# Patient Record
Sex: FEMALE | Race: WHITE | NOT HISPANIC OR LATINO
[De-identification: names, ages, dates, MRNs, and addresses within clinical notes are randomized per-mention and may not be internally consistent; named-entity substitution may affect disease eponyms.]

---

## 2017-01-18 ENCOUNTER — APPOINTMENT (OUTPATIENT)
Age: 72
End: 2017-01-18

## 2017-03-01 ENCOUNTER — APPOINTMENT (OUTPATIENT)
Dept: INTERNAL MEDICINE | Facility: CLINIC | Age: 72
End: 2017-03-01

## 2017-03-01 VITALS
WEIGHT: 134 LBS | HEART RATE: 58 BPM | HEIGHT: 62 IN | DIASTOLIC BLOOD PRESSURE: 80 MMHG | BODY MASS INDEX: 24.66 KG/M2 | SYSTOLIC BLOOD PRESSURE: 140 MMHG | OXYGEN SATURATION: 98 %

## 2017-03-01 VITALS — TEMPERATURE: 98.6 F

## 2017-03-01 DIAGNOSIS — R74.8 ABNORMAL LEVELS OF OTHER SERUM ENZYMES: ICD-10-CM

## 2017-03-01 RX ORDER — PROMETHAZINE HYDROCHLORIDE AND DEXTROMETHORPHAN HYDROBROMIDE ORAL SOLUTION 15; 6.25 MG/5ML; MG/5ML
6.25-15 SOLUTION ORAL AT BEDTIME
Qty: 100 | Refills: 0 | Status: COMPLETED | COMMUNITY
Start: 2016-09-02 | End: 2017-03-21

## 2017-10-20 ENCOUNTER — APPOINTMENT (OUTPATIENT)
Dept: INTERNAL MEDICINE | Facility: CLINIC | Age: 72
End: 2017-10-20
Payer: MEDICARE

## 2017-10-20 VITALS
DIASTOLIC BLOOD PRESSURE: 80 MMHG | HEIGHT: 62 IN | HEART RATE: 60 BPM | WEIGHT: 132 LBS | SYSTOLIC BLOOD PRESSURE: 180 MMHG | BODY MASS INDEX: 24.29 KG/M2 | OXYGEN SATURATION: 92 %

## 2017-10-20 VITALS — DIASTOLIC BLOOD PRESSURE: 75 MMHG | SYSTOLIC BLOOD PRESSURE: 140 MMHG

## 2017-10-20 DIAGNOSIS — J06.9 ACUTE UPPER RESPIRATORY INFECTION, UNSPECIFIED: ICD-10-CM

## 2017-10-20 PROCEDURE — 99215 OFFICE O/P EST HI 40 MIN: CPT | Mod: 25

## 2017-10-20 PROCEDURE — 90670 PCV13 VACCINE IM: CPT

## 2017-10-20 PROCEDURE — G0009: CPT

## 2017-10-23 LAB
25(OH)D3 SERPL-MCNC: 16.9 NG/ML
ALBUMIN SERPL ELPH-MCNC: 4.1 G/DL
ALP BLD-CCNC: 62 U/L
ALT SERPL-CCNC: 12 U/L
ANION GAP SERPL CALC-SCNC: 14 MMOL/L
AST SERPL-CCNC: 15 U/L
BASOPHILS # BLD AUTO: 0.04 K/UL
BASOPHILS NFR BLD AUTO: 0.6 %
BILIRUB SERPL-MCNC: 0.3 MG/DL
BUN SERPL-MCNC: 13 MG/DL
CALCIUM SERPL-MCNC: 9.5 MG/DL
CHLORIDE SERPL-SCNC: 104 MMOL/L
CHOLEST SERPL-MCNC: 149 MG/DL
CHOLEST/HDLC SERPL: 2 RATIO
CO2 SERPL-SCNC: 23 MMOL/L
CREAT SERPL-MCNC: 0.84 MG/DL
EOSINOPHIL # BLD AUTO: 0.08 K/UL
EOSINOPHIL NFR BLD AUTO: 1.2 %
GLUCOSE SERPL-MCNC: 97 MG/DL
HBA1C MFR BLD HPLC: 5.9 %
HCT VFR BLD CALC: 43.4 %
HDLC SERPL-MCNC: 73 MG/DL
HGB BLD-MCNC: 14.5 G/DL
IMM GRANULOCYTES NFR BLD AUTO: 0.3 %
LDLC SERPL CALC-MCNC: 66 MG/DL
LYMPHOCYTES # BLD AUTO: 1.68 K/UL
LYMPHOCYTES NFR BLD AUTO: 26.1 %
MAN DIFF?: NORMAL
MCHC RBC-ENTMCNC: 32.1 PG
MCHC RBC-ENTMCNC: 33.4 GM/DL
MCV RBC AUTO: 96 FL
MONOCYTES # BLD AUTO: 0.41 K/UL
MONOCYTES NFR BLD AUTO: 6.4 %
NEUTROPHILS # BLD AUTO: 4.21 K/UL
NEUTROPHILS NFR BLD AUTO: 65.4 %
PLATELET # BLD AUTO: 313 K/UL
POTASSIUM SERPL-SCNC: 4.4 MMOL/L
PROT SERPL-MCNC: 6.9 G/DL
RBC # BLD: 4.52 M/UL
RBC # FLD: 13.5 %
SODIUM SERPL-SCNC: 141 MMOL/L
TRIGL SERPL-MCNC: 52 MG/DL
TSH SERPL-ACNC: 0.93 UIU/ML
WBC # FLD AUTO: 6.44 K/UL

## 2017-10-26 ENCOUNTER — FORM ENCOUNTER (OUTPATIENT)
Age: 72
End: 2017-10-26

## 2017-10-27 ENCOUNTER — APPOINTMENT (OUTPATIENT)
Dept: MAMMOGRAPHY | Facility: IMAGING CENTER | Age: 72
End: 2017-10-27
Payer: MEDICARE

## 2017-10-27 ENCOUNTER — OUTPATIENT (OUTPATIENT)
Dept: OUTPATIENT SERVICES | Facility: HOSPITAL | Age: 72
LOS: 1 days | End: 2017-10-27
Payer: MEDICARE

## 2017-10-27 ENCOUNTER — APPOINTMENT (OUTPATIENT)
Dept: ULTRASOUND IMAGING | Facility: IMAGING CENTER | Age: 72
End: 2017-10-27
Payer: MEDICARE

## 2017-10-27 DIAGNOSIS — Z00.8 ENCOUNTER FOR OTHER GENERAL EXAMINATION: ICD-10-CM

## 2017-10-27 PROCEDURE — G0204: CPT | Mod: 26

## 2017-10-27 PROCEDURE — 77066 DX MAMMO INCL CAD BI: CPT

## 2017-10-27 PROCEDURE — 76642 ULTRASOUND BREAST LIMITED: CPT | Mod: 26,RT

## 2017-10-27 PROCEDURE — G0279: CPT | Mod: 26

## 2017-10-27 PROCEDURE — G0279: CPT

## 2017-10-27 PROCEDURE — 76642 ULTRASOUND BREAST LIMITED: CPT

## 2017-11-10 ENCOUNTER — EMERGENCY (EMERGENCY)
Facility: HOSPITAL | Age: 72
LOS: 1 days | Discharge: HOME | End: 2017-11-10
Attending: EMERGENCY MEDICINE | Admitting: EMERGENCY MEDICINE
Payer: MEDICARE

## 2017-11-10 VITALS
TEMPERATURE: 98 F | RESPIRATION RATE: 16 BRPM | OXYGEN SATURATION: 96 % | SYSTOLIC BLOOD PRESSURE: 145 MMHG | HEART RATE: 56 BPM | DIASTOLIC BLOOD PRESSURE: 69 MMHG

## 2017-11-10 VITALS
HEART RATE: 58 BPM | SYSTOLIC BLOOD PRESSURE: 142 MMHG | RESPIRATION RATE: 16 BRPM | OXYGEN SATURATION: 97 % | DIASTOLIC BLOOD PRESSURE: 78 MMHG

## 2017-11-10 LAB
ALBUMIN SERPL ELPH-MCNC: 4.5 G/DL — SIGNIFICANT CHANGE UP (ref 3.3–5)
ALP SERPL-CCNC: 65 U/L — SIGNIFICANT CHANGE UP (ref 40–120)
ALT FLD-CCNC: 18 U/L RC — SIGNIFICANT CHANGE UP (ref 10–45)
ANION GAP SERPL CALC-SCNC: 13 MMOL/L — SIGNIFICANT CHANGE UP (ref 5–17)
AST SERPL-CCNC: 20 U/L — SIGNIFICANT CHANGE UP (ref 10–40)
BASOPHILS # BLD AUTO: 0.1 K/UL — SIGNIFICANT CHANGE UP (ref 0–0.2)
BASOPHILS NFR BLD AUTO: 0.7 % — SIGNIFICANT CHANGE UP (ref 0–2)
BILIRUB SERPL-MCNC: 0.3 MG/DL — SIGNIFICANT CHANGE UP (ref 0.2–1.2)
BUN SERPL-MCNC: 12 MG/DL — SIGNIFICANT CHANGE UP (ref 7–23)
CALCIUM SERPL-MCNC: 9.3 MG/DL — SIGNIFICANT CHANGE UP (ref 8.4–10.5)
CHLORIDE SERPL-SCNC: 102 MMOL/L — SIGNIFICANT CHANGE UP (ref 96–108)
CO2 SERPL-SCNC: 22 MMOL/L — SIGNIFICANT CHANGE UP (ref 22–31)
CREAT SERPL-MCNC: 0.63 MG/DL — SIGNIFICANT CHANGE UP (ref 0.5–1.3)
EOSINOPHIL # BLD AUTO: 0.1 K/UL — SIGNIFICANT CHANGE UP (ref 0–0.5)
EOSINOPHIL NFR BLD AUTO: 0.9 % — SIGNIFICANT CHANGE UP (ref 0–6)
GLUCOSE SERPL-MCNC: 105 MG/DL — HIGH (ref 70–99)
HCT VFR BLD CALC: 44.6 % — SIGNIFICANT CHANGE UP (ref 34.5–45)
HGB BLD-MCNC: 14.6 G/DL — SIGNIFICANT CHANGE UP (ref 11.5–15.5)
LYMPHOCYTES # BLD AUTO: 1.4 K/UL — SIGNIFICANT CHANGE UP (ref 1–3.3)
LYMPHOCYTES # BLD AUTO: 17.2 % — SIGNIFICANT CHANGE UP (ref 13–44)
MCHC RBC-ENTMCNC: 32 PG — SIGNIFICANT CHANGE UP (ref 27–34)
MCHC RBC-ENTMCNC: 32.7 GM/DL — SIGNIFICANT CHANGE UP (ref 32–36)
MCV RBC AUTO: 98 FL — SIGNIFICANT CHANGE UP (ref 80–100)
MONOCYTES # BLD AUTO: 0.5 K/UL — SIGNIFICANT CHANGE UP (ref 0–0.9)
MONOCYTES NFR BLD AUTO: 6.4 % — SIGNIFICANT CHANGE UP (ref 2–14)
NEUTROPHILS # BLD AUTO: 5.9 K/UL — SIGNIFICANT CHANGE UP (ref 1.8–7.4)
NEUTROPHILS NFR BLD AUTO: 74.8 % — SIGNIFICANT CHANGE UP (ref 43–77)
PLATELET # BLD AUTO: 288 K/UL — SIGNIFICANT CHANGE UP (ref 150–400)
POTASSIUM SERPL-MCNC: 3.7 MMOL/L — SIGNIFICANT CHANGE UP (ref 3.5–5.3)
POTASSIUM SERPL-SCNC: 3.7 MMOL/L — SIGNIFICANT CHANGE UP (ref 3.5–5.3)
PROT SERPL-MCNC: 6.8 G/DL — SIGNIFICANT CHANGE UP (ref 6–8.3)
RBC # BLD: 4.55 M/UL — SIGNIFICANT CHANGE UP (ref 3.8–5.2)
RBC # FLD: 11.3 % — SIGNIFICANT CHANGE UP (ref 10.3–14.5)
SODIUM SERPL-SCNC: 137 MMOL/L — SIGNIFICANT CHANGE UP (ref 135–145)
WBC # BLD: 7.9 K/UL — SIGNIFICANT CHANGE UP (ref 3.8–10.5)
WBC # FLD AUTO: 7.9 K/UL — SIGNIFICANT CHANGE UP (ref 3.8–10.5)

## 2017-11-10 PROCEDURE — 99284 EMERGENCY DEPT VISIT MOD MDM: CPT | Mod: 25

## 2017-11-10 PROCEDURE — 80053 COMPREHEN METABOLIC PANEL: CPT

## 2017-11-10 PROCEDURE — 85027 COMPLETE CBC AUTOMATED: CPT

## 2017-11-10 PROCEDURE — 96374 THER/PROPH/DIAG INJ IV PUSH: CPT

## 2017-11-10 PROCEDURE — 99284 EMERGENCY DEPT VISIT MOD MDM: CPT

## 2017-11-10 PROCEDURE — 82962 GLUCOSE BLOOD TEST: CPT

## 2017-11-10 RX ORDER — MECLIZINE HCL 12.5 MG
1 TABLET ORAL
Qty: 15 | Refills: 0
Start: 2017-11-10 | End: 2017-11-15

## 2017-11-10 RX ORDER — SODIUM CHLORIDE 9 MG/ML
1000 INJECTION INTRAMUSCULAR; INTRAVENOUS; SUBCUTANEOUS ONCE
Qty: 0 | Refills: 0 | Status: COMPLETED | OUTPATIENT
Start: 2017-11-10 | End: 2017-11-10

## 2017-11-10 RX ORDER — MECLIZINE HCL 12.5 MG
25 TABLET ORAL ONCE
Qty: 0 | Refills: 0 | Status: COMPLETED | OUTPATIENT
Start: 2017-11-10 | End: 2017-11-10

## 2017-11-10 RX ORDER — ONDANSETRON 8 MG/1
4 TABLET, FILM COATED ORAL ONCE
Qty: 0 | Refills: 0 | Status: COMPLETED | OUTPATIENT
Start: 2017-11-10 | End: 2017-11-10

## 2017-11-10 RX ADMIN — ONDANSETRON 4 MILLIGRAM(S): 8 TABLET, FILM COATED ORAL at 15:34

## 2017-11-10 RX ADMIN — SODIUM CHLORIDE 2000 MILLILITER(S): 9 INJECTION INTRAMUSCULAR; INTRAVENOUS; SUBCUTANEOUS at 15:34

## 2017-11-10 RX ADMIN — Medication 25 MILLIGRAM(S): at 14:19

## 2017-11-10 NOTE — ED PROVIDER NOTE - PROGRESS NOTE DETAILS
symptoms completely resolved. will d/c on vertigo -[facility] DO Dr. Mccall Note: s/o from Dr. Fernandez, stable for [place] home with resolved sxs.

## 2017-11-10 NOTE — ED ADULT NURSE NOTE - OBJECTIVE STATEMENT
72y f pt arrived via ems; emt reports pt suddenly started feeling dizzy and nauseous; pt vomited; pt aox3; no resp distress; + perrl; no vision changes; no chest pain; no abd pain; +N/V; no diarrhea; pt states symptoms relieved when sitting and not moving; exacerbated by moving and bending; pt states has no medical hx; and is taking no medications except vitamin d; pt ambulates on own without assist; skin warm dry intact; no visible neuro deficits

## 2017-11-10 NOTE — ED PROVIDER NOTE - NS ED ROS FT
ROS: no CP/SOB. no cough. no fever. +n/v no d/c. no abd pain. no rash. no bleeding. no urinary complaints. no weakness. no vision changes. no HA. no neck/back pain. no extremity swelling/deformity. No change in mental status.

## 2017-11-10 NOTE — ED PROVIDER NOTE - OBJECTIVE STATEMENT
71yo F with dizziness/vertigo onset this afternoon, +nausesa with episode of vomiting. +rt ear fullness, no tinnitius or loss of hearing. no head trauma. no focal weakness. feels unsteady on feet. no PMH.

## 2017-11-10 NOTE — ED PROVIDER NOTE - ATTENDING CONTRIBUTION TO CARE
Patient presenting c/o dizziness associated with nausea and vomiting.  Patient reporting that she noted something feeling off with her R ear, then started feeling lightheaded with nausea which worsened over a few minutes.  Denying associated headaches, numbness or tingling.  Dizziness resolves with lying backwards, returns with movement.    On exam patient well appearing, vital signs within normal limits, RRR S1/S2, lungs clear to ascultation bilaterally, abdomen soft, non tender, non distended.  R beating nystagmus on exam, no noted truncal ataxia.    Patient history and exam most consistent with peripheral process given non thunderclap origin of symptoms, normal general neurologic exam, lack of truncal ataxia, peripheral nystagmus pattern and resolution of symptoms with positioning, plan for treatment for peripheral vertigo and re-evaluation.

## 2017-11-10 NOTE — ED PROVIDER NOTE - MEDICAL DECISION MAKING DETAILS
peripheral vertigo, HINTS exam peripheral, no concern for central lesion, will trial meclizine and reasses.

## 2017-11-10 NOTE — ED PROVIDER NOTE - CARE PLAN
Principal Discharge DX:	Peripheral vertigo involving right ear Principal Discharge DX:	Peripheral vertigo involving right ear  Instructions for follow-up, activity and diet:	1. Return to ED for worsening, progressive or any other concerning symptoms   2. Follow up with your primary care doctor in 2-3days  3. Take 25mg of meclizine three times a day for 3-5 days.   4. Follow up with ENT clinic [phone]

## 2017-11-10 NOTE — ED PROVIDER NOTE - PLAN OF CARE
1. Return to ED for worsening, progressive or any other concerning symptoms   2. Follow up with your primary care doctor in 2-3days  3. Take 25mg of meclizine three times a day for 3-5 days.   4. Follow up with ENT clinic [phone]

## 2017-11-10 NOTE — ED PROVIDER NOTE - PHYSICAL EXAMINATION
Gen: NAD, AOx3  Head: NCAT  HEENT: PERRL, oral mucosa moist, normal conjunctiva, neck supple  Lung: CTAB, no respiratory distress  CV: rrr, no murmur, Normal perfusion  Abd: soft, NTND, no CVA tenderness  MSK: No edema, no visible deformities  Neuro: No focal neurologic deficits, CN II-XII intact, +rt nystagmus, HINTS with +saccade, no dysmetria, 5/5 global strength, sensation intact, no drift  Skin: No rash   Psych: normal affect

## 2017-11-13 ENCOUNTER — APPOINTMENT (OUTPATIENT)
Dept: INTERNAL MEDICINE | Facility: CLINIC | Age: 72
End: 2017-11-13
Payer: MEDICARE

## 2017-11-13 VITALS
HEIGHT: 62 IN | WEIGHT: 135 LBS | SYSTOLIC BLOOD PRESSURE: 130 MMHG | DIASTOLIC BLOOD PRESSURE: 80 MMHG | BODY MASS INDEX: 24.84 KG/M2 | HEART RATE: 70 BPM | OXYGEN SATURATION: 98 %

## 2017-11-13 PROCEDURE — 99213 OFFICE O/P EST LOW 20 MIN: CPT

## 2017-11-15 ENCOUNTER — APPOINTMENT (OUTPATIENT)
Dept: OTOLARYNGOLOGY | Facility: CLINIC | Age: 72
End: 2017-11-15
Payer: MEDICARE

## 2017-11-15 VITALS
BODY MASS INDEX: 24.29 KG/M2 | DIASTOLIC BLOOD PRESSURE: 78 MMHG | SYSTOLIC BLOOD PRESSURE: 151 MMHG | HEIGHT: 62 IN | HEART RATE: 69 BPM | WEIGHT: 132 LBS

## 2017-11-15 DIAGNOSIS — Z87.898 PERSONAL HISTORY OF OTHER SPECIFIED CONDITIONS: ICD-10-CM

## 2017-11-15 PROCEDURE — 92567 TYMPANOMETRY: CPT

## 2017-11-15 PROCEDURE — 92557 COMPREHENSIVE HEARING TEST: CPT

## 2017-11-15 PROCEDURE — 69210 REMOVE IMPACTED EAR WAX UNI: CPT

## 2017-11-15 PROCEDURE — 99204 OFFICE O/P NEW MOD 45 MIN: CPT | Mod: 25

## 2017-11-17 ENCOUNTER — MEDICATION RENEWAL (OUTPATIENT)
Age: 72
End: 2017-11-17

## 2018-02-12 ENCOUNTER — APPOINTMENT (OUTPATIENT)
Dept: GASTROENTEROLOGY | Facility: CLINIC | Age: 73
End: 2018-02-12
Payer: MEDICARE

## 2018-02-12 VITALS
SYSTOLIC BLOOD PRESSURE: 132 MMHG | RESPIRATION RATE: 16 BRPM | BODY MASS INDEX: 24.29 KG/M2 | TEMPERATURE: 97.9 F | WEIGHT: 132 LBS | DIASTOLIC BLOOD PRESSURE: 80 MMHG | HEIGHT: 62 IN | HEART RATE: 65 BPM

## 2018-02-12 DIAGNOSIS — Z80.0 FAMILY HISTORY OF MALIGNANT NEOPLASM OF DIGESTIVE ORGANS: ICD-10-CM

## 2018-02-12 PROCEDURE — 99204 OFFICE O/P NEW MOD 45 MIN: CPT

## 2018-02-21 ENCOUNTER — APPOINTMENT (OUTPATIENT)
Dept: GASTROENTEROLOGY | Facility: CLINIC | Age: 73
End: 2018-02-21

## 2018-03-05 ENCOUNTER — RX RENEWAL (OUTPATIENT)
Age: 73
End: 2018-03-05

## 2018-03-30 ENCOUNTER — APPOINTMENT (OUTPATIENT)
Dept: GASTROENTEROLOGY | Facility: AMBULATORY MEDICAL SERVICES | Age: 73
End: 2018-03-30
Payer: MEDICARE

## 2018-03-30 PROCEDURE — 45385 COLONOSCOPY W/LESION REMOVAL: CPT | Mod: PT

## 2018-03-30 PROCEDURE — 45380 COLONOSCOPY AND BIOPSY: CPT | Mod: 59,PT

## 2018-04-03 ENCOUNTER — RESULT REVIEW (OUTPATIENT)
Age: 73
End: 2018-04-03

## 2018-05-06 ENCOUNTER — FORM ENCOUNTER (OUTPATIENT)
Age: 73
End: 2018-05-06

## 2018-05-07 ENCOUNTER — OUTPATIENT (OUTPATIENT)
Dept: OUTPATIENT SERVICES | Facility: HOSPITAL | Age: 73
LOS: 1 days | End: 2018-05-07
Payer: MEDICARE

## 2018-05-07 ENCOUNTER — APPOINTMENT (OUTPATIENT)
Dept: MAMMOGRAPHY | Facility: IMAGING CENTER | Age: 73
End: 2018-05-07
Payer: MEDICARE

## 2018-05-07 DIAGNOSIS — R92.1 MAMMOGRAPHIC CALCIFICATION FOUND ON DIAGNOSTIC IMAGING OF BREAST: ICD-10-CM

## 2018-05-07 PROCEDURE — 77065 DX MAMMO INCL CAD UNI: CPT | Mod: 26,LT

## 2018-05-07 PROCEDURE — 77065 DX MAMMO INCL CAD UNI: CPT

## 2018-05-21 ENCOUNTER — APPOINTMENT (OUTPATIENT)
Dept: INTERNAL MEDICINE | Facility: CLINIC | Age: 73
End: 2018-05-21
Payer: MEDICARE

## 2018-05-21 VITALS
DIASTOLIC BLOOD PRESSURE: 76 MMHG | WEIGHT: 133 LBS | OXYGEN SATURATION: 95 % | HEIGHT: 62 IN | SYSTOLIC BLOOD PRESSURE: 124 MMHG | HEART RATE: 67 BPM | BODY MASS INDEX: 24.48 KG/M2

## 2018-05-21 DIAGNOSIS — H83.01 LABYRINTHITIS, RIGHT EAR: ICD-10-CM

## 2018-05-21 DIAGNOSIS — Z87.898 PERSONAL HISTORY OF OTHER SPECIFIED CONDITIONS: ICD-10-CM

## 2018-05-21 DIAGNOSIS — H90.3 SENSORINEURAL HEARING LOSS, BILATERAL: ICD-10-CM

## 2018-05-21 DIAGNOSIS — R19.4 CHANGE IN BOWEL HABIT: ICD-10-CM

## 2018-05-21 DIAGNOSIS — R79.89 OTHER SPECIFIED ABNORMAL FINDINGS OF BLOOD CHEMISTRY: ICD-10-CM

## 2018-05-21 PROCEDURE — 99213 OFFICE O/P EST LOW 20 MIN: CPT

## 2018-05-21 RX ORDER — MECLIZINE HYDROCHLORIDE 25 MG/1
25 TABLET ORAL
Qty: 15 | Refills: 0 | Status: COMPLETED | COMMUNITY
Start: 2017-11-10 | End: 2018-05-21

## 2018-05-21 RX ORDER — FLUTICASONE PROPIONATE 50 UG/1
50 SPRAY, METERED NASAL
Qty: 16 | Refills: 0 | Status: COMPLETED | COMMUNITY
Start: 2017-02-23 | End: 2018-05-21

## 2018-05-21 RX ORDER — IPRATROPIUM BROMIDE 42 UG/1
0.06 SPRAY NASAL
Qty: 1 | Refills: 0 | Status: COMPLETED | COMMUNITY
Start: 2018-05-21 | End: 2018-05-28

## 2018-05-21 RX ORDER — AZELASTINE HYDROCHLORIDE 137 UG/1
0.1 SPRAY, METERED NASAL
Qty: 30 | Refills: 0 | Status: COMPLETED | COMMUNITY
Start: 2017-02-23 | End: 2018-05-21

## 2018-05-21 NOTE — PHYSICAL EXAM
[No Acute Distress] : no acute distress [Well Nourished] : well nourished [Well Developed] : well developed [Normal Sclera/Conjunctiva] : normal sclera/conjunctiva [PERRL] : pupils equal round and reactive to light [EOMI] : extraocular movements intact [Normal Outer Ear/Nose] : the outer ears and nose were normal in appearance [Normal Oropharynx] : the oropharynx was normal [Supple] : supple [No Lymphadenopathy] : no lymphadenopathy [No Respiratory Distress] : no respiratory distress  [Clear to Auscultation] : lungs were clear to auscultation bilaterally [No Accessory Muscle Use] : no accessory muscle use [Normal Rate] : normal rate  [Regular Rhythm] : with a regular rhythm [Normal S1, S2] : normal S1 and S2 [No Murmur] : no murmur heard [Normal Posterior Cervical Nodes] : no posterior cervical lymphadenopathy [Normal Anterior Cervical Nodes] : no anterior cervical lymphadenopathy [No Joint Swelling] : no joint swelling [Grossly Normal Strength/Tone] : grossly normal strength/tone [Speech Grossly Normal] : speech grossly normal [Normal Mood] : the mood was normal

## 2018-05-21 NOTE — HISTORY OF PRESENT ILLNESS
[FreeTextEntry8] : Presents with sinus congestion, dripping down to back of throat for 2 weeks. No fevers or chills. She is coughing with production as well. No swollen glands in neck that she noticed. Not taken anything yet OTC. No travel.

## 2018-05-21 NOTE — ASSESSMENT
[FreeTextEntry1] : \par Suspect allergic sinusitis given history and exam findings. Rec Ipratropium nasal spray and Fexofenadine 180mg daily for 7 days. Also may take Hycodan cough syrup as needed. I advised her about side effects (drowsiness, etc).

## 2018-08-02 ENCOUNTER — MEDICATION RENEWAL (OUTPATIENT)
Age: 73
End: 2018-08-02

## 2018-10-22 ENCOUNTER — APPOINTMENT (OUTPATIENT)
Dept: INTERNAL MEDICINE | Facility: CLINIC | Age: 73
End: 2018-10-22
Payer: MEDICARE

## 2018-10-22 VITALS
WEIGHT: 132 LBS | DIASTOLIC BLOOD PRESSURE: 84 MMHG | OXYGEN SATURATION: 98 % | TEMPERATURE: 98 F | SYSTOLIC BLOOD PRESSURE: 130 MMHG | HEART RATE: 72 BPM | BODY MASS INDEX: 24.29 KG/M2 | HEIGHT: 62 IN

## 2018-10-22 PROCEDURE — G0009: CPT

## 2018-10-22 PROCEDURE — 90662 IIV NO PRSV INCREASED AG IM: CPT

## 2018-10-22 PROCEDURE — G0439: CPT

## 2018-10-22 PROCEDURE — G0008: CPT

## 2018-10-22 PROCEDURE — 90732 PPSV23 VACC 2 YRS+ SUBQ/IM: CPT

## 2018-10-22 RX ORDER — HYDROCODONE BITARTRATE AND HOMATROPINE METHYLBROMIDE 5; 1.5 MG/5ML; MG/5ML
5-1.5 SYRUP ORAL
Qty: 240 | Refills: 0 | Status: DISCONTINUED | COMMUNITY
Start: 2018-08-02 | End: 2018-10-22

## 2018-10-22 NOTE — PHYSICAL EXAM
[General Appearance - Alert] : alert [General Appearance - In No Acute Distress] : in no acute distress [Sclera] : the sclera and conjunctiva were normal [PERRL With Normal Accommodation] : pupils were equal in size, round, and reactive to light [Extraocular Movements] : extraocular movements were intact [Outer Ear] : the ears and nose were normal in appearance [Oropharynx] : the oropharynx was normal [Neck Appearance] : the appearance of the neck was normal [Neck Cervical Mass (___cm)] : no neck mass was observed [Jugular Venous Distention Increased] : there was no jugular-venous distention [Thyroid Diffuse Enlargement] : the thyroid was not enlarged [Thyroid Nodule] : there were no palpable thyroid nodules [Auscultation Breath Sounds / Voice Sounds] : lungs were clear to auscultation bilaterally [Heart Rate And Rhythm] : heart rate was normal and rhythm regular [Heart Sounds] : normal S1 and S2 [Heart Sounds Gallop] : no gallops [Murmurs] : no murmurs [Heart Sounds Pericardial Friction Rub] : no pericardial rub [Full Pulse] : the pedal pulses are present [Edema] : there was no peripheral edema [Breast Appearance] : normal in appearance [Breast Palpation Mass] : no palpable masses [Breast Abnormal Lactation (Galactorrhea)] : no nipple discharge [Bowel Sounds] : normal bowel sounds [Abdomen Soft] : soft [Abdomen Tenderness] : non-tender [Abdomen Mass (___ Cm)] : no abdominal mass palpated [Cervical Lymph Nodes Enlarged Posterior Bilaterally] : posterior cervical [Cervical Lymph Nodes Enlarged Anterior Bilaterally] : anterior cervical [Supraclavicular Lymph Nodes Enlarged Bilaterally] : supraclavicular [Axillary Lymph Nodes Enlarged Bilaterally] : axillary [Femoral Lymph Nodes Enlarged Bilaterally] : femoral [Inguinal Lymph Nodes Enlarged Bilaterally] : inguinal [No CVA Tenderness] : no ~M costovertebral angle tenderness [No Spinal Tenderness] : no spinal tenderness [Abnormal Walk] : normal gait [Nail Clubbing] : no clubbing  or cyanosis of the fingernails [Musculoskeletal - Swelling] : no joint swelling seen [Motor Tone] : muscle strength and tone were normal [] : no rash [Sensation] : the sensory exam was normal to light touch and pinprick [Motor Exam] : the motor exam was normal [No Focal Deficits] : no focal deficits [Oriented To Time, Place, And Person] : oriented to person, place, and time [Impaired Insight] : insight and judgment were intact [Affect] : the affect was normal

## 2018-10-22 NOTE — HISTORY OF PRESENT ILLNESS
[Health Maintenance] : health maintenance [___ Year(s) Ago] : [unfilled] year(s) ago [Spouse] : spouse [] :  [Working Full Time] : working full time [Current Cigarette Smoker] : is a current cigarette smoker [Occasional Use] : occasional alcohol use [None] : The patient has no concerns about alcohol abuse [Never] : has never used illicit drugs [Good] : good [Reg. Dental Visits] : She has regular dental visits [Healthy Diet] : She consumes a diverse and healthy diet [Regular Exercise] : She exercises regularly [Tobacco Use] : She uses tobacco [Alcohol Use] : She consumes alcohol [Postmenopausal] : the patient is postmenopausal [Reviewed and Updated] : risk screening reviewed and updated [Vision Problems] : She denies vision problems [Hearing Loss] : She denies hearing loss [Drug Abuse] : She denies drug use

## 2018-10-22 NOTE — REASON FOR VISIT
[CPE] : a comprehensive physical exam [Pre-Visit Preparation] : pre-visit preparation was done [Intercurrent Specialty/Sub-specialty Visits] : the patient has intercurrent specialty/sub-specialty visits [FreeTextEntry3] : GI

## 2018-10-22 NOTE — ASSESSMENT
[FreeTextEntry1] : \par 73 year old woman current smoker with hx elevated LFTs presents for CPE.\par \par 1. Elevated LFTs: appreciate GI input; repeat LFTs with labs today\par \par 2. HCM: mammo referral given; check labs with physical today; counselled pt on smoking cessation, she is not interested in quitting or cutting back at this time; GI referral given for screening colonoscopy; flu shot and Pneumovax given today

## 2018-10-25 ENCOUNTER — FORM ENCOUNTER (OUTPATIENT)
Age: 73
End: 2018-10-25

## 2018-10-26 ENCOUNTER — OUTPATIENT (OUTPATIENT)
Dept: OUTPATIENT SERVICES | Facility: HOSPITAL | Age: 73
LOS: 1 days | End: 2018-10-26
Payer: MEDICARE

## 2018-10-26 ENCOUNTER — APPOINTMENT (OUTPATIENT)
Dept: MAMMOGRAPHY | Facility: IMAGING CENTER | Age: 73
End: 2018-10-26
Payer: MEDICARE

## 2018-10-26 ENCOUNTER — APPOINTMENT (OUTPATIENT)
Dept: ULTRASOUND IMAGING | Facility: IMAGING CENTER | Age: 73
End: 2018-10-26
Payer: MEDICARE

## 2018-10-26 DIAGNOSIS — R92.1 MAMMOGRAPHIC CALCIFICATION FOUND ON DIAGNOSTIC IMAGING OF BREAST: ICD-10-CM

## 2018-10-26 LAB
ALBUMIN SERPL ELPH-MCNC: 4.3 G/DL
ALP BLD-CCNC: 59 U/L
ALT SERPL-CCNC: 13 U/L
ANION GAP SERPL CALC-SCNC: 13 MMOL/L
APTT BLD: 32.5 SEC
AST SERPL-CCNC: 20 U/L
BASOPHILS # BLD AUTO: 0.06 K/UL
BASOPHILS NFR BLD AUTO: 0.8 %
BILIRUB SERPL-MCNC: 0.3 MG/DL
BUN SERPL-MCNC: 13 MG/DL
CALCIUM SERPL-MCNC: 9.6 MG/DL
CHLORIDE SERPL-SCNC: 101 MMOL/L
CHOLEST SERPL-MCNC: 164 MG/DL
CHOLEST/HDLC SERPL: 2.1 RATIO
CO2 SERPL-SCNC: 24 MMOL/L
CREAT SERPL-MCNC: 0.69 MG/DL
EOSINOPHIL # BLD AUTO: 0.06 K/UL
EOSINOPHIL NFR BLD AUTO: 0.8 %
GLUCOSE SERPL-MCNC: 102 MG/DL
HBA1C MFR BLD HPLC: 6 %
HCT VFR BLD CALC: 44.5 %
HDLC SERPL-MCNC: 79 MG/DL
HGB BLD-MCNC: 14.9 G/DL
IMM GRANULOCYTES NFR BLD AUTO: 0.3 %
INR PPP: 0.99 RATIO
LDLC SERPL CALC-MCNC: 72 MG/DL
LYMPHOCYTES # BLD AUTO: 1.77 K/UL
LYMPHOCYTES NFR BLD AUTO: 24 %
MAN DIFF?: NORMAL
MCHC RBC-ENTMCNC: 32.3 PG
MCHC RBC-ENTMCNC: 33.5 GM/DL
MCV RBC AUTO: 96.5 FL
MONOCYTES # BLD AUTO: 0.6 K/UL
MONOCYTES NFR BLD AUTO: 8.2 %
NEUTROPHILS # BLD AUTO: 4.85 K/UL
NEUTROPHILS NFR BLD AUTO: 65.9 %
PLATELET # BLD AUTO: 345 K/UL
POTASSIUM SERPL-SCNC: 4.4 MMOL/L
PROT SERPL-MCNC: 7.1 G/DL
PT BLD: 11.2 SEC
RBC # BLD: 4.61 M/UL
RBC # FLD: 13.8 %
SODIUM SERPL-SCNC: 138 MMOL/L
TRIGL SERPL-MCNC: 65 MG/DL
TSH SERPL-ACNC: 0.86 UIU/ML
WBC # FLD AUTO: 7.36 K/UL

## 2018-10-26 PROCEDURE — G0279: CPT

## 2018-10-26 PROCEDURE — 76641 ULTRASOUND BREAST COMPLETE: CPT | Mod: 26,50

## 2018-10-26 PROCEDURE — 77066 DX MAMMO INCL CAD BI: CPT

## 2018-10-26 PROCEDURE — 76641 ULTRASOUND BREAST COMPLETE: CPT

## 2018-10-26 PROCEDURE — G0279: CPT | Mod: 26

## 2018-10-26 PROCEDURE — 77066 DX MAMMO INCL CAD BI: CPT | Mod: 26

## 2018-11-26 ENCOUNTER — TRANSCRIPTION ENCOUNTER (OUTPATIENT)
Age: 73
End: 2018-11-26

## 2018-11-30 ENCOUNTER — TRANSCRIPTION ENCOUNTER (OUTPATIENT)
Age: 73
End: 2018-11-30

## 2018-11-30 ENCOUNTER — MEDICATION RENEWAL (OUTPATIENT)
Age: 73
End: 2018-11-30

## 2019-01-28 ENCOUNTER — TRANSCRIPTION ENCOUNTER (OUTPATIENT)
Age: 74
End: 2019-01-28

## 2019-05-28 ENCOUNTER — TRANSCRIPTION ENCOUNTER (OUTPATIENT)
Age: 74
End: 2019-05-28

## 2019-06-24 ENCOUNTER — RX RENEWAL (OUTPATIENT)
Age: 74
End: 2019-06-24

## 2019-07-16 ENCOUNTER — APPOINTMENT (OUTPATIENT)
Dept: GASTROENTEROLOGY | Facility: AMBULATORY MEDICAL SERVICES | Age: 74
End: 2019-07-16
Payer: MEDICARE

## 2019-07-16 PROCEDURE — 45385 COLONOSCOPY W/LESION REMOVAL: CPT

## 2019-07-23 ENCOUNTER — MESSAGE (OUTPATIENT)
Age: 74
End: 2019-07-23

## 2019-11-18 ENCOUNTER — APPOINTMENT (OUTPATIENT)
Dept: INTERNAL MEDICINE | Facility: CLINIC | Age: 74
End: 2019-11-18
Payer: MEDICARE

## 2019-11-18 VITALS
SYSTOLIC BLOOD PRESSURE: 178 MMHG | DIASTOLIC BLOOD PRESSURE: 99 MMHG | TEMPERATURE: 98 F | BODY MASS INDEX: 23.19 KG/M2 | HEIGHT: 62 IN | WEIGHT: 126 LBS

## 2019-11-18 VITALS — OXYGEN SATURATION: 97 % | HEART RATE: 61 BPM

## 2019-11-18 DIAGNOSIS — F17.200 NICOTINE DEPENDENCE, UNSPECIFIED, UNCOMPLICATED: ICD-10-CM

## 2019-11-18 DIAGNOSIS — J30.9 ALLERGIC RHINITIS, UNSPECIFIED: ICD-10-CM

## 2019-11-18 PROCEDURE — G0444 DEPRESSION SCREEN ANNUAL: CPT | Mod: 59

## 2019-11-18 PROCEDURE — G0442 ANNUAL ALCOHOL SCREEN 15 MIN: CPT | Mod: 59

## 2019-11-18 PROCEDURE — 99407 BEHAV CHNG SMOKING > 10 MIN: CPT | Mod: 25

## 2019-11-18 PROCEDURE — G0439: CPT

## 2019-11-19 PROBLEM — J30.9 ALLERGIC SINUSITIS: Status: RESOLVED | Noted: 2018-05-21 | Resolved: 2019-11-19

## 2019-11-19 RX ORDER — HYDROCODONE BITARTRATE AND HOMATROPINE METHYLBROMIDE 5; 1.5 MG/5ML; MG/5ML
5-1.5 SYRUP ORAL
Qty: 240 | Refills: 0 | Status: DISCONTINUED | COMMUNITY
Start: 2018-05-21 | End: 2019-11-19

## 2019-11-19 RX ORDER — FEXOFENADINE HYDROCHLORIDE 180 MG/1
180 TABLET ORAL DAILY
Qty: 7 | Refills: 0 | Status: DISCONTINUED | COMMUNITY
Start: 2018-05-21 | End: 2019-11-19

## 2019-11-19 RX ORDER — IPRATROPIUM BROMIDE 42 UG/1
0.06 SPRAY NASAL
Qty: 1 | Refills: 3 | Status: DISCONTINUED | COMMUNITY
Start: 2018-08-02 | End: 2019-11-19

## 2019-11-19 NOTE — HISTORY OF PRESENT ILLNESS
[Health Maintenance] : health maintenance [___ Year(s) Ago] : [unfilled] year(s) ago [Spouse] : spouse [] :  [Working Full Time] : working full time [Occasional Use] : occasional alcohol use [None] : The patient has no concerns about alcohol abuse [Current Cigarette Smoker] : is a current cigarette smoker [Never] : has never used illicit drugs [Good] : good [Reg. Dental Visits] : She has regular dental visits [Vision Problems] : She denies vision problems [Hearing Loss] : She denies hearing loss [Healthy Diet] : She consumes a diverse and healthy diet [Tobacco Use] : She uses tobacco [Regular Exercise] : She exercises regularly [Alcohol Use] : She consumes alcohol [Drug Abuse] : She denies drug use [Postmenopausal] : the patient is postmenopausal [Reviewed and Updated] : risk screening reviewed and updated

## 2019-11-19 NOTE — PHYSICAL EXAM
[Sclera] : the sclera and conjunctiva were normal [General Appearance - In No Acute Distress] : in no acute distress [General Appearance - Alert] : alert [PERRL With Normal Accommodation] : pupils were equal in size, round, and reactive to light [Extraocular Movements] : extraocular movements were intact [Oropharynx] : the oropharynx was normal [Outer Ear] : the ears and nose were normal in appearance [Neck Cervical Mass (___cm)] : no neck mass was observed [Neck Appearance] : the appearance of the neck was normal [Jugular Venous Distention Increased] : there was no jugular-venous distention [Thyroid Nodule] : there were no palpable thyroid nodules [Thyroid Diffuse Enlargement] : the thyroid was not enlarged [Heart Rate And Rhythm] : heart rate was normal and rhythm regular [Heart Sounds] : normal S1 and S2 [Auscultation Breath Sounds / Voice Sounds] : lungs were clear to auscultation bilaterally [Heart Sounds Gallop] : no gallops [Murmurs] : no murmurs [Full Pulse] : the pedal pulses are present [Heart Sounds Pericardial Friction Rub] : no pericardial rub [Edema] : there was no peripheral edema [Breast Appearance] : normal in appearance [Breast Palpation Mass] : no palpable masses [Breast Abnormal Lactation (Galactorrhea)] : no nipple discharge [Bowel Sounds] : normal bowel sounds [Abdomen Soft] : soft [Abdomen Mass (___ Cm)] : no abdominal mass palpated [Abdomen Tenderness] : non-tender [Cervical Lymph Nodes Enlarged Posterior Bilaterally] : posterior cervical [Cervical Lymph Nodes Enlarged Anterior Bilaterally] : anterior cervical [Supraclavicular Lymph Nodes Enlarged Bilaterally] : supraclavicular [Axillary Lymph Nodes Enlarged Bilaterally] : axillary [Inguinal Lymph Nodes Enlarged Bilaterally] : inguinal [Femoral Lymph Nodes Enlarged Bilaterally] : femoral [No CVA Tenderness] : no ~M costovertebral angle tenderness [No Spinal Tenderness] : no spinal tenderness [Abnormal Walk] : normal gait [Nail Clubbing] : no clubbing  or cyanosis of the fingernails [Musculoskeletal - Swelling] : no joint swelling seen [Motor Tone] : muscle strength and tone were normal [] : no rash [Sensation] : the sensory exam was normal to light touch and pinprick [Motor Exam] : the motor exam was normal [No Focal Deficits] : no focal deficits [Oriented To Time, Place, And Person] : oriented to person, place, and time [Impaired Insight] : insight and judgment were intact [Affect] : the affect was normal

## 2019-11-19 NOTE — ASSESSMENT
[FreeTextEntry1] : \par 74 year old woman current smoker presents for CPE.\par \par 1. HCM: mammo referral given; check labs with physical today; counselled pt on smoking cessation, she is not interested in quitting or cutting back at this time; GI referral given for screening colonoscopy; flu shot given already at pharmacy this season\par \par Tobacco counselling: spent >10 mins with patient assessed current tobacco usage of 1/2 PPD which increased from 1/4 PPD; advised decrease tobacco intake and quit with nicotine replacement therapy; pt agreed to start Nicorette gum or Nicoderm patches; assisted patient with resources including tobacco cessation programs as needed and arranged for follow-up to monitor progress  \par \par Depression screen performed today, PHQ2=0\par \par Annual alcohol misuse screen, 15 mins, done; negative

## 2019-11-20 ENCOUNTER — TRANSCRIPTION ENCOUNTER (OUTPATIENT)
Age: 74
End: 2019-11-20

## 2019-11-20 LAB
25(OH)D3 SERPL-MCNC: 32.2 NG/ML
ALBUMIN SERPL ELPH-MCNC: 4.2 G/DL
ALP BLD-CCNC: 60 U/L
ALT SERPL-CCNC: 22 U/L
ANION GAP SERPL CALC-SCNC: 13 MMOL/L
AST SERPL-CCNC: 19 U/L
BASOPHILS # BLD AUTO: 0.06 K/UL
BASOPHILS NFR BLD AUTO: 0.9 %
BILIRUB SERPL-MCNC: 0.3 MG/DL
BUN SERPL-MCNC: 14 MG/DL
CALCIUM SERPL-MCNC: 9.6 MG/DL
CHLORIDE SERPL-SCNC: 101 MMOL/L
CHOLEST SERPL-MCNC: 170 MG/DL
CHOLEST/HDLC SERPL: 2.3 RATIO
CO2 SERPL-SCNC: 24 MMOL/L
CREAT SERPL-MCNC: 0.65 MG/DL
EOSINOPHIL # BLD AUTO: 0.08 K/UL
EOSINOPHIL NFR BLD AUTO: 1.2 %
ESTIMATED AVERAGE GLUCOSE: 120 MG/DL
GLUCOSE SERPL-MCNC: 103 MG/DL
HBA1C MFR BLD HPLC: 5.8 %
HCT VFR BLD CALC: 43.9 %
HDLC SERPL-MCNC: 75 MG/DL
HGB BLD-MCNC: 14.6 G/DL
IMM GRANULOCYTES NFR BLD AUTO: 0.4 %
LDLC SERPL CALC-MCNC: 83 MG/DL
LYMPHOCYTES # BLD AUTO: 1.65 K/UL
LYMPHOCYTES NFR BLD AUTO: 23.8 %
MAGNESIUM SERPL-MCNC: 2 MG/DL
MAN DIFF?: NORMAL
MCHC RBC-ENTMCNC: 32.1 PG
MCHC RBC-ENTMCNC: 33.3 GM/DL
MCV RBC AUTO: 96.5 FL
MONOCYTES # BLD AUTO: 0.63 K/UL
MONOCYTES NFR BLD AUTO: 9.1 %
NEUTROPHILS # BLD AUTO: 4.49 K/UL
NEUTROPHILS NFR BLD AUTO: 64.6 %
PLATELET # BLD AUTO: 323 K/UL
POTASSIUM SERPL-SCNC: 4.4 MMOL/L
PROT SERPL-MCNC: 6.5 G/DL
RBC # BLD: 4.55 M/UL
RBC # FLD: 12.6 %
SODIUM SERPL-SCNC: 138 MMOL/L
TRIGL SERPL-MCNC: 59 MG/DL
TSH SERPL-ACNC: 0.72 UIU/ML
WBC # FLD AUTO: 6.94 K/UL

## 2020-01-02 ENCOUNTER — FORM ENCOUNTER (OUTPATIENT)
Age: 75
End: 2020-01-02

## 2020-01-03 ENCOUNTER — APPOINTMENT (OUTPATIENT)
Dept: MAMMOGRAPHY | Facility: CLINIC | Age: 75
End: 2020-01-03

## 2020-01-03 ENCOUNTER — OUTPATIENT (OUTPATIENT)
Dept: OUTPATIENT SERVICES | Facility: HOSPITAL | Age: 75
LOS: 1 days | End: 2020-01-03
Payer: MEDICARE

## 2020-01-03 ENCOUNTER — APPOINTMENT (OUTPATIENT)
Dept: ULTRASOUND IMAGING | Facility: CLINIC | Age: 75
End: 2020-01-03

## 2020-01-03 DIAGNOSIS — Z00.8 ENCOUNTER FOR OTHER GENERAL EXAMINATION: ICD-10-CM

## 2020-01-03 PROCEDURE — 76641 ULTRASOUND BREAST COMPLETE: CPT | Mod: 26,50

## 2020-01-03 PROCEDURE — G0279: CPT | Mod: 26

## 2020-01-03 PROCEDURE — 77066 DX MAMMO INCL CAD BI: CPT | Mod: 26

## 2020-01-03 PROCEDURE — 77066 DX MAMMO INCL CAD BI: CPT

## 2020-01-03 PROCEDURE — 76641 ULTRASOUND BREAST COMPLETE: CPT

## 2020-01-03 PROCEDURE — G0279: CPT

## 2020-03-20 ENCOUNTER — INPATIENT (INPATIENT)
Facility: HOSPITAL | Age: 75
LOS: 3 days | Discharge: NURSING, REHAB OR LONG-TERM CARE | DRG: 65 | End: 2020-03-24
Attending: PSYCHIATRY & NEUROLOGY | Admitting: PSYCHIATRY & NEUROLOGY
Payer: MEDICARE

## 2020-03-20 VITALS
OXYGEN SATURATION: 97 % | SYSTOLIC BLOOD PRESSURE: 190 MMHG | DIASTOLIC BLOOD PRESSURE: 84 MMHG | RESPIRATION RATE: 18 BRPM | WEIGHT: 117.95 LBS | TEMPERATURE: 98 F | HEART RATE: 63 BPM | HEIGHT: 62 IN

## 2020-03-20 LAB
ALBUMIN SERPL ELPH-MCNC: 3.7 G/DL — SIGNIFICANT CHANGE UP (ref 3.3–5)
ALP SERPL-CCNC: 56 U/L — SIGNIFICANT CHANGE UP (ref 40–120)
ALT FLD-CCNC: 20 U/L — SIGNIFICANT CHANGE UP (ref 10–45)
ANION GAP SERPL CALC-SCNC: 14 MMOL/L — SIGNIFICANT CHANGE UP (ref 5–17)
APTT BLD: 29.3 SEC — SIGNIFICANT CHANGE UP (ref 27.5–36.3)
AST SERPL-CCNC: 30 U/L — SIGNIFICANT CHANGE UP (ref 10–40)
BASOPHILS # BLD AUTO: 0.07 K/UL — SIGNIFICANT CHANGE UP (ref 0–0.2)
BASOPHILS NFR BLD AUTO: 0.9 % — SIGNIFICANT CHANGE UP (ref 0–2)
BILIRUB SERPL-MCNC: 0.2 MG/DL — SIGNIFICANT CHANGE UP (ref 0.2–1.2)
BUN SERPL-MCNC: 11 MG/DL — SIGNIFICANT CHANGE UP (ref 7–23)
CALCIUM SERPL-MCNC: 9 MG/DL — SIGNIFICANT CHANGE UP (ref 8.4–10.5)
CHLORIDE SERPL-SCNC: 103 MMOL/L — SIGNIFICANT CHANGE UP (ref 96–108)
CO2 SERPL-SCNC: 20 MMOL/L — LOW (ref 22–31)
CREAT SERPL-MCNC: 0.46 MG/DL — LOW (ref 0.5–1.3)
EOSINOPHIL # BLD AUTO: 0.14 K/UL — SIGNIFICANT CHANGE UP (ref 0–0.5)
EOSINOPHIL NFR BLD AUTO: 1.8 % — SIGNIFICANT CHANGE UP (ref 0–6)
GLUCOSE SERPL-MCNC: 89 MG/DL — SIGNIFICANT CHANGE UP (ref 70–99)
HCT VFR BLD CALC: 39 % — SIGNIFICANT CHANGE UP (ref 34.5–45)
HGB BLD-MCNC: 13 G/DL — SIGNIFICANT CHANGE UP (ref 11.5–15.5)
IMM GRANULOCYTES NFR BLD AUTO: 0.3 % — SIGNIFICANT CHANGE UP (ref 0–1.5)
INR BLD: 1.09 RATIO — SIGNIFICANT CHANGE UP (ref 0.88–1.16)
LYMPHOCYTES # BLD AUTO: 2.36 K/UL — SIGNIFICANT CHANGE UP (ref 1–3.3)
LYMPHOCYTES # BLD AUTO: 29.6 % — SIGNIFICANT CHANGE UP (ref 13–44)
MCHC RBC-ENTMCNC: 31.3 PG — SIGNIFICANT CHANGE UP (ref 27–34)
MCHC RBC-ENTMCNC: 33.3 GM/DL — SIGNIFICANT CHANGE UP (ref 32–36)
MCV RBC AUTO: 94 FL — SIGNIFICANT CHANGE UP (ref 80–100)
MONOCYTES # BLD AUTO: 0.66 K/UL — SIGNIFICANT CHANGE UP (ref 0–0.9)
MONOCYTES NFR BLD AUTO: 8.3 % — SIGNIFICANT CHANGE UP (ref 2–14)
NEUTROPHILS # BLD AUTO: 4.72 K/UL — SIGNIFICANT CHANGE UP (ref 1.8–7.4)
NEUTROPHILS NFR BLD AUTO: 59.1 % — SIGNIFICANT CHANGE UP (ref 43–77)
NRBC # BLD: 0 /100 WBCS — SIGNIFICANT CHANGE UP (ref 0–0)
PLATELET # BLD AUTO: 281 K/UL — SIGNIFICANT CHANGE UP (ref 150–400)
POTASSIUM SERPL-MCNC: 4.5 MMOL/L — SIGNIFICANT CHANGE UP (ref 3.5–5.3)
POTASSIUM SERPL-SCNC: 4.5 MMOL/L — SIGNIFICANT CHANGE UP (ref 3.5–5.3)
PROT SERPL-MCNC: 6.3 G/DL — SIGNIFICANT CHANGE UP (ref 6–8.3)
PROTHROM AB SERPL-ACNC: 12.6 SEC — SIGNIFICANT CHANGE UP (ref 10–12.9)
RBC # BLD: 4.15 M/UL — SIGNIFICANT CHANGE UP (ref 3.8–5.2)
RBC # FLD: 12.7 % — SIGNIFICANT CHANGE UP (ref 10.3–14.5)
SODIUM SERPL-SCNC: 137 MMOL/L — SIGNIFICANT CHANGE UP (ref 135–145)
WBC # BLD: 7.97 K/UL — SIGNIFICANT CHANGE UP (ref 3.8–10.5)
WBC # FLD AUTO: 7.97 K/UL — SIGNIFICANT CHANGE UP (ref 3.8–10.5)

## 2020-03-20 PROCEDURE — 70498 CT ANGIOGRAPHY NECK: CPT | Mod: 26

## 2020-03-20 PROCEDURE — 99285 EMERGENCY DEPT VISIT HI MDM: CPT

## 2020-03-20 PROCEDURE — 70496 CT ANGIOGRAPHY HEAD: CPT | Mod: 26

## 2020-03-20 PROCEDURE — 93010 ELECTROCARDIOGRAM REPORT: CPT

## 2020-03-20 NOTE — ED PROVIDER NOTE - PHYSICAL EXAMINATION
General: Well appearing, awake, alert, oriented, no acute distress. Resting in bed.  HEENT: PERRLA EOMI. No trauma/bruising noted to head or face. No lip/tongue/throat swelling noted on exam. Tonsils wnl, no exudate noted. Uvula midline. No rhinorrhea noted.   CV: Regular rate and rhythm, S1/S2, no murmurs/rubs/gallops noted on exam. No tenderness to palpation to chest wall.   Lungs: Clear to ascultation bilaterally, no wheezes/crackles/rales noted on exam. Equal chest wall excursion noted.   Abdomen: Soft, non tender, non distended, no guarding or rebound.   MSK: Intact ROM of upper and lower extremities bilaterally. Intact ROM of neck. No gross deformities noted to extremities. Neck supple.   Neuro: Awake, A+O x4, moving all extremities spontaneously. CN 2-12 grossly intact. No nystagmus noted lying flat or sitting up. Strength and sensation grossly intact to all extremities. Speech fluent, no slurred speech. No pronator drift. +UNABLE TO AMBULATE WITHOUT ASSISTANCE, UNSTEADY ON FEET.   Extremities: No swelling or edema noted to extremities. No calf tenderness to palpation.   Skin: No rash noted on exam.

## 2020-03-20 NOTE — ED PROVIDER NOTE - CLINICAL SUMMARY MEDICAL DECISION MAKING FREE TEXT BOX
74F presenting w 3 days subjective left side numbness, as well as unsteady gait. No headache, dizziness. No focal weakness. Exam as above, notable for inability to stand or ambulate independently. Concern for intracranial pathology. Will check labs, CT head, CTA head/neck, neurology consult. Currently stable, no acute distress. Will continue to follow up and re-assess. Case discussed with Attending.   Kelvin Polk MD, PGY3 Emergency Medicine

## 2020-03-20 NOTE — ED PROVIDER NOTE - PROGRESS NOTE DETAILS
CT CTA performed, awaiting results. Neurology paged. Patient resting in bed.  Kelvin Polk MD, PGY3 Emergency Medicine CT CTA w/o acute pathology. Patient still unable to ambulate unassisted. Neuro advised admit to their service for MRI and further care  Kelvin Polk MD, PGY3 Emergency Medicine

## 2020-03-20 NOTE — ED PROVIDER NOTE - NS ED ROS FT
Please see HPI section of chart for further detailed Review of Systems    unsteady gait  left side numbness

## 2020-03-20 NOTE — ED ADULT NURSE NOTE - CHPI ED NUR SYMPTOMS NEG
no nausea/no blurred vision/no fever/no change in level of consciousness/no vomiting/no confusion/no loss of consciousness

## 2020-03-20 NOTE — ED PROVIDER NOTE - ATTENDING CONTRIBUTION TO CARE
attending Mir: 74yF smoker h/o vertigo p/w L sided numbness x 3 days. Reports numbness to LUE and LLE with associated unsteady gait. Denies facial numbness or focal weakness. No prior stroke or TIA. Denies falls. Pt unable to stand or ambulate independently. Will obtain labs, CT imaging, neuro eval and admission

## 2020-03-20 NOTE — ED PROVIDER NOTE - OBJECTIVE STATEMENT
74F, vertigo 2 yrs ago, presents to ED with chief complaint of left numbness and unsteady gait. Patient reports waking up Tuesday w/ unsteady gait. She has had unsteady gait intermittently since that time (but NOT room spinning, dizziness). In addition, she reports subjective LUE numbness/tingling and left foot numbness/tingling. Also reports being unable to "focus her vision". Patient states she came tonight due to duration of sx. She had to call her son to help her stand/walk. DENIES associated slurred speech, facial droop, headache, dizziness, lightheadedness, diplopia, focal weakness to any extremity, right sided numbness, fevers or chills, nausea or vomiting, chest pain, shortness of breath, cough, abdominal pain, diarrhea, urinary sx, black or bloody stools. Patient denies any prior hx of similar sx. Denies hx CVA, TIA. No recent trauma, falls. Denies recent illness, travel. Denies other med hx. Denies meds. No allergies. +SMOKER. No drugs, heavy alcohol use. PMD Dr BRENT Ahn.

## 2020-03-20 NOTE — ED ADULT NURSE NOTE - OBJECTIVE STATEMENT
c/o left sided numbness/tingling/weakness x 4 days. Pt states, " I have a history of vertigo, I havent had any attacks for 2 yrs but for the past 4 days I have left sides numbness and tinglinging and feeling dizzy and each day it gets worse. Pt denies headache blurred vision lightheadedness, cough , sore throat, chest pain, SOB, abdiminal pain N/V/D urinary symptoms. On assessment patient is neurologically intact . Speaking in full sentences. Pt had equal strength of all 4 extremities although when she stands up her gait is unsteady and needs assistance to walk short distances.

## 2020-03-20 NOTE — ED ADULT NURSE NOTE - NSIMPLEMENTINTERV_GEN_ALL_ED
Implemented All Universal Safety Interventions:  [place] to call system. Call bell, personal items and telephone within reach. Instruct patient to call for assistance. Room bathroom lighting operational. Non-slip footwear when patient is off stretcher. Physically safe environment: no spills, clutter or unnecessary equipment. Stretcher in lowest position, wheels locked, appropriate side rails in place.

## 2020-03-21 DIAGNOSIS — R26.81 UNSTEADINESS ON FEET: ICD-10-CM

## 2020-03-21 PROCEDURE — 70551 MRI BRAIN STEM W/O DYE: CPT | Mod: 26

## 2020-03-21 PROCEDURE — 99223 1ST HOSP IP/OBS HIGH 75: CPT

## 2020-03-21 RX ORDER — MECLIZINE HCL 12.5 MG
25 TABLET ORAL THREE TIMES A DAY
Refills: 0 | Status: DISCONTINUED | OUTPATIENT
Start: 2020-03-21 | End: 2020-03-24

## 2020-03-21 RX ORDER — ASPIRIN/CALCIUM CARB/MAGNESIUM 324 MG
81 TABLET ORAL DAILY
Refills: 0 | Status: DISCONTINUED | OUTPATIENT
Start: 2020-03-21 | End: 2020-03-24

## 2020-03-21 RX ORDER — ATORVASTATIN CALCIUM 80 MG/1
80 TABLET, FILM COATED ORAL AT BEDTIME
Refills: 0 | Status: DISCONTINUED | OUTPATIENT
Start: 2020-03-21 | End: 2020-03-23

## 2020-03-21 RX ORDER — ENOXAPARIN SODIUM 100 MG/ML
40 INJECTION SUBCUTANEOUS DAILY
Refills: 0 | Status: DISCONTINUED | OUTPATIENT
Start: 2020-03-21 | End: 2020-03-24

## 2020-03-21 RX ADMIN — ENOXAPARIN SODIUM 40 MILLIGRAM(S): 100 INJECTION SUBCUTANEOUS at 13:22

## 2020-03-21 RX ADMIN — ATORVASTATIN CALCIUM 80 MILLIGRAM(S): 80 TABLET, FILM COATED ORAL at 21:55

## 2020-03-21 RX ADMIN — Medication 25 MILLIGRAM(S): at 21:56

## 2020-03-21 RX ADMIN — Medication 81 MILLIGRAM(S): at 13:22

## 2020-03-21 NOTE — OCCUPATIONAL THERAPY INITIAL EVALUATION ADULT - LIVES WITH, PROFILE
other relative/Pt lives with granddaughter (22yo) in a co-op with 6 steps to enter, with handrail. No stairs inside. (I) ADLs and functional transfers without AD/DME

## 2020-03-21 NOTE — H&P ADULT - NSHPPHYSICALEXAM_GEN_ALL_CORE
Exam:   MS: Alert and oriented to self, place and time. Attentive. Language fluent w/ intact comprehension and repetition. No extinction on double simultaneous stimulation   CN: VFF. ALTAGRACIA. EOMI. V1-V3 intact. Face symmetric. T/u midline. Normal shrug.   Motor: Increased tone on the left. LUE drift. Otherwise full strength throughout.   Reflexes: 3+ in the LUE/LLE. 2+ RUE/RLE. R. Babinski present.   Sensation: Intact to LT throughout. JPS and Vibration intact.   Coordination: Mild intention tremor noted on FNF and H2S bilaterally (L >R). Mild dymetria noted on finger elizabeth or toe-to-finger bilateraly. Mild Diadochodyskinesia note on rapid alternating hand movements on the left.   Gait: Very wide based, staggering, ataxic gait. Decrease clearance in the left leg. Exam:   MS: Alert and oriented to self, place and time. Attentive. Language fluent w/ intact comprehension and repetition. No extinction on double simultaneous stimulation   CN: VFF. ALTAGRACIA. EOMI. V1-V3 intact. Face symmetric. T/u midline. Normal shrug.   Motor: Increased tone on the left. LUE drift. Otherwise full strength throughout.   Reflexes: 3+ in the LUE/LLE. 2+ RUE/RLE. L. Babinski present.   Sensation: Intact to LT throughout. JPS and Vibration intact.   Coordination: Mild intention tremor noted on FNF and H2S bilaterally (L >R). Mild dymetria noted on finger elizabeth or toe-to-finger bilateral. Mild Disdiadochokinesia note on rapid alternating hand movements on the left.   Gait: Very wide based, staggering, ataxic gait. Decrease clearance in the left leg. Exam:   MS: Alert and oriented to self, place and time. Attentive. Language fluent w/ intact comprehension and repetition. No extinction on double simultaneous stimulation   CN: VFF. ALTAGRACIA. EOMI. V1-V3 intact. Face symmetric. T/u midline. Normal shrug.   Motor: Increased tone on the left. LUE drift. Otherwise full strength throughout.   Reflexes: 3+ in the LUE/LLE. 2+ RUE/RLE. L. Babinski present.   Sensation: Intact to LT throughout. JPS and Vibration intact.   Coordination: Mild intention tremor noted on FNF and H2S bilaterally (L >R). Mild dysmetria noted on finger elizabeth or toe-to-finger bilateral. Mild Disdiadochokinesia note on rapid alternating hand movements on the left.   Gait: Very wide based, staggering, ataxic gait. Decrease clearance in the left leg.

## 2020-03-21 NOTE — PHYSICAL THERAPY INITIAL EVALUATION ADULT - PRECAUTIONS/LIMITATIONS, REHAB EVAL
Patient states she woke up on Tuesday 3/17 and noticed difficulty ambulating. No room spinning sensation noted. States she was just off balance when walking. Swaying to both sides. Has not remitted since. She also notes that she felt "numb" in her LUE and LLE (now resolved)./fall precautions

## 2020-03-21 NOTE — SBIRT NOTE ADULT - NSSBIRTBRIEFINTDET_GEN_A_CORE
Patient does not feel her drinking affects her ability to function nor an issue however admits to medicine interaction with alcohol.

## 2020-03-21 NOTE — PHYSICAL THERAPY INITIAL EVALUATION ADULT - ADDITIONAL COMMENTS
Pt resides in co-op with granddaughter, about 6 steps to enter with handrail, one level within. PTA independent with mobility & ADL's, (+)driving, (+)working.

## 2020-03-21 NOTE — H&P ADULT - ATTENDING COMMENTS
Ms. Ascencio is a 74F w/ PMH of peripheral vertigo (intermittent for past two years) presents with acute onset gait disturbance, RUE drift, ataxia (truncal > limb), wide based gait per . On neurological exam she has no dysarthria, dysphagia or hemiparesis. On speaking with Physical therapy she has left lower limb weakness and ataxia during assessment.  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   Continue with Aspirin for secondary stroke prevention.   Agree with Atorvastatin 80 mg at bedtime, titrate the dose according to LDL.   TTE with bubble study and continue with telemetry to screen for the cardiac source of embolism.    HbA1C and LDL.   PT/OT/Speech and swallow/safety eval.

## 2020-03-21 NOTE — PHYSICAL THERAPY INITIAL EVALUATION ADULT - CRITERIA FOR SKILLED THERAPEUTIC INTERVENTIONS
functional limitations in following categories/predicted duration of therapy intervention/anticipated equipment needs at discharge/impairments found/therapy frequency/anticipated discharge recommendation/rehab potential

## 2020-03-21 NOTE — H&P ADULT - ASSESSMENT
74F w/ PMH of peripheral vertigo (intermittent for past two years) presents with acute onset gait disturbance, RUE drift, ataxia (truncal > limb), wide based gait.     Impression: Acute onset ataxia (truncal > limb) + subtle L. 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 Mechanism unknown though would favor .     Plan:   [] MRI brain w/o contrast  [] CTA h/n: negative   [] TTE w/ bubble   []Continue ASA 81, Statin   [] Lovenox for DVT ppx  [] A1c, Lipid panel    [] Keep BP permissive w/ goal of 220/105   [] PT/OT  [] Cardiac tele 74F w/ PMH of peripheral vertigo (intermittent for past two years) presents with acute onset gait disturbance, RUE drift, ataxia (truncal > limb), wide based gait.     Impression: Acute onset ataxia (truncal > limb) + subtle L. 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 Mechanism unknown though would favor .     Plan:   [] MRI brain w/o contrast  [] CTH: Read as negative, though she may have R. pontomedullary hypodensity vs. artifact. It is apparent on both the axials and coronals raising the suspicion.   [] CTA h/n: negative   [] TTE w/ bubble   []Continue ASA 81, Statin   [] Lovenox for DVT ppx  [] A1c, Lipid panel    [] Keep BP permissive w/ goal of 220/105   [] PT/OT  [] Cardiac tele 74F w/ PMH of peripheral vertigo (intermittent for past two years) presents with acute onset gait disturbance, RUE drift, ataxia (truncal > limb), wide based gait.     Impression: Acute onset ataxia (truncal > limb) + subtle L. 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 Mechanism unknown though would favor .     Plan:   [] MRI brain w/o contrast  [] CTH: Read as negative, though she may have a pontine hypodensity extending from R. pontomedullary junction up into the basis pontis vs. artifact. It is apparent on both the axials and coronals raising the suspicion.   [] CTA h/n: negative   [] TTE w/ bubble   []Continue ASA 81, Statin   [] Lovenox for DVT ppx  [] A1c, Lipid panel    [] Keep BP permissive w/ goal of 220/105   [] PT/OT  [] Cardiac tele

## 2020-03-21 NOTE — H&P ADULT - HISTORY OF PRESENT ILLNESS
74F w/ PMH of peripheral vertigo (intermittent for past two years) presents with complaints of unsteady gait.     Patient states she woke up on Tuesday 3/17 and noticed difficulty ambulating. No room spinning sensation noted. States she was just off balance when walking. Swaying to both sides. Has not remitted since. She also notes that she felt "numb" in her LUE and LLE (now resolved). Denies changes in speech, vision, hearing, swallowing, voice quality, numbness/tingling, weakness. No previous history of CVA/TIA, seizures, migraines. No history of arrythmia. No AC.     Patient says she has balance problems at baseline, though has never had trouble ambulating unassisted. This week she was been unable to walk even a few steps. No fevers, chills, cough.

## 2020-03-21 NOTE — PHYSICAL THERAPY INITIAL EVALUATION ADULT - GENERAL OBSERVATIONS, REHAB EVAL
Pt received semisupine in bed, A&Ox4, following commands, pleasant & eager to participate, a/w unsteady gait, p/w left sided weakness & impaired coordination, right eye visual impairments.

## 2020-03-21 NOTE — OCCUPATIONAL THERAPY INITIAL EVALUATION ADULT - ADDITIONAL COMMENTS
CT Head 3/20- CT HEAD: No acute intracranial bleeding, mass effect, or shift. CTA BRAIN: Atheromatous irregularity along the carotid siphons bilaterally. Otherwise, no flow-limiting stenosis or occlusion. CTA NECK: Atheromatous calcification along the carotid bulbs bilaterally without significant stenosis. No flow limiting stenosis or occlusion.

## 2020-03-21 NOTE — PHYSICAL THERAPY INITIAL EVALUATION ADULT - PERTINENT HX OF CURRENT PROBLEM, REHAB EVAL
Pt is 74F admitted [facility] peripheral vertigo (intermittent for past two years) presents with complaints of unsteady gait.

## 2020-03-21 NOTE — OCCUPATIONAL THERAPY INITIAL EVALUATION ADULT - PLANNED THERAPY INTERVENTIONS, OT EVAL
balance training/ADL retraining/bed mobility training/cognitive, visual perceptual/neuromuscular re-education/transfer training

## 2020-03-21 NOTE — CHART NOTE - NSCHARTNOTEFT_GEN_A_CORE
Obtain screening lower extremity venous ultrasound in patients who meet 1 or more of the following criteria as patient is high risk for DVT/PE on admission:   [] History of DVT/PE  []Hypercoagulable states (Factor V Leiden, Cancer, OCP, etc. )  []Prolonged immobility (hemiplegia/hemiparesis/post operative or any other extended immobilization)  [] Transferred from outside facility (Rehab or Long term care)  [] Age </= to 50

## 2020-03-21 NOTE — OCCUPATIONAL THERAPY INITIAL EVALUATION ADULT - PERTINENT HX OF CURRENT PROBLEM, REHAB EVAL
74F w/ PMH of peripheral vertigo (intermittent for past two years) presents with complaints of unsteady gait. Pt states she woke up on Tuesday 3/17 and noticed difficulty ambulating. No room spinning sensation noted. States she was just off balance when walking. Swaying to both sides.

## 2020-03-21 NOTE — PHYSICAL THERAPY INITIAL EVALUATION ADULT - ASR WT BEARING STATUS EVAL
no weight-bearing restrictions/CT HEAD: No acute intracranial bleeding, mass effect, or shift. CTA BRAIN: Atheromatous irregularity along the carotid siphons bilaterally. Otherwise, no flow-limiting stenosis or occlusion. CTA NECK: Atheromatous calcification along the carotid bulbs bilaterally without significant stenosis. No flow limiting stenosis or occlusion.

## 2020-03-22 LAB
HBA1C BLD-MCNC: 5.9 % — HIGH (ref 4–5.6)
HCV AB S/CO SERPL IA: 0.19 S/CO — SIGNIFICANT CHANGE UP (ref 0–0.99)
HCV AB SERPL-IMP: SIGNIFICANT CHANGE UP
TSH SERPL-MCNC: 0.98 UIU/ML — SIGNIFICANT CHANGE UP (ref 0.27–4.2)

## 2020-03-22 PROCEDURE — 99233 SBSQ HOSP IP/OBS HIGH 50: CPT

## 2020-03-22 PROCEDURE — 93306 TTE W/DOPPLER COMPLETE: CPT | Mod: 26

## 2020-03-22 RX ORDER — METOPROLOL TARTRATE 50 MG
25 TABLET ORAL DAILY
Refills: 0 | Status: DISCONTINUED | OUTPATIENT
Start: 2020-03-22 | End: 2020-03-24

## 2020-03-22 RX ADMIN — Medication 81 MILLIGRAM(S): at 12:00

## 2020-03-22 RX ADMIN — Medication 25 MILLIGRAM(S): at 12:00

## 2020-03-22 RX ADMIN — ENOXAPARIN SODIUM 40 MILLIGRAM(S): 100 INJECTION SUBCUTANEOUS at 12:00

## 2020-03-22 RX ADMIN — Medication 25 MILLIGRAM(S): at 21:22

## 2020-03-22 RX ADMIN — Medication 25 MILLIGRAM(S): at 13:55

## 2020-03-22 RX ADMIN — Medication 25 MILLIGRAM(S): at 06:02

## 2020-03-22 RX ADMIN — ATORVASTATIN CALCIUM 80 MILLIGRAM(S): 80 TABLET, FILM COATED ORAL at 21:22

## 2020-03-22 NOTE — PROGRESS NOTE ADULT - ASSESSMENT
ASSESSMENT: 74ywoman with PMH of peripheral vertigo (intermittent for past two years) presents with acute onset gait disturbance, RUE drift, ataxia (truncal > limb), wide based gait. Brain MRI shows multiple foci of diffusion restriction in the right medial temporal lobe, right occipital cortex, right posterior limb of internal capsule and left centrum semiovale and possible midportion of the medulla. CTA Head/Neck shows no stenosis    Impression: Multiple foci of diffusion restriction in the right medial temporal lobe, right occipital cortex, right posterior limb of internal capsule and left centrum semiovale and possible midportion of the medulla likely embolic. Unknown source     NEURO: Continue close monitoring for neurologic deterioration, permissive HTN with slow titration to normotensive, titrate statin to LDL goal less than 70, MRI Brain w/o, CTA Head and Neck results as above. Physical therapy/OT eval with recommendations for AR, continue current rehab /treatment    ANTITHROMBOTIC THERAPY: ASA for secondary stroke prevention    PULMONARY: CXR clear, protecting airway, saturating well     CARDIOVASCULAR: check TTE, cardiac monitoring 11 seconds of wide comlex tachycardia on 03/22/202, will continue to monitor                             SBP goal: 120-180mmHg    GASTROINTESTINAL:  dysphagia screen passed, tolerating diet     Diet: Regular    RENAL: BUN/Cr stable, good urine output      Na Goal: Greater than 135     Osborne: N    HEMATOLOGY: H/H stable, Platelets normal      DVT ppx:  LMWH      ID: afebrile, no leukocytosis     OTHER:     DISPOSITION: AR as per PT/OT eval, will contact PMR, D/C once workup is complete    CORE MEASURES:        Admission NIHSS:      TPA:  NO      LDL/HDL: P     Depression Screen: 0     Statin Therapy: Y     Dysphagia Screen:  PASS      Smoking  NO      Afib  NO     Stroke Education YES    Obtain screening ultrasound in patients who meet 1 or more of the following criteria as patient is high risk for DVT/PE upon admission:   [] History of DVT/PE  []Hypercoagulable states (Factor V Leiden, Cancer, OCP, etc. )  []Prolonged immobility (hemiplegia/hemiparesis/post operative or any other extended immobilization)   [] Transferred from outside facility (Rehab or Long term care)  [] Age </= to 50 ASSESSMENT: 74ywoman with PMH of peripheral vertigo (intermittent for past two years) presents with acute onset gait disturbance, RUE drift, ataxia (truncal > limb), wide based gait. Brain MRI shows multiple foci of diffusion restriction in the right medial temporal lobe, right occipital cortex, right posterior limb of internal capsule and left centrum semiovale and possible midportion of the medulla. CTA Head/Neck shows no stenosis    Impression: Multiple foci of diffusion restriction in the right medial temporal lobe, right occipital cortex, right posterior limb of internal capsule and left centrum semiovale and possible midportion of the medulla. Etiology: embolic, mechanism unknown,     NEURO: Continue close monitoring for neurologic deterioration, permissive HTN with slow titration to normotensive, titrate statin to LDL goal less than 70, MRI Brain w/o, CTA Head and Neck results as above. Physical therapy/OT eval with recommendations for AR, continue current rehab /treatment    ANTITHROMBOTIC THERAPY: ASA for secondary stroke prevention    PULMONARY: Protecting airway, saturating well     CARDIOVASCULAR: check TTE, cardiac monitoring short run of PAT on 03/22/202,  as per cardiology will start low dose of beta blocker due ot hx of bradycardia: Toprol 25mg. Will continue to monitor. plan to obtain LOOP to monitor for occult arrhythmias                             SBP goal: 120-180mmHg    GASTROINTESTINAL:  dysphagia screen passed, tolerating diet     Diet: Regular    RENAL: BUN/Cr stable, good urine output      Na Goal: Greater than 135     Osborne: N    HEMATOLOGY: H/H stable, Platelets normal      DVT ppx:  LMWH      ID: afebrile, no leukocytosis     OTHER: Plan/goals of care discussed with pt at bedside and with pt's son over the phone.     DISPOSITION: AR as per PT/OT eval, will contact PMR, D/C once workup is complete    CORE MEASURES:        Admission NIHSS:      TPA:  NO      LDL/HDL: P     Depression Screen: 0     Statin Therapy: Y     Dysphagia Screen:  PASS      Smoking  NO      Afib  NO     Stroke Education YES    Obtain screening ultrasound in patients who meet 1 or more of the following criteria as patient is high risk for DVT/PE upon admission:   [] History of DVT/PE  []Hypercoagulable states (Factor V Leiden, Cancer, OCP, etc. )  []Prolonged immobility (hemiplegia/hemiparesis/post operative or any other extended immobilization)   [] Transferred from outside facility (Rehab or Long term care)  [] Age </= to 50 ASSESSMENT: 74ywoman with PMH of peripheral vertigo (intermittent for past two years) presents with acute onset gait disturbance, RUE drift, ataxia (truncal > limb), wide based gait. Brain MRI shows multiple foci of diffusion restriction in the right medial temporal lobe, right occipital cortex, right posterior limb of internal capsule and left centrum semiovale and possible midportion of the medulla. CTA Head/Neck shows no stenosis    Impression: Multiple Vascular territory infarction - right medial temporal lobe, right occipital cortex, right posterior limb of internal capsule and left centrum semiovale and possible midportion of the medulla.  Undetermined etiology - suspect cardioembolism    NEURO: Continue close monitoring for neurologic deterioration, permissive HTN with slow titration to normotensive, titrate statin to LDL goal less than 70, MRI Brain w/o, CTA Head and Neck results as above. Physical therapy/OT eval with recommendations for AR, continue current rehab /treatment    ANTITHROMBOTIC THERAPY: ASA for secondary stroke prevention    PULMONARY: Protecting airway, saturating well     CARDIOVASCULAR: check TTE, cardiac monitoring short run of PAT on 03/22/202,  as per cardiology will start low dose of beta blocker due ot hx of bradycardia: Toprol 25mg. Will continue to monitor. plan to obtain LOOP to monitor for occult arrhythmias;   BINH can be done inpatient or outpatient depending on timing of her discharge/ disposition                      SBP goal: 120-140mmHg    GASTROINTESTINAL:  dysphagia screen passed, tolerating diet     Diet: Regular    RENAL: BUN/Cr stable, good urine output      Na Goal: Greater than 135     Osborne: N    HEMATOLOGY: H/H stable, Platelets normal      DVT ppx:  LMWH      ID: afebrile, no leukocytosis     OTHER: Plan/goals of care discussed with pt at bedside and with pt's son over the phone.     DISPOSITION: AR as per PT/OT eval, will contact PMR, D/C once workup is complete    CORE MEASURES:        Admission NIHSS:      TPA:  NO      LDL/HDL: P     Depression Screen: 0     Statin Therapy: Y     Dysphagia Screen:  PASS      Smoking  NO      Afib  NO     Stroke Education YES    Obtain screening ultrasound in patients who meet 1 or more of the following criteria as patient is high risk for DVT/PE upon admission:   [] History of DVT/PE  []Hypercoagulable states (Factor V Leiden, Cancer, OCP, etc. )  []Prolonged immobility (hemiplegia/hemiparesis/post operative or any other extended immobilization)   [] Transferred from outside facility (Rehab or Long term care)  [] Age </= to 50

## 2020-03-22 NOTE — PROGRESS NOTE ADULT - SUBJECTIVE AND OBJECTIVE BOX
THE PATIENT WAS SEEN AND EXAMINED BY ME WITH THE HOUSESTAFF AND STROKE TEAM DURING MORNING ROUNDS.   HPI:  74F w/ PMH of peripheral vertigo (intermittent for past two years) presents with complaints of unsteady gait. Patient states she woke up on Tuesday 3/17 and noticed difficulty ambulating. No room spinning sensation noted. States she was just off balance when walking. Swaying to both sides. Has not remitted since. She also notes that she felt "numb" in her LUE and LLE (now resolved). Denies changes in speech, vision, hearing, swallowing, voice quality, numbness/tingling, weakness. No previous history of CVA/TIA, seizures, migraines. No history of arrythmia. No AC. Patient says she has balance problems at baseline, though has never had trouble ambulating unassisted. For the past 1  week prior to admission, she has been unable to walk even a few steps. No fevers, chills, cough.     SUBJECTIVE: No events overnight.  No new neurologic complaints.      aspirin enteric coated 81 milliGRAM(s) Oral daily  atorvastatin 80 milliGRAM(s) Oral at bedtime  enoxaparin Injectable 40 milliGRAM(s) SubCutaneous daily  meclizine 25 milliGRAM(s) Oral three times a day      PHYSICAL EXAM:   Vital Signs Last 24 Hrs  T(C): 36.4 (22 Mar 2020 07:38), Max: 36.9 (21 Mar 2020 23:22)  T(F): 97.6 (22 Mar 2020 07:38), Max: 98.4 (21 Mar 2020 23:22)  HR: 65 (22 Mar 2020 07:38) (56 - 102)  BP: 151/75 (22 Mar 2020 07:38) (138/61 - 179/80)  BP(mean): --  RR: 18 (22 Mar 2020 07:38) (18 - 19)  SpO2: 97% (22 Mar 2020 07:38) (96% - 98%)    General: No acute distress  HEENT: EOM intact, visual fields full  Abdomen: Soft, nontender, nondistended   Extremities: No edema    NEUROLOGICAL EXAM:  Mental status: Eyes open, awake, alert, oriented x3, fluent speech no neglect, able to follow commands  Cranial Nerves: No facial asymmetry, no nystagmus, no dysarthria.  Motor exam: Normal tone, RUE 5/5, RLE 5/5, Left side hemiparesis LUE drift, antigravity, LLE antigravity  Sensation: Intact to light touch   Coordination/ Gait: No dysmetria.    LABS:                        13.0   7.97  )-----------( 281      ( 20 Mar 2020 22:31 )             39.0    03-20    137  |  103  |  11  ----------------------------<  89  4.5   |  20<L>  |  0.46<L>    Ca    9.0      20 Mar 2020 20:32    TPro  6.3  /  Alb  3.7  /  TBili  0.2  /  DBili  x   /  AST  30  /  ALT  20  /  AlkPhos  56  03-20  PT/INR - ( 20 Mar 2020 22:31 )   PT: 12.6 sec;   INR: 1.09 ratio         PTT - ( 20 Mar 2020 22:31 )  PTT:29.3 sec      IMAGING: Reviewed by me.     Brain MRI (03/21/20): Multiple foci of diffusion restriction in the right medial temporal lobe, right occipital cortex, right posterior limb of internal capsule and left centrum semiovale and possible midportion of the medulla which may be related to cardioembolic source versus hypercoagulability. Moderate atrophy and small vessel white matter ischemic changes.    Head CT CTA Head/Neck 03/21/20: CT HEAD: No acute intracranial bleeding, mass effect, or shift.  CTA BRAIN: Atheromatous irregularity along the carotid siphons bilaterally. Otherwise, no flow-limiting stenosis or occlusion.     CTA NECK: Atheromatous calcification along the carotid bulbs bilaterally without significant stenosis. No flow limiting stenosis or occlusion. THE PATIENT WAS SEEN AND EXAMINED BY ME WITH THE HOUSESTAFF AND STROKE TEAM DURING MORNING ROUNDS.   HPI:  74F w/ PMH of peripheral vertigo (intermittent for past two years) presents with complaints of unsteady gait. Patient states she woke up on Tuesday 3/17 and noticed difficulty ambulating. No room spinning sensation noted. States she was just off balance when walking. Swaying to both sides. Has not remitted since. She also notes that she felt "numb" in her LUE and LLE (now resolved). Denies changes in speech, vision, hearing, swallowing, voice quality, numbness/tingling, weakness. No previous history of CVA/TIA, seizures, migraines. No history of arrythmia. No AC. Patient says she has balance problems at baseline, though has never had trouble ambulating unassisted. For the past 1  week prior to admission, she has been unable to walk even a few steps. No fevers, chills, cough.     SUBJECTIVE: c/o dizziness.        aspirin enteric coated 81 milliGRAM(s) Oral daily  atorvastatin 80 milliGRAM(s) Oral at bedtime  enoxaparin Injectable 40 milliGRAM(s) SubCutaneous daily  meclizine 25 milliGRAM(s) Oral three times a day      PHYSICAL EXAM:   Vital Signs Last 24 Hrs  T(C): 36.4 (22 Mar 2020 07:38), Max: 36.9 (21 Mar 2020 23:22)  T(F): 97.6 (22 Mar 2020 07:38), Max: 98.4 (21 Mar 2020 23:22)  HR: 65 (22 Mar 2020 07:38) (56 - 102)  BP: 151/75 (22 Mar 2020 07:38) (138/61 - 179/80)  BP(mean): --  RR: 18 (22 Mar 2020 07:38) (18 - 19)  SpO2: 97% (22 Mar 2020 07:38) (96% - 98%)    General: No acute distress  HEENT: EOM intact, visual fields full  Abdomen: Soft, nontender, nondistended   Extremities: No edema    NEUROLOGICAL EXAM:  Mental status: Eyes open, awake, alert, oriented x3, fluent speech no neglect, able to follow commands  Cranial Nerves: No facial asymmetry, no nystagmus, no dysarthria.  Motor exam: Normal tone, RUE 5/5, RLE 5/5, Left side hemiparesis LUE drift, antigravity, LLE antigravity  Sensation: Intact to light touch   Coordination/ Gait: No dysmetria.    LABS:                        13.0   7.97  )-----------( 281      ( 20 Mar 2020 22:31 )             39.0    03-20    137  |  103  |  11  ----------------------------<  89  4.5   |  20<L>  |  0.46<L>    Ca    9.0      20 Mar 2020 20:32    TPro  6.3  /  Alb  3.7  /  TBili  0.2  /  DBili  x   /  AST  30  /  ALT  20  /  AlkPhos  56  03-20  PT/INR - ( 20 Mar 2020 22:31 )   PT: 12.6 sec;   INR: 1.09 ratio         PTT - ( 20 Mar 2020 22:31 )  PTT:29.3 sec      IMAGING: Reviewed by me.     Brain MRI (03/21/20): Multiple foci of diffusion restriction in the right medial temporal lobe, right occipital cortex, right posterior limb of internal capsule and left centrum semiovale and possible midportion of the medulla which may be related to cardioembolic source versus hypercoagulability. Moderate atrophy and small vessel white matter ischemic changes.    Head CT CTA Head/Neck 03/21/20: CT HEAD: No acute intracranial bleeding, mass effect, or shift.  CTA BRAIN: Atheromatous irregularity along the carotid siphons bilaterally. Otherwise, no flow-limiting stenosis or occlusion.     CTA NECK: Atheromatous calcification along the carotid bulbs bilaterally without significant stenosis. No flow limiting stenosis or occlusion. THE PATIENT WAS SEEN AND EXAMINED BY ME WITH THE HOUSESTAFF AND STROKE TEAM DURING MORNING ROUNDS.   HPI:  74F w/ PMH of peripheral vertigo (intermittent for past two years) presents with complaints of unsteady gait. Patient states she woke up on Tuesday 3/17 and noticed difficulty ambulating. No room spinning sensation noted. States she was just off balance when walking. Swaying to both sides. Has not remitted since. She also notes that she felt "numb" in her LUE and LLE (now resolved). Denies changes in speech, vision, hearing, swallowing, voice quality, numbness/tingling, weakness. No previous history of CVA/TIA, seizures, migraines.     SUBJECTIVE: c/o dizziness.        aspirin enteric coated 81 milliGRAM(s) Oral daily  atorvastatin 80 milliGRAM(s) Oral at bedtime  enoxaparin Injectable 40 milliGRAM(s) SubCutaneous daily  meclizine 25 milliGRAM(s) Oral three times a day      PHYSICAL EXAM:   Vital Signs Last 24 Hrs  T(C): 36.4 (22 Mar 2020 07:38), Max: 36.9 (21 Mar 2020 23:22)  T(F): 97.6 (22 Mar 2020 07:38), Max: 98.4 (21 Mar 2020 23:22)  HR: 65 (22 Mar 2020 07:38) (56 - 102)  BP: 151/75 (22 Mar 2020 07:38) (138/61 - 179/80)  BP(mean): --  RR: 18 (22 Mar 2020 07:38) (18 - 19)  SpO2: 97% (22 Mar 2020 07:38) (96% - 98%)    General: No acute distress  HEENT: EOM intact, visual fields full  Abdomen: Soft, nontender, nondistended   Extremities: No edema    NEUROLOGICAL EXAM:  Mental status: Eyes open, awake, alert, oriented x3, fluent speech no neglect, able to follow commands  Cranial Nerves: No facial asymmetry, no nystagmus, no dysarthria.  Motor exam: Normal tone, RUE 5/5, RLE 5/5, Left side hemiparesis LUE drift, antigravity, LLE antigravity  Sensation: Intact to light touch   Coordination/ Gait: No dysmetria.    LABS:                        13.0   7.97  )-----------( 281      ( 20 Mar 2020 22:31 )             39.0    03-20    137  |  103  |  11  ----------------------------<  89  4.5   |  20<L>  |  0.46<L>    Ca    9.0      20 Mar 2020 20:32    TPro  6.3  /  Alb  3.7  /  TBili  0.2  /  DBili  x   /  AST  30  /  ALT  20  /  AlkPhos  56  03-20  PT/INR - ( 20 Mar 2020 22:31 )   PT: 12.6 sec;   INR: 1.09 ratio         PTT - ( 20 Mar 2020 22:31 )  PTT:29.3 sec      IMAGING: Reviewed by me.     Brain MRI (03/21/20): Multiple foci of diffusion restriction in the right medial temporal lobe, right occipital cortex, right posterior limb of internal capsule and left centrum semiovale and possible midportion of the medulla which may be related to cardioembolic source versus hypercoagulability. Moderate atrophy and small vessel white matter ischemic changes.    Head CT CTA Head/Neck 03/21/20: CT HEAD: No acute intracranial bleeding, mass effect, or shift.  CTA BRAIN: Atheromatous irregularity along the carotid siphons bilaterally. Otherwise, no flow-limiting stenosis or occlusion.     CTA NECK: Atheromatous calcification along the carotid bulbs bilaterally without significant stenosis. No flow limiting stenosis or occlusion.

## 2020-03-22 NOTE — CONSULT NOTE ADULT - ASSESSMENT
74F w/ PMH of peripheral vertigo (intermittent for past two years) presents with complaints of unsteady gait. Patient states she woke up on Tuesday 3/17 and noticed difficulty ambulating. No room spinning sensation noted. States she was just off balance when walking. Swaying to both sides. Has not remitted since. She also notes that she felt "numb" in her LUE and LLE (now resolved). Denies changes in speech, vision, hearing, swallowing, voice quality, numbness/tingling, weakness. No previous history of CVA/TIA, seizures, migraines. No history of arrythmia. No AC.   Patient says she has balance problems at baseline, though has never had trouble ambulating unassisted. This week she was been unable to walk even a few steps. No fevers, chills, cough. (21 Mar 2020 03:43)  while on tele pt with run of tachycardia.  pt with possible cardioembolic cv  on tele pt withy short run of PAT  will add small dose of beta blocker, pt has hx of bradycardia  may consider BINH, possible loop will discuss with neurology  dvt prophylaxis  tsh

## 2020-03-22 NOTE — CONSULT NOTE ADULT - SUBJECTIVE AND OBJECTIVE BOX
CHIEF COMPLAINT:Patient is a 74y old  Female who presents with a chief complaint of Ataxia (22 Mar 2020 08:16)      HPI:  74F w/ PMH of peripheral vertigo (intermittent for past two years) presents with complaints of unsteady gait. Patient states she woke up on Tuesday 3/17 and noticed difficulty ambulating. No room spinning sensation noted. States she was just off balance when walking. Swaying to both sides. Has not remitted since. She also notes that she felt "numb" in her LUE and LLE (now resolved). Denies changes in speech, vision, hearing, swallowing, voice quality, numbness/tingling, weakness. No previous history of CVA/TIA, seizures, migraines. No history of arrythmia. No AC.   Patient says she has balance problems at baseline, though has never had trouble ambulating unassisted. This week she was been unable to walk even a few steps. No fevers, chills, cough. (21 Mar 2020 03:43)  while on tele pt with run of tachycardia.    PAST MEDICAL & SURGICAL HISTORY:  No pertinent past medical history  No significant past surgical history      MEDICATIONS  (STANDING):  aspirin enteric coated 81 milliGRAM(s) Oral daily  atorvastatin 80 milliGRAM(s) Oral at bedtime  enoxaparin Injectable 40 milliGRAM(s) SubCutaneous daily  meclizine 25 milliGRAM(s) Oral three times a day    MEDICATIONS  (PRN):      FAMILY HISTORY:      SOCIAL HISTORY:    [ ] Non-smoker  [ ] Smoker  [ ] Alcohol    Allergies    No Known Allergies    Intolerances    	    REVIEW OF SYSTEMS:  CONSTITUTIONAL: No fever, weight loss, or fatigue  EYES: No eye pain, visual disturbances, or discharge  ENT:  No difficulty hearing, tinnitus, vertigo; No sinus or throat pain  NECK: No pain or stiffness  RESPIRATORY: No cough, wheezing, chills or hemoptysis; No Shortness of Breath  CARDIOVASCULAR: No chest pain, palpitations, passing out, dizziness, or leg swelling  GASTROINTESTINAL: No abdominal or epigastric pain. No nausea, vomiting, or hematemesis; No diarrhea or constipation. No melena or hematochezia.  GENITOURINARY: No dysuria, frequency, hematuria, or incontinence  NEUROLOGICAL: No headaches, memory loss, loss of strength, numbness, or tremors  SKIN: No itching, burning, rashes, or lesions   LYMPH Nodes: No enlarged glands  ENDOCRINE: No heat or cold intolerance; No hair loss  MUSCULOSKELETAL: No joint pain or swelling; No muscle, back, or extremity pain  PSYCHIATRIC: No depression, anxiety, mood swings, or difficulty sleeping  HEME/LYMPH: No easy bruising, or bleeding gums  ALLERGY AND IMMUNOLOGIC: No hives or eczema	    [ ] All others negative	  [ ] Unable to obtain    PHYSICAL EXAM:  T(C): 36.4 (03-22-20 @ 07:38), Max: 36.9 (03-21-20 @ 23:22)  HR: 70 (03-22-20 @ 09:45) (56 - 102)  BP: 149/79 (03-22-20 @ 09:45) (138/61 - 179/80)  RR: 18 (03-22-20 @ 07:38) (18 - 19)  SpO2: 100% (03-22-20 @ 09:45) (96% - 100%)  Wt(kg): --  I&O's Summary    21 Mar 2020 07:01  -  22 Mar 2020 07:00  --------------------------------------------------------  IN: 480 mL / OUT: 0 mL / NET: 480 mL        Appearance: Normal	  HEENT:   Normal oral mucosa, PERRL, EOMI	  Lymphatic: No lymphadenopathy  Cardiovascular: Normal S1 S2, No JVD, + murmurs, No edema  Respiratory:rhonchi  Psychiatry: A & O x 3, Mood & affect appropriate  Gastrointestinal:  Soft, Non-tender, + BS	  Skin: No rashes, No ecchymoses, No cyanosis	  Neurologic: Non-focal  Extremities: Normal range of motion, No clubbing, cyanosis or edema  Vascular: Peripheral pulses palpable 2+ bilaterally    TELEMETRY: 	    ECG:  	  RADIOLOGY:  OTHER: 	  	  LABS:	 	    CARDIAC MARKERS:                              13.0   7.97  )-----------( 281      ( 20 Mar 2020 22:31 )             39.0     03-20    137  |  103  |  11  ----------------------------<  89  4.5   |  20<L>  |  0.46<L>    Ca    9.0      20 Mar 2020 20:32    TPro  6.3  /  Alb  3.7  /  TBili  0.2  /  DBili  x   /  AST  30  /  ALT  20  /  AlkPhos  56  03-20    proBNP:   Lipid Profile:   HgA1c: Hemoglobin A1C, Whole Blood: 5.9 % (03-22 @ 09:48)    TSH:   PT/INR - ( 20 Mar 2020 22:31 )   PT: 12.6 sec;   INR: 1.09 ratio         PTT - ( 20 Mar 2020 22:31 )  PTT:29.3 sec    PREVIOUS DIAGNOSTIC TESTING:    < from: 12 Lead ECG (03.20.20 @ 22:08) >  Diagnosis Line SINUS BRADYCARDIA  POSSIBLE LEFT ATRIAL ENLARGEMENT  CANNOT RULE OUT ANTERIOR INFARCT , AGE UNDETERMINED  ABNORMAL ECG    < from: Transthoracic Echocardiogram (03.22.20 @ 08:37) >  1. Mitral annular calcification and calcified mitral  leaflets with normal diastolic opening. Mild mitral  regurgitation.  2. Calcified trileaflet aortic valve with normal opening.  Mild aortic regurgitation.  3. Normal left ventricular systolic function. No segmental  wall motion abnormalities.  4. Normal diastolic function  5. Normal right ventricular size and function.  6. Color Doppler demonstrates no evidence of a patent  foramen ovale.  7. Normal pericardium with trace pericardial effusion.    < from: CT Head No Cont (03.20.20 @ 22:27) >  CT HEAD: No acute intracranial bleeding, mass effect, or shift.     CTA BRAIN: Atheromatous irregularity along the carotid siphons bilaterally. Otherwise, no flow-limiting stenosis or occlusion.     CTA NECK: Atheromatous calcificationalong the carotid bulbs bilaterally without significant stenosis. No flow limiting stenosis or occlusion.   < from: Xray Chest 2 Views PA/Lat (09.21.16 @ 19:31) >  PA and lateral radiographs of the chest were obtained without any prior   films for comparison.    Heart is normal in size.     Lungs are clear. No pleural effusions are noted.    Scoliosis of the spine as well as loss of height of few of the lower   thoracic vertebral bodies is noted.    IMPRESSION: Clear lungs.      < from: MR Head No Cont (03.21.20 @ 15:23) >  Multiple foci of diffusion restriction in the right medial temporal lobe, right occipital cortex, right posterior limb of internal capsule and left centrum semiovale and possible midportion of the medulla which may be related to cardioembolic source versus hypercoagulability. Moderate atrophy and small vessel white matter ischemic changes.    < end of copied text >

## 2020-03-23 ENCOUNTER — TRANSCRIPTION ENCOUNTER (OUTPATIENT)
Age: 75
End: 2020-03-23

## 2020-03-23 LAB
CHOLEST SERPL-MCNC: 144 MG/DL — SIGNIFICANT CHANGE UP (ref 10–199)
HDLC SERPL-MCNC: 66 MG/DL — SIGNIFICANT CHANGE UP
LIPID PNL WITH DIRECT LDL SERPL: 67 MG/DL — SIGNIFICANT CHANGE UP
TOTAL CHOLESTEROL/HDL RATIO MEASUREMENT: 2.2 RATIO — LOW (ref 3.3–7.1)
TRIGL SERPL-MCNC: 52 MG/DL — SIGNIFICANT CHANGE UP (ref 10–149)

## 2020-03-23 PROCEDURE — 93312 ECHO TRANSESOPHAGEAL: CPT | Mod: 26

## 2020-03-23 PROCEDURE — 93320 DOPPLER ECHO COMPLETE: CPT | Mod: 26

## 2020-03-23 PROCEDURE — 76376 3D RENDER W/INTRP POSTPROCES: CPT | Mod: 26

## 2020-03-23 PROCEDURE — 93325 DOPPLER ECHO COLOR FLOW MAPG: CPT | Mod: 26

## 2020-03-23 PROCEDURE — 93970 EXTREMITY STUDY: CPT | Mod: 26

## 2020-03-23 RX ORDER — ATORVASTATIN CALCIUM 80 MG/1
10 TABLET, FILM COATED ORAL AT BEDTIME
Refills: 0 | Status: DISCONTINUED | OUTPATIENT
Start: 2020-03-23 | End: 2020-03-24

## 2020-03-23 RX ORDER — LISINOPRIL 2.5 MG/1
5 TABLET ORAL DAILY
Refills: 0 | Status: DISCONTINUED | OUTPATIENT
Start: 2020-03-23 | End: 2020-03-24

## 2020-03-23 RX ADMIN — Medication 25 MILLIGRAM(S): at 13:37

## 2020-03-23 RX ADMIN — Medication 25 MILLIGRAM(S): at 21:18

## 2020-03-23 RX ADMIN — LISINOPRIL 5 MILLIGRAM(S): 2.5 TABLET ORAL at 13:38

## 2020-03-23 RX ADMIN — ATORVASTATIN CALCIUM 10 MILLIGRAM(S): 80 TABLET, FILM COATED ORAL at 21:18

## 2020-03-23 RX ADMIN — ENOXAPARIN SODIUM 40 MILLIGRAM(S): 100 INJECTION SUBCUTANEOUS at 13:38

## 2020-03-23 RX ADMIN — Medication 81 MILLIGRAM(S): at 13:37

## 2020-03-23 NOTE — DISCHARGE NOTE PROVIDER - NSDCMRMEDTOKEN_GEN_ALL_CORE_FT
meclizine 25 mg oral tablet: 1 tab(s) orally 3 times a day aspirin 81 mg oral delayed release tablet: 1 tab(s) orally once a day  atorvastatin 10 mg oral tablet: 1 tab(s) orally once a day (at bedtime)  lisinopril 5 mg oral tablet: 1 tab(s) orally once a day  meclizine 25 mg oral tablet: 1 tab(s) orally 3 times a day   metoprolol succinate 25 mg oral tablet, extended release: 1 tab(s) orally once a day aspirin 81 mg oral delayed release tablet: 1 tab(s) orally once a day  atorvastatin 10 mg oral tablet: 1 tab(s) orally once a day (at bedtime)  clopidogrel 75 mg oral tablet: 1 tab(s) orally once a day for 3 months  lisinopril 5 mg oral tablet: 1 tab(s) orally once a day  meclizine 25 mg oral tablet: 1 tab(s) orally 3 times a day, As Needed   metoprolol succinate 25 mg oral tablet, extended release: 1 tab(s) orally once a day aspirin 81 mg oral delayed release tablet: 1 tab(s) orally once a day  atorvastatin 10 mg oral tablet: 1 tab(s) orally once a day (at bedtime)  clopidogrel 75 mg oral tablet: 1 tab(s) orally once a day for 3 months  lisinopril 5 mg oral tablet: 1 tab(s) orally once a day  meclizine 25 mg oral tablet: 1 tab(s) orally 3 times a day, As Needed  metoprolol succinate 25 mg oral tablet, extended release: 1 tab(s) orally once a day

## 2020-03-23 NOTE — DISCHARGE NOTE PROVIDER - HOSPITAL COURSE
74F w/ PMH of peripheral vertigo (intermittent for past two years) presented on 03/21 with complaints of unsteady gait. Patient stated she woke up on Tuesday 3/17 and noticed difficulty ambulating. No room spinning sensation noted. States she was just off balance when walking. Swaying to both sides. Has not remitted since. She also notes that she felt "numb" in her LUE and LLE (now resolved). Denied changes in speech, vision, hearing, swallowing, voice quality, numbness/tingling, weakness. No previous history of CVA/TIA, seizures, migraines.         Brain MRI (03/21/20): Multiple foci of diffusion restriction in the right medial temporal lobe, right occipital cortex, right posterior limb of internal capsule and left centrum semiovale and possible midportion of the medulla which may be related to cardioembolic source versus hypercoagulability. Moderate atrophy and small vessel white matter ischemic changes.         03/21/20:     CT HEAD: No acute intracranial bleeding, mass effect, or shift.      CTA BRAIN: Atheromatous irregularity along the carotid siphons bilaterally. Otherwise, no flow-limiting stenosis or occlusion.     CTA NECK: Atheromatous calcification along the carotid bulbs bilaterally without significant stenosis. No flow limiting stenosis or occlusion.         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 Embolic stroke of undetermined source.         TTE/BINH:     Conclusions:    1. Severe irregular atheroma noted in aortic arch ( 7mm )    /descending aorta.    2. No left atrial or left atrial appendage thrombus.    3. Normal left ventricular systolic function. EF 55%    4. Normal right ventricular size and function.    5. Agitated saline injection demonstrates evidence of a    very small  patent foramen ovale. No flow is seen with    color Doppler.        LE Doppler: No evidence of deep venous thrombosis in either lower extremity.        Patient discharged on ASA and Lipitor for stroke prevention.        Patient evaluated by PT/OT and was recommended Acute Rehab. Patient stable for discharge, 74F w/ PMH of peripheral vertigo (intermittent for past two years) presented on 03/21 with complaints of unsteady gait. Patient stated she woke up on Tuesday 3/17 and noticed difficulty ambulating. No room spinning sensation noted. States she was just off balance when walking. Swaying to both sides. Has not remitted since. She also notes that she felt "numb" in her LUE and LLE (now resolved). Denied changes in speech, vision, hearing, swallowing, voice quality, numbness/tingling, weakness. No previous history of CVA/TIA, seizures, migraines.         Brain MRI (03/21/20): Multiple foci of diffusion restriction in the right medial temporal lobe, right occipital cortex, right posterior limb of internal capsule and left centrum semiovale and possible midportion of the medulla which may be related to cardioembolic source versus hypercoagulability. Moderate atrophy and small vessel white matter ischemic changes.         03/21/20:     CT HEAD: No acute intracranial bleeding, mass effect, or shift.      CTA BRAIN: Atheromatous irregularity along the carotid siphons bilaterally. Otherwise, no flow-limiting stenosis or occlusion.     CTA NECK: Atheromatous calcification along the carotid bulbs bilaterally without significant stenosis. No flow limiting stenosis or occlusion.         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 Embolic stroke of undetermined source.         TTE/BINH:     Conclusions:    1. Severe irregular atheroma noted in aortic arch ( 7mm )    /descending aorta.    2. No left atrial or left atrial appendage thrombus.    3. Normal left ventricular systolic function. EF 55%    4. Normal right ventricular size and function.    5. Agitated saline injection demonstrates evidence of a    very small  patent foramen ovale. No flow is seen with    color Doppler.        LE Doppler: No evidence of deep venous thrombosis in either lower extremity.        Patient discharged on ASA and Lipitor for stroke prevention.        ILR placed to screen for occult arrythmia.         Patient evaluated by PT/OT and was recommended Acute Rehab. Patient stable for discharge, 74F w/ PMH of peripheral vertigo (intermittent for past two years) presented on 03/21 with complaints of unsteady gait. Patient stated she woke up on Tuesday 3/17 and noticed difficulty ambulating. No room spinning sensation noted. States she was just off balance when walking. Swaying to both sides. Has not remitted since. She also notes that she felt "numb" in her LUE and LLE (now resolved). Denied changes in speech, vision, hearing, swallowing, voice quality, numbness/tingling, weakness. No previous history of CVA/TIA, seizures, migraines.         Brain MRI (03/21/20): Multiple foci of diffusion restriction in the right medial temporal lobe, right occipital cortex, right posterior limb of internal capsule and left centrum semiovale and possible midportion of the medulla which may be related to cardioembolic source versus hypercoagulability. Moderate atrophy and small vessel white matter ischemic changes.         03/21/20:     CT HEAD: No acute intracranial bleeding, mass effect, or shift.      CTA BRAIN: Atheromatous irregularity along the carotid siphons bilaterally. Otherwise, no flow-limiting stenosis or occlusion.     CTA NECK: Atheromatous calcification along the carotid bulbs bilaterally without significant stenosis. No flow limiting stenosis or occlusion.         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 Embolic stroke of undetermined source.         TTE/BINH:     Conclusions:    1. Severe irregular atheroma noted in aortic arch ( 7mm )    /descending aorta.    2. No left atrial or left atrial appendage thrombus.    3. Normal left ventricular systolic function. EF 55%    4. Normal right ventricular size and function.    5. Agitated saline injection demonstrates evidence of a    very small  patent foramen ovale. No flow is seen with    color Doppler.        LE Doppler: No evidence of deep venous thrombosis in either lower extremity.        Patient discharged on ASA and plavix for 3 months (in setting of atheroma) and Lipitor for stroke prevention.        ILR to placed as outpatient by Dr. Abarca to screen for occult arrythmia.         Patient evaluated by PT/OT and was recommended Acute Rehab. Patient stable for discharge,

## 2020-03-23 NOTE — DISCHARGE NOTE PROVIDER - CARE PROVIDER_API CALL
Demetris Dumont (DO)  Neurology; Vascular Neurology  [address] [facility] [address]  [place] [address]  Phone: [phone]  Fax: [phone]  Follow Up Time: 1 month    Gopi Abarca (DO)  Cardiovascular Disease  [address], [address]  [place] [address]  Phone: [phone]  Fax: [phone]  Follow Up Time: 1 month

## 2020-03-23 NOTE — PROGRESS NOTE ADULT - SUBJECTIVE AND OBJECTIVE BOX
THE PATIENT WAS SEEN AND EXAMINED BY ME WITH THE HOUSESTAFF AND STROKE TEAM DURING MORNING ROUNDS.   HPI:  74F w/ PMH of peripheral vertigo (intermittent for past two years) presents with complaints of unsteady gait. Patient states she woke up on Tuesday 3/17 and noticed difficulty ambulating. No room spinning sensation noted. States she was just off balance when walking. Swaying to both sides. Has not remitted since. She also notes that she felt "numb" in her LUE and LLE (now resolved). Denies changes in speech, vision, hearing, swallowing, voice quality, numbness/tingling, weakness. No previous history of CVA/TIA, seizures, migraines.     SUBJECTIVE: No events overnight.  No new neurologic complaints.      aspirin enteric coated 81 milliGRAM(s) Oral daily  atorvastatin 80 milliGRAM(s) Oral at bedtime  enoxaparin Injectable 40 milliGRAM(s) SubCutaneous daily  meclizine 25 milliGRAM(s) Oral three times a day  metoprolol succinate ER 25 milliGRAM(s) Oral daily      PHYSICAL EXAM:   Vital Signs Last 24 Hrs  T(C): 36.4 (23 Mar 2020 08:11), Max: 36.7 (22 Mar 2020 20:09)  T(F): 97.5 (23 Mar 2020 08:11), Max: 98 (22 Mar 2020 20:09)  HR: 51 (23 Mar 2020 08:11) (50 - 89)  BP: 168/77 (23 Mar 2020 08:11) (149/79 - 168/77)  BP(mean): --  RR: 18 (23 Mar 2020 08:11) (18 - 19)  SpO2: 96% (23 Mar 2020 08:11) (96% - 100%)      General: No acute distress  HEENT: EOM intact, visual fields full  Abdomen: Soft, nontender, nondistended   Extremities: No edema    NEUROLOGICAL EXAM:  Mental status: Eyes open, awake, alert, oriented x3, fluent speech no neglect, able to follow commands  Cranial Nerves: No facial asymmetry, no nystagmus, no dysarthria.  Motor exam: moves all extremities well against gravity with slight LUE drift  Sensation: Intact to light touch   Coordination/ Gait: No dysmetria.    LABS:         Hemoglobin A1C, Whole Blood: 5.9 % (03-22 @ 09:48)      IMAGING: Reviewed by me.     Brain MRI (03/21/20): Multiple foci of diffusion restriction in the right medial temporal lobe, right occipital cortex, right posterior limb of internal capsule and left centrum semiovale and possible midportion of the medulla which may be related to cardioembolic source versus hypercoagulability. Moderate atrophy and small vessel white matter ischemic changes.     03/21/20:   CT HEAD: No acute intracranial bleeding, mass effect, or shift.    CTA BRAIN: Atheromatous irregularity along the carotid siphons bilaterally. Otherwise, no flow-limiting stenosis or occlusion.   CTA NECK: Atheromatous calcification along the carotid bulbs bilaterally without significant stenosis. No flow limiting stenosis or occlusion.

## 2020-03-23 NOTE — DISCHARGE NOTE PROVIDER - PROVIDER TOKENS
PROVIDER:[TOKEN:[[id number]],FOLLOWUP:[1 month]],PROVIDER:[TOKEN:[[id number]],FOLLOWUP:[1 month]]

## 2020-03-23 NOTE — PROGRESS NOTE ADULT - ASSESSMENT
ASSESSMENT: 75 y/o woman with PMH of peripheral vertigo (intermittent for past two years) presented with acute onset gait disturbance, RUE drift, ataxia (truncal > limb), wide based gait. Brain MRI shows multiple foci of diffusion restriction in the right medial temporal lobe, right occipital cortex, right posterior limb of internal capsule and left centrum semiovale and possible midportion of the medulla. CTA Head/Neck shows no stenosis    Impression: Multiple Vascular territory infarction - right medial temporal lobe, right occipital cortex, right posterior limb of internal capsule and left centrum semiovale and possible midportion of the medulla. Embolic stroke of undetermined source.     NEURO: neurologically without acute change, Continue close monitoring for neurologic deterioration, permissive HTN with slow titration to normotension, titrate statin to LDL goal less than 70, MRI Brain w/o, CTA Head and Neck results as above. Physical therapy/OT eval with recommendations for AR, continue current rehab /treatment    ANTITHROMBOTIC THERAPY: ASA for secondary stroke prevention    PULMONARY: Protecting airway, saturating well     CARDIOVASCULAR:  TTE: ef 59%, mitral annular calcification, calcified mitral leaflets, mild MR, mild AR  , cardiac monitoring short run of PAT on 03/22/202,  cardiology consult appreciated: small dose beta blocker, hx of bradycardia, consider BINH, possible loop pending findings to screen for occult arrhythmia                   SBP goal: 120-140mmHg    GASTROINTESTINAL:  dysphagia screen passed, tolerating diet     Diet: Regular    RENAL: BUN/Cr within range, maintain adequate hydration, good urine output      Na Goal: Greater than 135     Osborne: N    HEMATOLOGY: H/H without anemia noted, no active bleeding, Platelets 281, she should have all age and risk appropriate screenings for malignancy      DVT ppx:  LMWH      ID: afebrile, no leukocytosis , no si/sx of infection     OTHER: Plan/goals of care discussed with pt at bedside and with pt's son over the phone per team prior.     DISPOSITION: AR      CORE MEASURES:        Admission NIHSS:      TPA:  NO      LDL/HDL:      Depression Screen: 0     Statin Therapy: Y     Dysphagia Screen:  PASS      Smoking  NO      Afib  NO     Stroke Education YES    Obtain screening ultrasound in patients who meet 1 or more of the following criteria as patient is high risk for DVT/PE upon admission:   [] History of DVT/PE  []Hypercoagulable states (Factor V Leiden, Cancer, OCP, etc. )  []Prolonged immobility (hemiplegia/hemiparesis/post operative or any other extended immobilization)   [] Transferred from outside facility (Rehab or Long term care)  [] Age </= to 50 ASSESSMENT: 75 y/o woman with PMH of peripheral vertigo (intermittent for past two years) presented with acute onset gait disturbance, RUE drift, ataxia (truncal > limb), wide based gait. Brain MRI shows multiple foci of diffusion restriction in the right medial temporal lobe, right occipital cortex, right posterior limb of internal capsule and left centrum semiovale and possible midportion of the medulla. CTA Head/Neck shows no stenosis    Impression: Multiple Vascular territory infarction - right medial temporal lobe, right occipital cortex, right posterior limb of internal capsule and left centrum semiovale and possible midportion of the medulla. Embolic stroke of undetermined source.     NEURO: neurologically without acute change, Continue close monitoring for neurologic deterioration, permissive HTN with slow titration to normotension, titrate statin to LDL goal less than 70, MRI Brain w/o, CTA Head and Neck results as above. Physical therapy/OT eval with recommendations for AR, continue current rehab /treatment    ANTITHROMBOTIC THERAPY: ASA for secondary stroke prevention    PULMONARY: Protecting airway, saturating well     CARDIOVASCULAR:  TTE: ef 59%, mitral annular calcification, calcified mitral leaflets, mild MR, mild AR  , cardiac monitoring short run of PAT on 03/22/202,  cardiology consult appreciated: small dose beta blocker, hx of bradycardia, consider BIHN, possible loop pending findings to screen for occult arrhythmia                   SBP goal: 120-140mmHg    GASTROINTESTINAL:  dysphagia screen passed, tolerating diet     Diet: Regular    RENAL: BUN/Cr within range, maintain adequate hydration, good urine output      Na Goal: Greater than 135     Osborne: N    HEMATOLOGY: H/H without anemia noted, no active bleeding, Platelets 281, she should have all age and risk appropriate screenings for malignancy      DVT ppx:  LMWH      ID: afebrile, no leukocytosis , no si/sx of infection     OTHER: Plan/goals of care discussed with pt at bedside and with pt's son over the phone per team prior.     DISPOSITION: AR      CORE MEASURES:        Admission NIHSS:      TPA:  NO      LDL/HDL:      Depression Screen: 0     Statin Therapy: Y     Dysphagia Screen:  PASS      Smoking  Yes- education and cessation counselling provided, nicotine patch deferred by patient.     Afib  NO     Stroke Education YES    Obtain screening ultrasound in patients who meet 1 or more of the following criteria as patient is high risk for DVT/PE upon admission:   [] History of DVT/PE  []Hypercoagulable states (Factor V Leiden, Cancer, OCP, etc. )  []Prolonged immobility (hemiplegia/hemiparesis/post operative or any other extended immobilization)   [] Transferred from outside facility (Rehab or Long term care)  [] Age </= to 50

## 2020-03-24 ENCOUNTER — INPATIENT (INPATIENT)
Facility: HOSPITAL | Age: 75
LOS: 6 days | Discharge: HOME HEALTH | DRG: 949 | End: 2020-03-31
Attending: PSYCHIATRY & NEUROLOGY | Admitting: PSYCHIATRY & NEUROLOGY
Payer: MEDICARE

## 2020-03-24 ENCOUNTER — TRANSCRIPTION ENCOUNTER (OUTPATIENT)
Age: 75
End: 2020-03-24

## 2020-03-24 VITALS
OXYGEN SATURATION: 98 % | TEMPERATURE: 97 F | DIASTOLIC BLOOD PRESSURE: 63 MMHG | HEART RATE: 52 BPM | SYSTOLIC BLOOD PRESSURE: 127 MMHG | RESPIRATION RATE: 18 BRPM

## 2020-03-24 VITALS
WEIGHT: 117.73 LBS | SYSTOLIC BLOOD PRESSURE: 121 MMHG | HEART RATE: 53 BPM | DIASTOLIC BLOOD PRESSURE: 67 MMHG | OXYGEN SATURATION: 97 % | RESPIRATION RATE: 14 BRPM | TEMPERATURE: 98 F | HEIGHT: 63 IN

## 2020-03-24 DIAGNOSIS — I63.9 CEREBRAL INFARCTION, UNSPECIFIED: ICD-10-CM

## 2020-03-24 LAB
ANION GAP SERPL CALC-SCNC: 10 MMOL/L — SIGNIFICANT CHANGE UP (ref 5–17)
BUN SERPL-MCNC: 14 MG/DL — SIGNIFICANT CHANGE UP (ref 7–23)
CALCIUM SERPL-MCNC: 9 MG/DL — SIGNIFICANT CHANGE UP (ref 8.4–10.5)
CHLORIDE SERPL-SCNC: 102 MMOL/L — SIGNIFICANT CHANGE UP (ref 96–108)
CO2 SERPL-SCNC: 23 MMOL/L — SIGNIFICANT CHANGE UP (ref 22–31)
CREAT SERPL-MCNC: 0.53 MG/DL — SIGNIFICANT CHANGE UP (ref 0.5–1.3)
GLUCOSE SERPL-MCNC: 98 MG/DL — SIGNIFICANT CHANGE UP (ref 70–99)
HCT VFR BLD CALC: 39 % — SIGNIFICANT CHANGE UP (ref 34.5–45)
HGB BLD-MCNC: 13.3 G/DL — SIGNIFICANT CHANGE UP (ref 11.5–15.5)
MCHC RBC-ENTMCNC: 31.9 PG — SIGNIFICANT CHANGE UP (ref 27–34)
MCHC RBC-ENTMCNC: 34.1 GM/DL — SIGNIFICANT CHANGE UP (ref 32–36)
MCV RBC AUTO: 93.5 FL — SIGNIFICANT CHANGE UP (ref 80–100)
NRBC # BLD: 0 /100 WBCS — SIGNIFICANT CHANGE UP (ref 0–0)
PLATELET # BLD AUTO: 267 K/UL — SIGNIFICANT CHANGE UP (ref 150–400)
POTASSIUM SERPL-MCNC: 3.8 MMOL/L — SIGNIFICANT CHANGE UP (ref 3.5–5.3)
POTASSIUM SERPL-SCNC: 3.8 MMOL/L — SIGNIFICANT CHANGE UP (ref 3.5–5.3)
RBC # BLD: 4.17 M/UL — SIGNIFICANT CHANGE UP (ref 3.8–5.2)
RBC # FLD: 12.5 % — SIGNIFICANT CHANGE UP (ref 10.3–14.5)
SODIUM SERPL-SCNC: 135 MMOL/L — SIGNIFICANT CHANGE UP (ref 135–145)
WBC # BLD: 6.55 K/UL — SIGNIFICANT CHANGE UP (ref 3.8–10.5)
WBC # FLD AUTO: 6.55 K/UL — SIGNIFICANT CHANGE UP (ref 3.8–10.5)

## 2020-03-24 PROCEDURE — 99223 1ST HOSP IP/OBS HIGH 75: CPT

## 2020-03-24 PROCEDURE — 97116 GAIT TRAINING THERAPY: CPT

## 2020-03-24 PROCEDURE — 70450 CT HEAD/BRAIN W/O DYE: CPT

## 2020-03-24 PROCEDURE — 70551 MRI BRAIN STEM W/O DYE: CPT

## 2020-03-24 PROCEDURE — 97162 PT EVAL MOD COMPLEX 30 MIN: CPT

## 2020-03-24 PROCEDURE — 97165 OT EVAL LOW COMPLEX 30 MIN: CPT

## 2020-03-24 PROCEDURE — 99285 EMERGENCY DEPT VISIT HI MDM: CPT

## 2020-03-24 PROCEDURE — 97129 THER IVNTJ 1ST 15 MIN: CPT

## 2020-03-24 PROCEDURE — 85610 PROTHROMBIN TIME: CPT

## 2020-03-24 PROCEDURE — 86803 HEPATITIS C AB TEST: CPT

## 2020-03-24 PROCEDURE — 99222 1ST HOSP IP/OBS MODERATE 55: CPT

## 2020-03-24 PROCEDURE — 82962 GLUCOSE BLOOD TEST: CPT

## 2020-03-24 PROCEDURE — 85027 COMPLETE CBC AUTOMATED: CPT

## 2020-03-24 PROCEDURE — 93325 DOPPLER ECHO COLOR FLOW MAPG: CPT

## 2020-03-24 PROCEDURE — 84443 ASSAY THYROID STIM HORMONE: CPT

## 2020-03-24 PROCEDURE — 93005 ELECTROCARDIOGRAM TRACING: CPT

## 2020-03-24 PROCEDURE — 93970 EXTREMITY STUDY: CPT

## 2020-03-24 PROCEDURE — 97530 THERAPEUTIC ACTIVITIES: CPT

## 2020-03-24 PROCEDURE — 80053 COMPREHEN METABOLIC PANEL: CPT

## 2020-03-24 PROCEDURE — 76376 3D RENDER W/INTRP POSTPROCES: CPT

## 2020-03-24 PROCEDURE — 80048 BASIC METABOLIC PNL TOTAL CA: CPT

## 2020-03-24 PROCEDURE — 70496 CT ANGIOGRAPHY HEAD: CPT

## 2020-03-24 PROCEDURE — 93306 TTE W/DOPPLER COMPLETE: CPT

## 2020-03-24 PROCEDURE — 80061 LIPID PANEL: CPT

## 2020-03-24 PROCEDURE — 85730 THROMBOPLASTIN TIME PARTIAL: CPT

## 2020-03-24 PROCEDURE — 93312 ECHO TRANSESOPHAGEAL: CPT

## 2020-03-24 PROCEDURE — 83036 HEMOGLOBIN GLYCOSYLATED A1C: CPT

## 2020-03-24 PROCEDURE — 97130 THER IVNTJ EA ADDL 15 MIN: CPT

## 2020-03-24 PROCEDURE — 97110 THERAPEUTIC EXERCISES: CPT

## 2020-03-24 PROCEDURE — 70498 CT ANGIOGRAPHY NECK: CPT

## 2020-03-24 PROCEDURE — 93320 DOPPLER ECHO COMPLETE: CPT

## 2020-03-24 RX ORDER — ASPIRIN/CALCIUM CARB/MAGNESIUM 324 MG
81 TABLET ORAL DAILY
Refills: 0 | Status: DISCONTINUED | OUTPATIENT
Start: 2020-03-25 | End: 2020-03-31

## 2020-03-24 RX ORDER — ATORVASTATIN CALCIUM 80 MG/1
10 TABLET, FILM COATED ORAL AT BEDTIME
Refills: 0 | Status: DISCONTINUED | OUTPATIENT
Start: 2020-03-24 | End: 2020-03-31

## 2020-03-24 RX ORDER — ASPIRIN/CALCIUM CARB/MAGNESIUM 324 MG
1 TABLET ORAL
Qty: 0 | Refills: 0 | DISCHARGE
Start: 2020-03-24

## 2020-03-24 RX ORDER — CLOPIDOGREL BISULFATE 75 MG/1
75 TABLET, FILM COATED ORAL DAILY
Refills: 0 | Status: DISCONTINUED | OUTPATIENT
Start: 2020-03-25 | End: 2020-03-31

## 2020-03-24 RX ORDER — METOPROLOL TARTRATE 50 MG
1 TABLET ORAL
Qty: 0 | Refills: 0 | DISCHARGE
Start: 2020-03-24

## 2020-03-24 RX ORDER — CLOPIDOGREL BISULFATE 75 MG/1
75 TABLET, FILM COATED ORAL DAILY
Refills: 0 | Status: DISCONTINUED | OUTPATIENT
Start: 2020-03-24 | End: 2020-03-24

## 2020-03-24 RX ORDER — ENOXAPARIN SODIUM 100 MG/ML
40 INJECTION SUBCUTANEOUS DAILY
Refills: 0 | Status: DISCONTINUED | OUTPATIENT
Start: 2020-03-25 | End: 2020-03-31

## 2020-03-24 RX ORDER — METOPROLOL TARTRATE 50 MG
25 TABLET ORAL DAILY
Refills: 0 | Status: DISCONTINUED | OUTPATIENT
Start: 2020-03-25 | End: 2020-03-24

## 2020-03-24 RX ORDER — ATORVASTATIN CALCIUM 80 MG/1
1 TABLET, FILM COATED ORAL
Qty: 0 | Refills: 0 | DISCHARGE
Start: 2020-03-24

## 2020-03-24 RX ORDER — LISINOPRIL 2.5 MG/1
5 TABLET ORAL DAILY
Refills: 0 | Status: DISCONTINUED | OUTPATIENT
Start: 2020-03-25 | End: 2020-03-31

## 2020-03-24 RX ORDER — LISINOPRIL 2.5 MG/1
1 TABLET ORAL
Qty: 0 | Refills: 0 | DISCHARGE
Start: 2020-03-24

## 2020-03-24 RX ORDER — METOPROLOL TARTRATE 50 MG
25 TABLET ORAL DAILY
Refills: 0 | Status: DISCONTINUED | OUTPATIENT
Start: 2020-03-24 | End: 2020-03-25

## 2020-03-24 RX ORDER — FAMOTIDINE 10 MG/ML
20 INJECTION INTRAVENOUS DAILY
Refills: 0 | Status: DISCONTINUED | OUTPATIENT
Start: 2020-03-24 | End: 2020-03-26

## 2020-03-24 RX ORDER — MECLIZINE HCL 12.5 MG
1 TABLET ORAL
Qty: 15 | Refills: 0
Start: 2020-03-24 | End: 2020-03-28

## 2020-03-24 RX ORDER — CLOPIDOGREL BISULFATE 75 MG/1
1 TABLET, FILM COATED ORAL
Qty: 0 | Refills: 0 | DISCHARGE
Start: 2020-03-24

## 2020-03-24 RX ADMIN — Medication 25 MILLIGRAM(S): at 05:21

## 2020-03-24 RX ADMIN — LISINOPRIL 5 MILLIGRAM(S): 2.5 TABLET ORAL at 05:21

## 2020-03-24 RX ADMIN — Medication 25 MILLIGRAM(S): at 12:03

## 2020-03-24 RX ADMIN — ATORVASTATIN CALCIUM 10 MILLIGRAM(S): 80 TABLET, FILM COATED ORAL at 22:10

## 2020-03-24 RX ADMIN — Medication 81 MILLIGRAM(S): at 12:03

## 2020-03-24 RX ADMIN — ENOXAPARIN SODIUM 40 MILLIGRAM(S): 100 INJECTION SUBCUTANEOUS at 12:03

## 2020-03-24 RX ADMIN — CLOPIDOGREL BISULFATE 75 MILLIGRAM(S): 75 TABLET, FILM COATED ORAL at 12:03

## 2020-03-24 NOTE — H&P ADULT - NSHPSOCIALHISTORY_GEN_ALL_CORE
Lives with Granddaughter in home with [address] and on one level- however Granddaughter only lives with her during school year and with cancellation is moving back to [place] w family.  PTA Independent

## 2020-03-24 NOTE — PROGRESS NOTE ADULT - ASSESSMENT
Assessment and plan  ---------------------------  74F w/ PMH of peripheral vertigo (intermittent for past two years) presents with complaints of unsteady gait. Patient states she woke up on Tuesday 3/17 and noticed difficulty ambulating. No room spinning sensation noted. States she was just off balance when walking. Swaying to both sides. Has not remitted since. She also notes that she felt "numb" in her LUE and LLE (now resolved). Denies changes in speech, vision, hearing, swallowing, voice quality, numbness/tingling, weakness. No previous history of CVA/TIA, seizures, migraines. No history of arrythmia. No AC.   Patient says she has balance problems at baseline, though has never had trouble ambulating unassisted. This week she was been unable to walk even a few steps. No fevers, chills, cough. (21 Mar 2020 03:43)  while on tele pt with run of tachycardia.PAT  pt with possible cardioembolic cv  on tele pt withy short run of PAT  will add small dose of beta blocker, pt has hx of bradycardia  dvt prophylaxis  tsh noted  BINH noted +irregular atheroma severe ? cause of  cva,   continue asa/plavix  Lipitor 80 mg qhs  plan for possible loop as out pt

## 2020-03-24 NOTE — PROGRESS NOTE ADULT - ASSESSMENT
ASSESSMENT: 73 y/o woman with PMH of peripheral vertigo (intermittent for past two years) presented with acute onset gait disturbance, RUE drift, ataxia (truncal > limb), wide based gait. Brain MRI shows multiple foci of diffusion restriction in the right medial temporal lobe, right occipital cortex, right posterior limb of internal capsule and left centrum semiovale and possible midportion of the medulla. CTA Head/Neck shows no stenosis    Impression: Multiple Vascular territory infarction - right medial temporal lobe, right occipital cortex, right posterior limb of internal capsule and left centrum semiovale and possible midportion of the medulla. Embolic stroke of undetermined source.     NEURO: neurologically without acute change, Continue close monitoring for neurologic deterioration, permissive HTN with slow titration to normotension, LDL < 70- started on low dose statin- monitor LFT and titrate accordingly, MRI Brain w/o, CTA Head and Neck results as above. Physical therapy/OT eval with recommendations for AR, continue current rehab /treatment    ANTITHROMBOTIC THERAPY: ASA for secondary stroke prevention    PULMONARY: Protecting airway, saturating well     CARDIOVASCULAR:  TTE: ef 59%, mitral annular calcification, calcified mitral leaflets, mild MR, mild AR  , cardiac monitoring short run of PAT on 03/22/202,  cardiology consult appreciated: small dose beta blocker, hx of bradycardia,  BINH: severe irregular atheroma in aortic arch/descending aorta, ef 55%, PFO , findings d/w Dr. Abarca- pending review will anticipate ILR to screen for occult arrhythmia as source.     SBP goal: 120-140mmHg    GASTROINTESTINAL:  dysphagia screen passed, tolerating diet     Diet: Regular    RENAL: BUN/Cr within range, maintain adequate hydration, good urine output      Na Goal: Greater than 135     Osborne: N    HEMATOLOGY: H/H without anemia noted, no active bleeding, Platelets 267, she should have all age and risk appropriate screenings for malignancy - history of smoking etc.     DVT ppx:  LMWH      ID: afebrile, no leukocytosis , no si/sx of infection     OTHER: Plan/goals of care discussed with pt at bedside. Questions and concerns addressed.     DISPOSITION: AR      CORE MEASURES:        Admission NIHSS:      TPA:  NO      LDL/HDL: 67/66     Depression Screen: 0     Statin Therapy: Y     Dysphagia Screen:  PASS      Smoking  Yes- education and cessation counselling provided, nicotine patch deferred by patient.     Afib  NO     Stroke Education YES    Obtain screening ultrasound in patients who meet 1 or more of the following criteria as patient is high risk for DVT/PE upon admission:   [] History of DVT/PE  []Hypercoagulable states (Factor V Leiden, Cancer, OCP, etc. )  []Prolonged immobility (hemiplegia/hemiparesis/post operative or any other extended immobilization)   [] Transferred from outside facility (Rehab or Long term care)  [] Age </= to 50

## 2020-03-24 NOTE — H&P ADULT - NSHPLABSRESULTS_GEN_ALL_CORE
EXAM:  MR BRAIN                            PROCEDURE DATE:  03/21/2020            INTERPRETATION:  CLINICAL INFORMATION: Gait instability, dizzy    TECHNIQUE: MRI of the brain was performed without contrast. Sagittal T1 FLAIR and T2 GRE, and axial T2, T2 FLAIR, SPGR, SWI, diffusion-weighted images and an ADC map were obtained.    COMPARISON: CT head and CTA head and neck from 3/20/2020    FINDINGS:    Moderate prominence of the sulci and ventricles are consistent with age-appropriate volume loss.     Diffusion-weighted imaging demonstrates foci of diffusion restriction in the right medial temporal lobe, the right posterior limb of the internal capsule, the right occipital cortex and the left centrum semiovale. A questionable focus is identified in the mid portion of the medulla. Multiplicity of infarcts may be due to hypercoagulable state or emboli. There is additional moderate nonacute small vessel white matter ischemic change noted.    There is no intraparenchymal hematoma, mass effect or midline shift.     No abnormal extra-axial fluid collections are present. Major flow-voids at the base of the brain follow expected course and contour.    The calvarium is intact. The visualized intraorbital compartments, paranasal sinuses and tympanomastoid cavities appear free of acute disease.    IMPRESSION:    Multiple foci of diffusion restriction in the right medial temporal lobe, right occipital cortex, right posterior limb of internal capsule and left centrum semiovale and possible midportion of the medulla which may be related to cardioembolic source versus hypercoagulability. Moderate atrophy and small vessel white matter ischemic changes.                CONCHA MOLINA M.D., RADIOLOGY RESIDENT  This document has been electronically signed.  RAMONA ECHEVARRIA M.D., ATTENDING RADIOLOGIST  This document has been electronically signed. Mar 21 2020  3:59PM

## 2020-03-24 NOTE — H&P ADULT - NSHPPHYSICALEXAM_GEN_ALL_CORE
CURRENT FUNCTIONAL STATUS:  CG with transfers and gait.     PHYSICAL EXAM  Constitutional - NAD, Comfortable  HEENT - NCAT, EOMI  Neck - Supple, No limited ROM  Chest - CTA bilaterally, No wheeze, No rhonchi, No crackles  Cardiovascular - RRR, S1S2, No murmurs  Abdomen - BS+, Soft, NTND  Extremities - No C/C/E, No calf tenderness   Skin-no rash  Woumds-      Neurologic Exam -                   HIF  - Awake, Alert, AAO to self, place, date, year, situation      No aphasia, normal attention, normal concentration, no apraxia, agnosia     Cranial Nerves - CN 2-12 intact     Motor - No focal deficits                            Sensory - Intact to LT,PP,Temprature,JPS, vibration                      Cortical sensory modalities intact     Reflexes - DTR Intact     Toes are flexor     Coordination/dysmetria - FTN intact             Balance - WNL Static and dynamic     Psychiatric - Mood stable, Affect WNL CURRENT FUNCTIONAL STATUS:  CG with transfers and gait.     PHYSICAL EXAM  Constitutional - NAD, Comfortable  HEENT - NCAT, EOMI  Neck - Supple, No limited ROM  Chest - CTA bilaterally,  Cardiovascular - RRR, S1S2,   Abdomen - BS+, Soft, NTND  Extremities - No C/C/E, No calf tenderness   Skin-no rash    Neurologic Exam -                   HIF  - Awake, Alert, AAO to self, place, date, year, situation      No aphasia, normal attention, normal concentration, no apraxia, agnosia     Cranial Nerves - CN 2-12 intact- VFF, EOMI, no nystagmus, no dysarthria, dysphagia      Motor - mild left hemiparesis 4+/5 with pronator drift and decreased FFM                            Sensory - Intact to LT,PP,     Reflexes - DTR L>R     Toes are flexor     Coordination/dysmetria - FTN intact             Balance - decreased      Psychiatric - Mood stable, Affect WNL

## 2020-03-24 NOTE — PROGRESS NOTE ADULT - SUBJECTIVE AND OBJECTIVE BOX
THE PATIENT WAS SEEN AND EXAMINED BY ME WITH THE HOUSESTAFF AND STROKE TEAM DURING MORNING ROUNDS.   HPI:  74F w/ PMH of peripheral vertigo (intermittent for past two years) presents with complaints of unsteady gait. Patient states she woke up on Tuesday 3/17 and noticed difficulty ambulating. No room spinning sensation noted. States she was just off balance when walking. Swaying to both sides. Has not remitted since. She also notes that she felt "numb" in her LUE and LLE (now resolved). Denies changes in speech, vision, hearing, swallowing, voice quality, numbness/tingling, weakness. No previous history of CVA/TIA, seizures, migraines.     SUBJECTIVE: No events overnight.  No new neurologic complaints.      aspirin enteric coated 81 milliGRAM(s) Oral daily  atorvastatin 10 milliGRAM(s) Oral at bedtime  enoxaparin Injectable 40 milliGRAM(s) SubCutaneous daily  lisinopril 5 milliGRAM(s) Oral daily  meclizine 25 milliGRAM(s) Oral three times a day  metoprolol succinate ER 25 milliGRAM(s) Oral daily      PHYSICAL EXAM:   Vital Signs Last 24 Hrs  T(C): 36.3 (24 Mar 2020 08:17), Max: 36.7 (24 Mar 2020 04:46)  T(F): 97.3 (24 Mar 2020 08:17), Max: 98 (24 Mar 2020 04:46)  HR: 51 (24 Mar 2020 08:17) (50 - 64)  BP: 148/66 (24 Mar 2020 08:17) (144/71 - 166/87)  BP(mean): --  RR: 18 (24 Mar 2020 08:17) (18 - 19)  SpO2: 98% (24 Mar 2020 08:17) (95% - 98%)    General: No acute distress  HEENT: EOM intact, visual fields full  Abdomen: Soft, nontender, nondistended   Extremities: No edema    NEUROLOGICAL EXAM:  Mental status: Eyes open, awake, alert, oriented x3, fluent speech no neglect, able to follow commands  Cranial Nerves: No facial asymmetry, no nystagmus, no dysarthria.  Motor exam: moves all extremities well against gravity with slight LUE drift  Sensation: Intact to light touch   Coordination/ Gait: No dysmetria.    LABS:                        13.3   6.55  )-----------( 267      ( 24 Mar 2020 06:11 )             39.0    03-24    135  |  102  |  14  ----------------------------<  98  3.8   |  23  |  0.53    Ca    9.0      24 Mar 2020 06:11      Hemoglobin A1C, Whole Blood: 5.9 % (03-22 @ 09:48)      IMAGING: Reviewed by me.   Brain MRI (03/21/20): Multiple foci of diffusion restriction in the right medial temporal lobe, right occipital cortex, right posterior limb of internal capsule and left centrum semiovale and possible midportion of the medulla which may be related to cardioembolic source versus hypercoagulability. Moderate atrophy and small vessel white matter ischemic changes.     03/21/20:   CT HEAD: No acute intracranial bleeding, mass effect, or shift.    CTA BRAIN: Atheromatous irregularity along the carotid siphons bilaterally. Otherwise, no flow-limiting stenosis or occlusion.   CTA NECK: Atheromatous calcification along the carotid bulbs bilaterally without significant stenosis. No flow limiting stenosis or occlusion.

## 2020-03-24 NOTE — H&P ADULT - ATTENDING COMMENTS
73 yo RH WF with multiple vascular risk factors, large aortic atheroma and recent acute bilateral embolic infarcts presents with multiple functional deficits for BIU admission    Chart reviews, patient examined  Admit to acute rehabilitation unit  Admit labs  Resume all medications  DAPT and statin  Blood pressure and LDL control  Fall precautions  Medicine evaluation

## 2020-03-24 NOTE — PROGRESS NOTE ADULT - SUBJECTIVE AND OBJECTIVE BOX
CARDIOLOGY     PROGRESS  NOTE   ________________________________________________    CHIEF COMPLAINT:Patient is a 74y old  Female who presents with a chief complaint of Ataxia (24 Mar 2020 09:53)  no complain.  	  REVIEW OF SYSTEMS:  CONSTITUTIONAL: No fever, weight loss, or fatigue  EYES: No eye pain, visual disturbances, or discharge  ENT:  No difficulty hearing, tinnitus, vertigo; No sinus or throat pain  NECK: No pain or stiffness  RESPIRATORY: No cough, wheezing, chills or hemoptysis; No Shortness of Breath  CARDIOVASCULAR: No chest pain, palpitations, passing out, dizziness, or leg swelling  GASTROINTESTINAL: No abdominal or epigastric pain. No nausea, vomiting, or hematemesis; No diarrhea or constipation. No melena or hematochezia.  GENITOURINARY: No dysuria, frequency, hematuria, or incontinence  NEUROLOGICAL: No headaches, memory loss, loss of strength, numbness, or tremors  SKIN: No itching, burning, rashes, or lesions   LYMPH Nodes: No enlarged glands  ENDOCRINE: No heat or cold intolerance; No hair loss  MUSCULOSKELETAL: No joint pain or swelling; No muscle, back, or extremity pain  PSYCHIATRIC: No depression, anxiety, mood swings, or difficulty sleeping  HEME/LYMPH: No easy bruising, or bleeding gums  ALLERGY AND IMMUNOLOGIC: No hives or eczema	    [ ] All others negative	  [ ] Unable to obtain    PHYSICAL EXAM:  T(C): 36.3 (03-24-20 @ 08:17), Max: 36.7 (03-24-20 @ 04:46)  HR: 51 (03-24-20 @ 08:17) (50 - 64)  BP: 148/66 (03-24-20 @ 08:17) (144/71 - 166/87)  RR: 18 (03-24-20 @ 08:17) (18 - 19)  SpO2: 98% (03-24-20 @ 08:17) (95% - 98%)  Wt(kg): --  I&O's Summary    23 Mar 2020 07:01  -  24 Mar 2020 07:00  --------------------------------------------------------  IN: 320 mL / OUT: 600 mL / NET: -280 mL        Appearance: Normal	  HEENT:   Normal oral mucosa, PERRL, EOMI	  Lymphatic: No lymphadenopathy  Cardiovascular: Normal S1 S2, No JVD, + murmurs, No edema  Respiratory: Lungs clear to auscultation	  Psychiatry: A & O x 3, Mood & affect appropriate  Gastrointestinal:  Soft, Non-tender, + BS	  Skin: No rashes, No ecchymoses, No cyanosis	  Neurologic: Non-focal  Extremities: Normal range of motion, No clubbing, cyanosis or edema  Vascular: Peripheral pulses palpable 2+ bilaterally    MEDICATIONS  (STANDING):  aspirin enteric coated 81 milliGRAM(s) Oral daily  atorvastatin 10 milliGRAM(s) Oral at bedtime  clopidogrel Tablet 75 milliGRAM(s) Oral daily  enoxaparin Injectable 40 milliGRAM(s) SubCutaneous daily  lisinopril 5 milliGRAM(s) Oral daily  meclizine 25 milliGRAM(s) Oral three times a day  metoprolol succinate ER 25 milliGRAM(s) Oral daily      TELEMETRY: 	    ECG:  	  RADIOLOGY:  OTHER: 	  	  LABS:	 	    CARDIAC MARKERS:                                13.3   6.55  )-----------( 267      ( 24 Mar 2020 06:11 )             39.0     03-24    135  |  102  |  14  ----------------------------<  98  3.8   |  23  |  0.53    Ca    9.0      24 Mar 2020 06:11      proBNP:   Lipid Profile: Cholesterol 144  LDL 67  HDL 66  TG 52    HgA1c: Hemoglobin A1C, Whole Blood: 5.9 % (03-22 @ 09:48)    TSH: Thyroid Stimulating Hormone, Serum: 0.98 uIU/mL (03-22 @ 17:24)      < from: BINH w/o TTE (w/3D Echo) (03.23.20 @ 12:58) >  1. Severe irregular atheroma noted in aortic arch ( 7mm )  /descending aorta.  2. No left atrial or left atrial appendage thrombus.  3. Normal left ventricular systolic function. EF 55%  4. Normal right ventricular size and function.  5. Agitated saline injection demonstrates evidence of a  very smallpatent foramen ovale. No flow is seen with  color Doppler.

## 2020-03-24 NOTE — CONSULT NOTE ADULT - SUBJECTIVE AND OBJECTIVE BOX
Patient is a 74y old  Female who presents with a chief complaint of Ataxia (24 Mar 2020 08:53)    Admission HPI:  74F w/ PMH of peripheral vertigo (intermittent for past two years) presents with complaints of unsteady gait.   Patient states she woke up on Tuesday 3/17 and noticed difficulty ambulating. No room spinning sensation noted. States she was just off balance when walking. Swaying to both sides. Has not remitted since. She also notes that she felt "numb" in her LUE and LLE (now resolved). Denies changes in speech, vision, hearing, swallowing, voice quality, numbness/tingling, weakness. No previous history of CVA/TIA, seizures, migraines. No history of arrythmia. No AC.     Patient says she has balance problems at baseline, though has never had trouble ambulating unassisted. This week she was been unable to walk even a few steps. No fevers, chills, cough. (21 Mar 2020 03:43)    Interval History:  CTH was negative.  MRI noted multiple vascular territory CVAs on the right. As per neuro cardioembolic in nature.     REVIEW OF SYSTEMS: + poor balance, No chest pain, shortness of breath, nausea, vomiting or diarhea; other ROS neg     PAST MEDICAL & SURGICAL HISTORY  Vertigo  No significant past surgical history    FUNCTIONAL HISTORY:   Lives with Granddaughter in home with 6 GLORY and on one level  PTA Independent    CURRENT FUNCTIONAL STATUS:  CG with transfers and gait.     FAMILY HISTORY   N/C    RECENT LABS/IMAGING  CBC Full  -  ( 24 Mar 2020 06:11 )  WBC Count : 6.55 K/uL  RBC Count : 4.17 M/uL  Hemoglobin : 13.3 g/dL  Hematocrit : 39.0 %  Platelet Count - Automated : 267 K/uL  Mean Cell Volume : 93.5 fl  Mean Cell Hemoglobin : 31.9 pg  Mean Cell Hemoglobin Concentration : 34.1 gm/dL  Auto Neutrophil # : x  Auto Lymphocyte # : x  Auto Monocyte # : x  Auto Eosinophil # : x  Auto Basophil # : x  Auto Neutrophil % : x  Auto Lymphocyte % : x  Auto Monocyte % : x  Auto Eosinophil % : x  Auto Basophil % : x    03-24    135  |  102  |  14  ----------------------------<  98  3.8   |  23  |  0.53    Ca    9.0      24 Mar 2020 06:11    VITALS  T(C): 36.3 (03-24-20 @ 08:17), Max: 36.7 (03-24-20 @ 04:46)  HR: 51 (03-24-20 @ 08:17) (50 - 64)  BP: 148/66 (03-24-20 @ 08:17) (144/71 - 166/87)  RR: 18 (03-24-20 @ 08:17) (18 - 19)  SpO2: 98% (03-24-20 @ 08:17) (95% - 98%)  Wt(kg): --    ALLERGIES  No Known Allergies      MEDICATIONS   aspirin enteric coated 81 milliGRAM(s) Oral daily  atorvastatin 10 milliGRAM(s) Oral at bedtime  clopidogrel Tablet 75 milliGRAM(s) Oral daily  enoxaparin Injectable 40 milliGRAM(s) SubCutaneous daily  lisinopril 5 milliGRAM(s) Oral daily  meclizine 25 milliGRAM(s) Oral three times a day  metoprolol succinate ER 25 milliGRAM(s) Oral daily      ----------------------------------------------------------------------------------------  PHYSICAL EXAM  Constitutional - NAD, Comfortable  HEENT - NCAT, EOMI  Neck - Supple, No limited ROM  Chest - CTA bilaterally, No wheeze, No rhonchi, No crackles  Cardiovascular - Leonardo, S1S2, No murmurs  Abdomen - BS+, Soft, NTND  Extremities - No C/C/E, No calf tenderness   Neurologic Exam -                    Cognitive - Awake, Alert, AAO to self, place, date, year, situation     Communication - Fluent, No dysarthria, no aphasia     Cranial Nerves - CN 2-12 intact     Motor - No focal deficits      Sensory - Intact to LT     Reflexes - DTR Intact, No primitive reflexive       Psychiatric - Mood stable, Affect WNL    Impression:  75 yo with functional deficits secondary to diagnosis of CVA    Plan:  PT- ROM, Bed Mob, Transfers, Amb w AD.  OT- ADLs,  SLP- Dysphagia eval and treat  Prec- Falls, Cardiac  DVT Prophylaxis- Lovenox  Skin- Turn q2 h  Dispo- Patient is a 74y old  Female who presents with a chief complaint of Ataxia (24 Mar 2020 08:53)    Admission HPI:  74F w/ PMH of peripheral vertigo (intermittent for past two years) presents with complaints of unsteady gait.   Patient states she woke up on Tuesday 3/17 and noticed difficulty ambulating. No room spinning sensation noted. States she was just off balance when walking. Swaying to both sides. Has not remitted since. She also notes that she felt "numb" in her LUE and LLE (now resolved). Denies changes in speech, vision, hearing, swallowing, voice quality, numbness/tingling, weakness. No previous history of CVA/TIA, seizures, migraines. No history of arrythmia. No AC.     Patient says she has balance problems at baseline, though has never had trouble ambulating unassisted. This week she was been unable to walk even a few steps. No fevers, chills, cough. (21 Mar 2020 03:43)    Interval History:  CTH was negative.  MRI noted multiple vascular territory CVAs on the right. As per neuro cardioembolic in nature.     REVIEW OF SYSTEMS: + poor balance, No chest pain, shortness of breath, nausea, vomiting or diarhea; other ROS neg     PAST MEDICAL & SURGICAL HISTORY  Vertigo  No significant past surgical history    FUNCTIONAL HISTORY:   Lives with Granddaughter in home with 6 GLORY and on one level- however Granddaughter only lives with her during school year and with cancellation is moving back to [facility] family.  PTA Independent    CURRENT FUNCTIONAL STATUS:  CG with transfers and gait.     FAMILY HISTORY   N/C    RECENT LABS/IMAGING  CBC Full  -  ( 24 Mar 2020 06:11 )  WBC Count : 6.55 K/uL  RBC Count : 4.17 M/uL  Hemoglobin : 13.3 g/dL  Hematocrit : 39.0 %  Platelet Count - Automated : 267 K/uL  Mean Cell Volume : 93.5 fl  Mean Cell Hemoglobin : 31.9 pg  Mean Cell Hemoglobin Concentration : 34.1 gm/dL  Auto Neutrophil # : x  Auto Lymphocyte # : x  Auto Monocyte # : x  Auto Eosinophil # : x  Auto Basophil # : x  Auto Neutrophil % : x  Auto Lymphocyte % : x  Auto Monocyte % : x  Auto Eosinophil % : x  Auto Basophil % : x    03-24    135  |  102  |  14  ----------------------------<  98  3.8   |  23  |  0.53    Ca    9.0      24 Mar 2020 06:11    VITALS  T(C): 36.3 (03-24-20 @ 08:17), Max: 36.7 (03-24-20 @ 04:46)  HR: 51 (03-24-20 @ 08:17) (50 - 64)  BP: 148/66 (03-24-20 @ 08:17) (144/71 - 166/87)  RR: 18 (03-24-20 @ 08:17) (18 - 19)  SpO2: 98% (03-24-20 @ 08:17) (95% - 98%)  Wt(kg): --    ALLERGIES  No Known Allergies      MEDICATIONS   aspirin enteric coated 81 milliGRAM(s) Oral daily  atorvastatin 10 milliGRAM(s) Oral at bedtime  clopidogrel Tablet 75 milliGRAM(s) Oral daily  enoxaparin Injectable 40 milliGRAM(s) SubCutaneous daily  lisinopril 5 milliGRAM(s) Oral daily  meclizine 25 milliGRAM(s) Oral three times a day  metoprolol succinate ER 25 milliGRAM(s) Oral daily      ----------------------------------------------------------------------------------------  PHYSICAL EXAM  Constitutional - NAD, Comfortable  HEENT - NCAT, EOMI  Neck - Supple, No limited ROM  Chest - CTA bilaterally, No wheeze, No rhonchi, No crackles  Cardiovascular - Leonardo, S1S2, No murmurs  Abdomen - BS+, Soft, NTND  Extremities - No C/C/E, No calf tenderness   Neurologic Exam -                    AAO x 3  Speech appropriate  Motor 4+/5 bl UE and LE  Balance fair.      Psychiatric - Mood stable, Affect WNL    Impression:  73 yo with functional deficits secondary to diagnosis of CVA    Plan:  PT- ROM, Bed Mob, Transfers, Amb w AD.  OT- ADLs,  SLP- Dysphagia eval and treat  Prec- Falls, Cardiac  DVT Prophylaxis- Lovenox  Skin- Turn q2 h  Dispo- Acute Rehab- can tolerate 3h/d PT/OT/SLP and requires daily physician visits

## 2020-03-24 NOTE — H&P ADULT - ASSESSMENT
FRANCISCO NEUMANN is a 73yo Female s/p bilateral CVAs, now with decreased functional mobility, gait instability and ADL impairments.      COMORBIDITES/ACTIVE MEDICAL ISSUES     Gait Instability, ADL impairments and Functional impairments: start Comprehensive Rehab Program of PT/OT       Pain Mgmt - Tylenol PRN  GI/Bowel Mgmt - Colace, Senna,  Miralax PRN  /Bladder Mgmt - Voiding independently, PVR      Precautions / PROPHYLAXIS:   - Falls, Cardiac   - Ortho: Weight bearing status: WBAT   - Lungs: Aspiration, Incentive Spirometer   - Pressure injury/Skin: Turn Q2hrs while in bed, OOB to Chair, PT/OT    - DVT: Lovenox    MEDICAL PROGNOSIS: GOOD            REHAB POTENTIAL: GOOD             ESTIMATED DISPOSITION: HOME WITH HOME CARE            ELOS: 10-14 Days   EXPECTED THERAPY:     P.T. 1hr/day       O.T. 1hr/day      S.L.P. 1hr/day     P&O Unnecessary     EXP FREQUENCY: 5 days per 7 day period     PRESCREEN COMPARISION:   I have reviewed the prescreen information and I have found no relevant changes between the preadmission screening and my post admission evaluation     RATIONALE FOR INPATIENT ADMISSION - Patient demonstrates the following: (check all that apply)  [X] Medically appropriate for rehabilitation admission  [X] Has attainable rehab goals with an appropriate initial discharge plan  [X] Has rehabilitation potential (expected to make a significant improvement within a reasonable period of time)   [X] Requires close medical management by a rehab physician, rehab nursing care, Hospitalist and comprehensive interdisciplinary team (including PT, OT, & or SLP, Prosthetics and Orthotics) FRANCISCO NEUMANN is a 73yo Female s/p bilateral CVAs, now with decreased functional mobility, gait instability and ADL impairments.      COMORBIDITES/ACTIVE MEDICAL ISSUES     Gait Instability, ADL impairments and Functional impairments: start Comprehensive Rehab Program of PT/OT     #CVA  -ASA, Plavix and Statin. neurology follow up outpt. cardiology follow up outpt for ILR.    #PAT  -continue Toprol, monitor VS.    #HTN  -Lisinopril, monitor VS closely.      Pain Mgmt - Tylenol PRN  GI/Bowel Mgmt - Colace, Senna,  Miralax PRN  /Bladder Mgmt - Voiding independently, PVR      Precautions / PROPHYLAXIS:   - Falls, Cardiac   - Ortho: Weight bearing status: WBAT   - Lungs: Aspiration, Incentive Spirometer   - Pressure injury/Skin: Turn Q2hrs while in bed, OOB to Chair, PT/OT    - DVT: Lovenox    MEDICAL PROGNOSIS: GOOD            REHAB POTENTIAL: GOOD             ESTIMATED DISPOSITION: HOME WITH HOME CARE            ELOS: 10-14 Days   EXPECTED THERAPY:     P.T. 1hr/day       O.T. 1hr/day      S.L.P. 1hr/day     P&O Unnecessary     EXP FREQUENCY: 5 days per 7 day period     PRESCREEN COMPARISION:   I have reviewed the prescreen information and I have found no relevant changes between the preadmission screening and my post admission evaluation     RATIONALE FOR INPATIENT ADMISSION - Patient demonstrates the following: (check all that apply)  [X] Medically appropriate for rehabilitation admission  [X] Has attainable rehab goals with an appropriate initial discharge plan  [X] Has rehabilitation potential (expected to make a significant improvement within a reasonable period of time)   [X] Requires close medical management by a rehab physician, rehab nursing care, Hospitalist and comprehensive interdisciplinary team (including PT, OT, & or SLP, Prosthetics and Orthotics)

## 2020-03-24 NOTE — H&P ADULT - HISTORY OF PRESENT ILLNESS
73 yo female with PMH of peripheral vertigo (intermittent for past two years) presented on 03/21 with complaints of unsteady gait. Patient stated she woke up on Tuesday 3/17 and noticed difficulty ambulating. No room spinning sensation noted. States she was just off balance when walking. Swaying to both sides. Has not remitted since. She also notes that she felt "numb" in her LUE and LLE (now resolved). Denied changes in speech, vision, hearing, swallowing, voice quality, numbness/tingling, weakness. No previous history of CVA/TIA, seizures, migraines.     Brain MRI (03/21/20): Multiple foci of diffusion restriction in the right medial temporal lobe, right occipital cortex, right posterior limb of internal capsule and left centrum semiovale and possible midportion of the medulla which may be related to cardioembolic source versus hypercoagulability. Moderate atrophy and small vessel white matter ischemic changes.  CT HEAD: No acute intracranial bleeding, mass effect, or shift.    CTA BRAIN: Atheromatous irregularity along the carotid siphons bilaterally. Otherwise, no flow-limiting stenosis or occlusion.   CTA NECK: Atheromatous calcification along the carotid bulbs bilaterally without significant stenosis. No flow limiting stenosis or occlusion.     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 Embolic stroke of undetermined source. 73 yo female with PMH of peripheral vertigo (intermittent for past two years) presented to ED on 3/21 with complaints of unsteady gait and left sided numbness since 3/17. 'Off balance' when walking. MR 3/21 brain showed CVAs of right medial temporal lobe, right occipital cortex, right posterior limb of internal capsule and left centrum semiovale and possible midportion of the medulla which may be related to cardioembolic source versus hypercoagulability. Moderate atrophy and small vessel white matter ischemic changes. CTA showed atheromatous irregularity along the carotid siphons/carotid bulbs bilaterally. Otherwise, no flow-limiting stenosis or occlusion. BINH showed severe atheroma of aortic arch, small PFO, no thrombus in PITA, EF 55%. US LEs neg on 3/23. A1C 5.9% and LDL 67. ASA/Plavix started. Cardio eval requested for PAT on 3/22. ILR plan as outpt. Low dose toprol started for rate control. Cleared for d/c to [facility] on 3/24.

## 2020-03-24 NOTE — DISCHARGE NOTE NURSING/CASE MANAGEMENT/SOCIAL WORK - PATIENT PORTAL LINK FT
You can access the FollowMyHealth Patient Portal offered by [facility] by registering at the following website: [email or web address]. By joining [facility]’s FollowMyHealth portal, you will also be able to view your health information using other applications (apps) compatible with our system.

## 2020-03-25 LAB
ALBUMIN SERPL ELPH-MCNC: 3 G/DL — LOW (ref 3.3–5)
ALP SERPL-CCNC: 64 U/L — SIGNIFICANT CHANGE UP (ref 40–120)
ALT FLD-CCNC: 31 U/L — SIGNIFICANT CHANGE UP (ref 10–45)
ANION GAP SERPL CALC-SCNC: 8 MMOL/L — SIGNIFICANT CHANGE UP (ref 5–17)
AST SERPL-CCNC: 27 U/L — SIGNIFICANT CHANGE UP (ref 10–40)
BASOPHILS # BLD AUTO: 0.08 K/UL — SIGNIFICANT CHANGE UP (ref 0–0.2)
BASOPHILS NFR BLD AUTO: 1.3 % — SIGNIFICANT CHANGE UP (ref 0–2)
BILIRUB SERPL-MCNC: 0.3 MG/DL — SIGNIFICANT CHANGE UP (ref 0.2–1.2)
BUN SERPL-MCNC: 18 MG/DL — SIGNIFICANT CHANGE UP (ref 7–23)
CALCIUM SERPL-MCNC: 8.9 MG/DL — SIGNIFICANT CHANGE UP (ref 8.4–10.5)
CHLORIDE SERPL-SCNC: 104 MMOL/L — SIGNIFICANT CHANGE UP (ref 96–108)
CO2 SERPL-SCNC: 26 MMOL/L — SIGNIFICANT CHANGE UP (ref 22–31)
CREAT SERPL-MCNC: 0.64 MG/DL — SIGNIFICANT CHANGE UP (ref 0.5–1.3)
EOSINOPHIL # BLD AUTO: 0.2 K/UL — SIGNIFICANT CHANGE UP (ref 0–0.5)
EOSINOPHIL NFR BLD AUTO: 3.3 % — SIGNIFICANT CHANGE UP (ref 0–6)
GLUCOSE SERPL-MCNC: 99 MG/DL — SIGNIFICANT CHANGE UP (ref 70–99)
HCT VFR BLD CALC: 38.9 % — SIGNIFICANT CHANGE UP (ref 34.5–45)
HGB BLD-MCNC: 13.4 G/DL — SIGNIFICANT CHANGE UP (ref 11.5–15.5)
IMM GRANULOCYTES NFR BLD AUTO: 0.5 % — SIGNIFICANT CHANGE UP (ref 0–1.5)
LYMPHOCYTES # BLD AUTO: 2.01 K/UL — SIGNIFICANT CHANGE UP (ref 1–3.3)
LYMPHOCYTES # BLD AUTO: 33.1 % — SIGNIFICANT CHANGE UP (ref 13–44)
MCHC RBC-ENTMCNC: 32.3 PG — SIGNIFICANT CHANGE UP (ref 27–34)
MCHC RBC-ENTMCNC: 34.4 GM/DL — SIGNIFICANT CHANGE UP (ref 32–36)
MCV RBC AUTO: 93.7 FL — SIGNIFICANT CHANGE UP (ref 80–100)
MONOCYTES # BLD AUTO: 0.65 K/UL — SIGNIFICANT CHANGE UP (ref 0–0.9)
MONOCYTES NFR BLD AUTO: 10.7 % — SIGNIFICANT CHANGE UP (ref 2–14)
NEUTROPHILS # BLD AUTO: 3.11 K/UL — SIGNIFICANT CHANGE UP (ref 1.8–7.4)
NEUTROPHILS NFR BLD AUTO: 51.1 % — SIGNIFICANT CHANGE UP (ref 43–77)
NRBC # BLD: 0 /100 WBCS — SIGNIFICANT CHANGE UP (ref 0–0)
PLATELET # BLD AUTO: 272 K/UL — SIGNIFICANT CHANGE UP (ref 150–400)
POTASSIUM SERPL-MCNC: 4.2 MMOL/L — SIGNIFICANT CHANGE UP (ref 3.5–5.3)
POTASSIUM SERPL-SCNC: 4.2 MMOL/L — SIGNIFICANT CHANGE UP (ref 3.5–5.3)
PROT SERPL-MCNC: 6 G/DL — SIGNIFICANT CHANGE UP (ref 6–8.3)
RBC # BLD: 4.15 M/UL — SIGNIFICANT CHANGE UP (ref 3.8–5.2)
RBC # FLD: 12.7 % — SIGNIFICANT CHANGE UP (ref 10.3–14.5)
SODIUM SERPL-SCNC: 138 MMOL/L — SIGNIFICANT CHANGE UP (ref 135–145)
WBC # BLD: 6.08 K/UL — SIGNIFICANT CHANGE UP (ref 3.8–10.5)
WBC # FLD AUTO: 6.08 K/UL — SIGNIFICANT CHANGE UP (ref 3.8–10.5)

## 2020-03-25 PROCEDURE — 99233 SBSQ HOSP IP/OBS HIGH 50: CPT

## 2020-03-25 RX ORDER — METOPROLOL TARTRATE 50 MG
25 TABLET ORAL
Refills: 0 | Status: DISCONTINUED | OUTPATIENT
Start: 2020-03-25 | End: 2020-03-30

## 2020-03-25 RX ADMIN — LISINOPRIL 5 MILLIGRAM(S): 2.5 TABLET ORAL at 06:14

## 2020-03-25 RX ADMIN — ENOXAPARIN SODIUM 40 MILLIGRAM(S): 100 INJECTION SUBCUTANEOUS at 12:14

## 2020-03-25 RX ADMIN — CLOPIDOGREL BISULFATE 75 MILLIGRAM(S): 75 TABLET, FILM COATED ORAL at 12:14

## 2020-03-25 RX ADMIN — ATORVASTATIN CALCIUM 10 MILLIGRAM(S): 80 TABLET, FILM COATED ORAL at 21:00

## 2020-03-25 RX ADMIN — Medication 25 MILLIGRAM(S): at 06:14

## 2020-03-25 RX ADMIN — Medication 81 MILLIGRAM(S): at 12:14

## 2020-03-25 NOTE — CONSULT NOTE ADULT - ATTENDING COMMENTS
I have personally examined this pt, reviewed pertinent clinical information and performed the evaluation and management service provided at today's patient visit for inpatient medical consultation    I am available on the unit for any questions regarding this patients medical plan of care and can be reached by cell phone at [phone]

## 2020-03-25 NOTE — CHART NOTE - NSCHARTNOTEFT_GEN_A_CORE
REHABILITATION DIAGNOSIS/IMPAIRMENT GROUP CODE:  left sided weakness s/p CVA    COMORBIDITIES/COMPLICATING CONDITIONS IMPACTING REHABILITATION:  HEALTH ISSUES - PROBLEM Dx: HTN, vertigo        PAST MEDICAL & SURGICAL HISTORY:  No pertinent past medical history  No significant past surgical history      Based upon consideration of the patient's impairments, functional status, complicating conditions and any other contributing factors and after information garnered from the assessments of all therapy disciplines involved in treating the patient and other pertinent clinicians:    INTERDISCIPLINARY REHABILITATION INTERVENTIONS:    [ x  ] Transfer Training  [ x  ] Bed Mobility  [ xx  ] Therapeutic Exercise  [ x  ] Balance/Coordination Exercises  [ x  ] Locomotion retraining  [ x  ] Stairs  [ x  ] Functional Transfer Training  [ x  ] Bowel/Bladder program  [ x  ] Pain Management  [   ] Skin/Wound Care  [ x  ] Visual/Perceptual Training  [ x  ] Therapeutic Recreation Activities  [ x  ] Neuromuscular Re-education  [ x  ] Activities of Daily Living  [ x  ] Speech Exercise  [ x  ] Swallowing Exercises  [  x ] Vital Stim  [   ] Dietary Supplements  [   ] Calorie Count  [ x  ] Cognitive Exercises  [ x  ] Congnitive/Linguistic Treatment  [ x  ] Behavior Program  [ x  ] Neuropsych Therapy  [ x  ] Patient/Family Counseling  [  x ] Family Training  [ x  ] Community Re-entry  [ x  ] Orthotic Evaluation  [   ] Prosthetic Eval/Training    MEDICAL PROGNOSIS:  good    REHAB POTENTIAL:  excellent    EXPECTED DAILY THERAPY:         PT: 1h       OT: 1h       ST: 1h       S&O: eval    EXPECTED INTENSITY OF PROGRAM:  3h daily. 5d week    EXPECTED FREQUENCY OF PROGRAM:  daily    ESTIMATED LOS:  10d    ESTIMATED DISPOSITION:  home    INTERDISCIPLINARY FUNCTIONAL OUTCOMES?GOALS:         Gait/Mobility:       Transfers:       ADLs:       Functional Transfers:       Medication Management:       Communication:       Cognitive:       Dysphagia:       Bladder       Bowel:

## 2020-03-25 NOTE — CONSULT NOTE ADULT - ASSESSMENT
FRANCISCO NEUMANN is a 75yo Female s/p bilateral CVAs, now with decreased functional mobility, gait instability and ADL impairments.    >CVA  -medically stable to begin comprehensive inpatient rehabilitation program of PT OT ST  -continue ASA, Plavix and Statin.   -outpt. cardiology follow up for ILR.    >PAT  -continue Toprol,     >HTN: stable  -continue Lisinopril    >DVTP  -continue enoxaparin FARNCISCO NEUMANN is a 75yo Female s/p bilateral CVAs, now with decreased functional mobility, gait instability and ADL impairments.    >CVA  -medically stable to begin comprehensive inpatient rehabilitation program of PT OT ST  -continue ASA, Plavix and Statin.   -outpt. cardiology follow up for ILR.    >PAT  -continue Toprol,     >HTN: stable  -continue Lisinopril    >Smoking cessation  -offered nicotine replacement pt declined states "I'm fine so far"  -outpatient smoking cessation referral once discharged    >DVTP  -continue enoxaparin

## 2020-03-26 PROCEDURE — 99232 SBSQ HOSP IP/OBS MODERATE 35: CPT

## 2020-03-26 PROCEDURE — 99233 SBSQ HOSP IP/OBS HIGH 50: CPT

## 2020-03-26 PROCEDURE — 99222 1ST HOSP IP/OBS MODERATE 55: CPT

## 2020-03-26 RX ORDER — FAMOTIDINE 10 MG/ML
20 INJECTION INTRAVENOUS DAILY
Refills: 0 | Status: DISCONTINUED | OUTPATIENT
Start: 2020-03-26 | End: 2020-03-31

## 2020-03-26 RX ORDER — POLYETHYLENE GLYCOL 3350 17 G/17G
17 POWDER, FOR SOLUTION ORAL AT BEDTIME
Refills: 0 | Status: DISCONTINUED | OUTPATIENT
Start: 2020-03-26 | End: 2020-03-27

## 2020-03-26 RX ADMIN — LISINOPRIL 5 MILLIGRAM(S): 2.5 TABLET ORAL at 05:46

## 2020-03-26 RX ADMIN — ENOXAPARIN SODIUM 40 MILLIGRAM(S): 100 INJECTION SUBCUTANEOUS at 11:49

## 2020-03-26 RX ADMIN — FAMOTIDINE 20 MILLIGRAM(S): 10 INJECTION INTRAVENOUS at 11:49

## 2020-03-26 RX ADMIN — Medication 81 MILLIGRAM(S): at 11:49

## 2020-03-26 RX ADMIN — ATORVASTATIN CALCIUM 10 MILLIGRAM(S): 80 TABLET, FILM COATED ORAL at 21:04

## 2020-03-26 RX ADMIN — CLOPIDOGREL BISULFATE 75 MILLIGRAM(S): 75 TABLET, FILM COATED ORAL at 11:49

## 2020-03-27 PROCEDURE — 99232 SBSQ HOSP IP/OBS MODERATE 35: CPT

## 2020-03-27 RX ADMIN — LISINOPRIL 5 MILLIGRAM(S): 2.5 TABLET ORAL at 06:57

## 2020-03-27 RX ADMIN — ATORVASTATIN CALCIUM 10 MILLIGRAM(S): 80 TABLET, FILM COATED ORAL at 21:31

## 2020-03-27 RX ADMIN — FAMOTIDINE 20 MILLIGRAM(S): 10 INJECTION INTRAVENOUS at 11:42

## 2020-03-27 RX ADMIN — Medication 81 MILLIGRAM(S): at 11:42

## 2020-03-27 RX ADMIN — CLOPIDOGREL BISULFATE 75 MILLIGRAM(S): 75 TABLET, FILM COATED ORAL at 11:42

## 2020-03-27 RX ADMIN — ENOXAPARIN SODIUM 40 MILLIGRAM(S): 100 INJECTION SUBCUTANEOUS at 11:43

## 2020-03-28 PROCEDURE — 99232 SBSQ HOSP IP/OBS MODERATE 35: CPT

## 2020-03-28 RX ADMIN — CLOPIDOGREL BISULFATE 75 MILLIGRAM(S): 75 TABLET, FILM COATED ORAL at 12:11

## 2020-03-28 RX ADMIN — ATORVASTATIN CALCIUM 10 MILLIGRAM(S): 80 TABLET, FILM COATED ORAL at 21:28

## 2020-03-28 RX ADMIN — Medication 81 MILLIGRAM(S): at 12:11

## 2020-03-28 RX ADMIN — LISINOPRIL 5 MILLIGRAM(S): 2.5 TABLET ORAL at 06:10

## 2020-03-28 RX ADMIN — ENOXAPARIN SODIUM 40 MILLIGRAM(S): 100 INJECTION SUBCUTANEOUS at 12:11

## 2020-03-28 RX ADMIN — FAMOTIDINE 20 MILLIGRAM(S): 10 INJECTION INTRAVENOUS at 12:11

## 2020-03-29 PROCEDURE — 99232 SBSQ HOSP IP/OBS MODERATE 35: CPT

## 2020-03-29 RX ADMIN — Medication 81 MILLIGRAM(S): at 12:17

## 2020-03-29 RX ADMIN — ENOXAPARIN SODIUM 40 MILLIGRAM(S): 100 INJECTION SUBCUTANEOUS at 12:16

## 2020-03-29 RX ADMIN — LISINOPRIL 5 MILLIGRAM(S): 2.5 TABLET ORAL at 05:43

## 2020-03-29 RX ADMIN — FAMOTIDINE 20 MILLIGRAM(S): 10 INJECTION INTRAVENOUS at 12:17

## 2020-03-29 RX ADMIN — ATORVASTATIN CALCIUM 10 MILLIGRAM(S): 80 TABLET, FILM COATED ORAL at 21:17

## 2020-03-29 RX ADMIN — CLOPIDOGREL BISULFATE 75 MILLIGRAM(S): 75 TABLET, FILM COATED ORAL at 12:17

## 2020-03-30 ENCOUNTER — TRANSCRIPTION ENCOUNTER (OUTPATIENT)
Age: 75
End: 2020-03-30

## 2020-03-30 LAB
ALBUMIN SERPL ELPH-MCNC: 3.2 G/DL — LOW (ref 3.3–5)
ALP SERPL-CCNC: 60 U/L — SIGNIFICANT CHANGE UP (ref 40–120)
ALT FLD-CCNC: 39 U/L — SIGNIFICANT CHANGE UP (ref 10–45)
ANION GAP SERPL CALC-SCNC: 6 MMOL/L — SIGNIFICANT CHANGE UP (ref 5–17)
AST SERPL-CCNC: 22 U/L — SIGNIFICANT CHANGE UP (ref 10–40)
BILIRUB SERPL-MCNC: 0.2 MG/DL — SIGNIFICANT CHANGE UP (ref 0.2–1.2)
BUN SERPL-MCNC: 16 MG/DL — SIGNIFICANT CHANGE UP (ref 7–23)
CALCIUM SERPL-MCNC: 9.1 MG/DL — SIGNIFICANT CHANGE UP (ref 8.4–10.5)
CHLORIDE SERPL-SCNC: 103 MMOL/L — SIGNIFICANT CHANGE UP (ref 96–108)
CO2 SERPL-SCNC: 28 MMOL/L — SIGNIFICANT CHANGE UP (ref 22–31)
CREAT SERPL-MCNC: 0.72 MG/DL — SIGNIFICANT CHANGE UP (ref 0.5–1.3)
GLUCOSE SERPL-MCNC: 82 MG/DL — SIGNIFICANT CHANGE UP (ref 70–99)
HCT VFR BLD CALC: 39.4 % — SIGNIFICANT CHANGE UP (ref 34.5–45)
HGB BLD-MCNC: 13.7 G/DL — SIGNIFICANT CHANGE UP (ref 11.5–15.5)
MCHC RBC-ENTMCNC: 32.6 PG — SIGNIFICANT CHANGE UP (ref 27–34)
MCHC RBC-ENTMCNC: 34.8 GM/DL — SIGNIFICANT CHANGE UP (ref 32–36)
MCV RBC AUTO: 93.8 FL — SIGNIFICANT CHANGE UP (ref 80–100)
NRBC # BLD: 0 /100 WBCS — SIGNIFICANT CHANGE UP (ref 0–0)
PLATELET # BLD AUTO: 290 K/UL — SIGNIFICANT CHANGE UP (ref 150–400)
POTASSIUM SERPL-MCNC: 4.3 MMOL/L — SIGNIFICANT CHANGE UP (ref 3.5–5.3)
POTASSIUM SERPL-SCNC: 4.3 MMOL/L — SIGNIFICANT CHANGE UP (ref 3.5–5.3)
PROT SERPL-MCNC: 6.2 G/DL — SIGNIFICANT CHANGE UP (ref 6–8.3)
RBC # BLD: 4.2 M/UL — SIGNIFICANT CHANGE UP (ref 3.8–5.2)
RBC # FLD: 12.5 % — SIGNIFICANT CHANGE UP (ref 10.3–14.5)
SODIUM SERPL-SCNC: 137 MMOL/L — SIGNIFICANT CHANGE UP (ref 135–145)
WBC # BLD: 7.63 K/UL — SIGNIFICANT CHANGE UP (ref 3.8–10.5)
WBC # FLD AUTO: 7.63 K/UL — SIGNIFICANT CHANGE UP (ref 3.8–10.5)

## 2020-03-30 PROCEDURE — 99233 SBSQ HOSP IP/OBS HIGH 50: CPT

## 2020-03-30 RX ORDER — METOPROLOL TARTRATE 50 MG
12.5 TABLET ORAL
Refills: 0 | Status: DISCONTINUED | OUTPATIENT
Start: 2020-03-30 | End: 2020-03-31

## 2020-03-30 RX ORDER — FAMOTIDINE 10 MG/ML
1 INJECTION INTRAVENOUS
Qty: 0 | Refills: 0 | DISCHARGE
Start: 2020-03-30

## 2020-03-30 RX ORDER — ATORVASTATIN CALCIUM 80 MG/1
1 TABLET, FILM COATED ORAL
Qty: 0 | Refills: 0 | DISCHARGE
Start: 2020-03-30

## 2020-03-30 RX ORDER — LISINOPRIL 2.5 MG/1
1 TABLET ORAL
Qty: 0 | Refills: 0 | DISCHARGE
Start: 2020-03-30

## 2020-03-30 RX ORDER — MECLIZINE HCL 12.5 MG
1 TABLET ORAL
Qty: 0 | Refills: 0 | DISCHARGE

## 2020-03-30 RX ORDER — METOPROLOL TARTRATE 50 MG
12.5 TABLET ORAL
Qty: 0 | Refills: 0 | DISCHARGE
Start: 2020-03-30

## 2020-03-30 RX ORDER — ASPIRIN/CALCIUM CARB/MAGNESIUM 324 MG
1 TABLET ORAL
Qty: 0 | Refills: 0 | DISCHARGE
Start: 2020-03-30

## 2020-03-30 RX ORDER — CLOPIDOGREL BISULFATE 75 MG/1
1 TABLET, FILM COATED ORAL
Qty: 0 | Refills: 0 | DISCHARGE
Start: 2020-03-30

## 2020-03-30 RX ADMIN — FAMOTIDINE 20 MILLIGRAM(S): 10 INJECTION INTRAVENOUS at 12:21

## 2020-03-30 RX ADMIN — Medication 81 MILLIGRAM(S): at 12:21

## 2020-03-30 RX ADMIN — ENOXAPARIN SODIUM 40 MILLIGRAM(S): 100 INJECTION SUBCUTANEOUS at 12:21

## 2020-03-30 RX ADMIN — CLOPIDOGREL BISULFATE 75 MILLIGRAM(S): 75 TABLET, FILM COATED ORAL at 12:21

## 2020-03-30 RX ADMIN — LISINOPRIL 5 MILLIGRAM(S): 2.5 TABLET ORAL at 05:26

## 2020-03-30 RX ADMIN — ATORVASTATIN CALCIUM 10 MILLIGRAM(S): 80 TABLET, FILM COATED ORAL at 21:18

## 2020-03-30 NOTE — DISCHARGE NOTE PROVIDER - CARE PROVIDERS DIRECT ADDRESSES
,DirectAddress_Unknown,DirectAddress_Unknown ,DirectAddress_Unknown,DirectAddress_Unknown,[email or web address]

## 2020-03-30 NOTE — DISCHARGE NOTE PROVIDER - CARE PROVIDER_API CALL
Demetris Dumont (DO)  Neurology; Vascular Neurology  [address]  [place] [address]  Phone: [phone]  Fax: [phone]  Follow Up Time:     Gopi Abarca (DO)  Cardiovascular Disease  [address] [place] [address], [address]  [place] [address]  Phone: [phone]  Fax: [phone]  Follow Up Time: Demetris Dumont (DO)  Neurology; Vascular Neurology  [address]  [place] [address]  Phone: [phone]  Fax: [phone]  Follow Up Time:     Gopi Abarca (DO)  Cardiovascular Disease  [address], [address]  [place] [address]  Phone: [phone]  Fax: [phone]  Follow Up Time:     David Ahn)  Medicine  Gen Intrnl Medicine  [address], [address]  [place] [address]  Phone: [phone]  Fax: [phone]  Follow Up Time:     Eddie Tate)  Internal Medicine; Nuclear Cardiology  [address] [place] [address]  Phone: [phone]  Fax: [phone]  Follow Up Time:

## 2020-03-30 NOTE — DISCHARGE NOTE PROVIDER - NSDCACTIVITY_GEN_ALL_CORE
Stairs allowed/Do not drive or operate machinery/Do not make important decisions/Walking - Indoors allowed/No heavy lifting/straining/Showering allowed/Sex allowed/Walking - Outdoors allowed

## 2020-03-30 NOTE — DISCHARGE NOTE PROVIDER - NSDCMRMEDTOKEN_GEN_ALL_CORE_FT
aspirin 81 mg oral delayed release tablet: 1 tab(s) orally once a day  atorvastatin 10 mg oral tablet: 1 tab(s) orally once a day (at bedtime)  clopidogrel 75 mg oral tablet: 1 tab(s) orally once a day  famotidine 20 mg oral tablet: 1 tab(s) orally once a day  lisinopril 5 mg oral tablet: 1 tab(s) orally once a day  metoprolol: 12.5 milligram(s) orally 2 times a day, hold for BP&lt;110 or HR&lt;60 aspirin 81 mg oral delayed release tablet: 1 tab(s) orally once a day  atorvastatin 10 mg oral tablet: 1 tab(s) orally once a day (at bedtime)  clopidogrel 75 mg oral tablet: 1 tab(s) orally once a day  famotidine 20 mg oral tablet: 1 tab(s) orally once a day  lisinopril 5 mg oral tablet: 1 tab(s) orally once a day, hold for BP&lt;110  metoprolol tartrate 25 mg oral tablet: 0.5 tab(s) orally 2 times a day, hold for BP&lt;110 or HR&lt;60.

## 2020-03-30 NOTE — DISCHARGE NOTE PROVIDER - HOSPITAL COURSE
73 yo female with PMH of peripheral vertigo (intermittent for past two years) presented to ED on 3/21 with complaints of unsteady gait and left sided numbness since 3/17. 'Off balance' when walking. MR 3/21 brain showed CVAs of right medial temporal lobe, right occipital cortex, right posterior limb of internal capsule and left centrum semiovale and possible midportion of the medulla which may be related to cardioembolic source versus hypercoagulability. Moderate atrophy and small vessel white matter ischemic changes. CTA showed atheromatous irregularity along the carotid siphons/carotid bulbs bilaterally. Otherwise, no flow-limiting stenosis or occlusion. BINH showed severe atheroma of aortic arch, small PFO, no thrombus in PITA, EF 55%. US LEs neg on 3/23. A1C 5.9% and LDL 67. ASA/Plavix started. Cardio eval requested for PAT on 3/22. ILR plan as outpt. Low dose toprol started for rate control. Cleared for d/c to [facility] on 3/24. ( 75 yo female with PMH of peripheral vertigo (intermittent for past two years) presented to ED on 3/21 with complaints of unsteady gait and left sided numbness since 3/17. 'Off balance' when walking. MR 3/21 brain showed CVAs of right medial temporal lobe, right occipital cortex, right posterior limb of internal capsule and left centrum semiovale and possible midportion of the medulla which may be related to cardioembolic source versus hypercoagulability. Moderate atrophy and small vessel white matter ischemic changes. CTA showed atheromatous irregularity along the carotid siphons/carotid bulbs bilaterally. Otherwise, no flow-limiting stenosis or occlusion. BINH showed severe atheroma of aortic arch, small PFO, no thrombus in PITA, EF 55%. US LEs neg on 3/23. A1C 5.9% and LDL 67. ASA/Plavix started. Cardio eval requested for PAT on 3/22. ILR plan as outpt. Low dose toprol started for rate control. Cleared for d/c to [facility] on 3/24.     While at [facility] rehab, patient completed comprehensive PT/OT/SLP program with excellent improvement to date. Performs activities with supervision/mod I. Balance improved. While at rehab patient evaluated by medicine and cardiology. Bradycardia 40s/asymptomatic reported. Metoprolol continues with parameters and cardio follow up outpt requested. Cleared for d/c home with home care on 3/31/20.

## 2020-03-30 NOTE — DISCHARGE NOTE PROVIDER - PROVIDER TOKENS
PROVIDER:[TOKEN:[[id number]]],PROVIDER:[TOKEN:[[id number]]] PROVIDER:[TOKEN:[[id number]]],PROVIDER:[TOKEN:[[id number]]],PROVIDER:[TOKEN:[[id number]]],PROVIDER:[TOKEN:[[id number]]]

## 2020-03-30 NOTE — DISCHARGE NOTE PROVIDER - NSDCCPCAREPLAN_GEN_ALL_CORE_FT
PRINCIPAL DISCHARGE DIAGNOSIS  Diagnosis: CVA (cerebrovascular accident)  Assessment and Plan of Treatment: Continue ASA/Plavix and follow up with Neurology in 1 week. Follow up with cardiology for Loop recorder placement.      SECONDARY DISCHARGE DIAGNOSES  Diagnosis: Atheroma  Assessment and Plan of Treatment: Follow up with Cardiology in 1 week.    Diagnosis: HTN (hypertension)  Assessment and Plan of Treatment: Continue Lisinopril. Check BP daily, hold BP medications if BP<110.

## 2020-03-31 ENCOUNTER — TRANSCRIPTION ENCOUNTER (OUTPATIENT)
Age: 75
End: 2020-03-31

## 2020-03-31 VITALS — DIASTOLIC BLOOD PRESSURE: 73 MMHG | SYSTOLIC BLOOD PRESSURE: 143 MMHG | HEART RATE: 53 BPM

## 2020-03-31 PROCEDURE — 99239 HOSP IP/OBS DSCHRG MGMT >30: CPT

## 2020-03-31 PROCEDURE — 99232 SBSQ HOSP IP/OBS MODERATE 35: CPT

## 2020-03-31 RX ORDER — FAMOTIDINE 10 MG/ML
1 INJECTION INTRAVENOUS
Qty: 30 | Refills: 0
Start: 2020-03-31 | End: 2020-04-29

## 2020-03-31 RX ORDER — CLOPIDOGREL BISULFATE 75 MG/1
1 TABLET, FILM COATED ORAL
Qty: 30 | Refills: 0
Start: 2020-03-31 | End: 2020-04-29

## 2020-03-31 RX ORDER — LISINOPRIL 2.5 MG/1
1 TABLET ORAL
Qty: 30 | Refills: 0
Start: 2020-03-31 | End: 2020-04-29

## 2020-03-31 RX ORDER — METOPROLOL TARTRATE 50 MG
0.5 TABLET ORAL
Qty: 30 | Refills: 0
Start: 2020-03-31 | End: 2020-04-29

## 2020-03-31 RX ORDER — ATORVASTATIN CALCIUM 80 MG/1
1 TABLET, FILM COATED ORAL
Qty: 30 | Refills: 0
Start: 2020-03-31 | End: 2020-04-29

## 2020-03-31 RX ADMIN — FAMOTIDINE 20 MILLIGRAM(S): 10 INJECTION INTRAVENOUS at 12:09

## 2020-03-31 RX ADMIN — CLOPIDOGREL BISULFATE 75 MILLIGRAM(S): 75 TABLET, FILM COATED ORAL at 12:08

## 2020-03-31 RX ADMIN — ENOXAPARIN SODIUM 40 MILLIGRAM(S): 100 INJECTION SUBCUTANEOUS at 12:09

## 2020-03-31 RX ADMIN — Medication 81 MILLIGRAM(S): at 12:08

## 2020-03-31 RX ADMIN — LISINOPRIL 5 MILLIGRAM(S): 2.5 TABLET ORAL at 06:51

## 2020-03-31 NOTE — DISCHARGE NOTE NURSING/CASE MANAGEMENT/SOCIAL WORK - NSDCPEPTSTRK_GEN_ALL_CORE
Stroke support groups for patients, families, and friends/Stroke warning signs and symptoms/Signs and symptoms of stroke/Call 911 for stroke/Need for follow up after discharge/Stroke education booklet/Prescribed medications/Risk factors for stroke

## 2020-03-31 NOTE — PROGRESS NOTE ADULT - REASON FOR ADMISSION
s/p left and right CVAs now with functional deficits.

## 2020-03-31 NOTE — PROGRESS NOTE ADULT - ATTENDING COMMENTS
I have personally interviewed and examined this patient, reviewed pertinent information and performed the evaluation and management service provided at today's visit for inpatient medical follow up     I am available on the unit for any questions regarding this patient's medical issues, and can be reached by cell phone at [phone]
stroke  for now working diagnosis is cva on the basis of aortic atheroma. continue aspirin clopidogrel statin and an ACE inhibitor. that notwithstanding patient is being planned for  an ilr internal loop recorder to exclude atrial fibrillation. she has a private cardiologist whom she is in touch with. for the  time being may adjust av daniel blocker as tolerated.
I have personally interviewed and examined this patient, reviewed pertinent information and performed the evaluation and management service provided at today's visit for inpatient medical follow up     I am available on the unit for any questions regarding this patient's medical issues, and can be reached by cell phone at [phone]
I have personally interviewed and examined this patient, reviewed pertinent information and performed the evaluation and management service provided at today's visit for inpatient medical follow up     I am available on the unit for any questions regarding this patient's medical issues, and can be reached by cell phone at [phone]
I have personally interviewed and examined this patient, reviewed pertinent information and performed the evaluation and management service provided at today's visit for inpatient medical follow up     I am available on the unit for any questions regarding this patient's medical issues, and can be reached by cell phone at [phone]
Excellent progress in therapy   No new complains   Improving neurological exam  Multidisciplinary team meeting today:  patient's functional goals and needs, functional and clinical  progress were discussed, barriers to discharge were identified. Anticipate discharge home with home care, 24/7 supervision for safety in the morning.   Discharge plan discussed with the patient, all questions answered to her satisfaction.
No complaints, great spirits  Improved neurologically and functionally  On DAPT and statin for stroke ppx  Patient is being discharged home with home care  today.  Discharge instructions were discussed with patient and family, all current medications were sent to the pharmacy. Patient and family were educated on importance of medication compliance,  continued  care with PMD and follow-up care with the specialists in the community. Safety and fall risk precautions  were discussed in detail, counseled on healthy life style modifications.  All questions were answered to their satisfaction.
Patient medically and neurologically  stable. Making progress towards rehab goals.   Continue rehab program.
Patient medically and neurologically  stable. Making progress towards rehab goals.   Continue rehab program.

## 2020-03-31 NOTE — PROGRESS NOTE ADULT - PROVIDER SPECIALTY LIST ADULT
Cardiology
Hospitalist
Neurology
Rehab Medicine
Rehab Medicine
Hospitalist

## 2020-03-31 NOTE — PROGRESS NOTE ADULT - SUBJECTIVE AND OBJECTIVE BOX
CC: follow up bilateral stroke  Slept well, denies HA CP SOB N V D constipation, upper respiratory symptoms and all other systems reviewed were negative    MEDICATIONS  (STANDING):  aspirin enteric coated 81 milliGRAM(s) Oral daily  atorvastatin 10 milliGRAM(s) Oral at bedtime  clopidogrel Tablet 75 milliGRAM(s) Oral daily  enoxaparin Injectable 40 milliGRAM(s) SubCutaneous daily  lisinopril 5 milliGRAM(s) Oral daily  metoprolol tartrate 25 milliGRAM(s) Oral two times a day    MEDICATIONS  (PRN):  famotidine    Tablet 20 milliGRAM(s) Oral daily PRN Dyspepsia    Vital Signs Last 24 Hrs  T(C): 36.7 (25 Mar 2020 20:57), Max: 36.7 (25 Mar 2020 20:57)  T(F): 98.1 (25 Mar 2020 20:57), Max: 98.1 (25 Mar 2020 20:57)  HR: 55 (26 Mar 2020 05:45) (48 - 55)  BP: 127/75 (26 Mar 2020 05:45) (119/65 - 139/73)  BP(mean): --  RR: 15 (26 Mar 2020 05:45) (15 - 15)  SpO2: 97% (26 Mar 2020 05:45) (95% - 97%)  PHYSICAL EXAM    GEN Alert and oriented, pleasant  HEENT NCAT EOMI MMM  Neck supple no JVD  Lungs clear  cor RSR  abd soft NT ND  Extr no edema  Neuro stable    LABS  (03-25 @ 06:57)                      13.4  6.08 )-----------( 272                 38.9    Neutrophils = 3.11 (51.1%)  Lymphocytes = 2.01 (33.1%)  Eosinophils = 0.20 (3.3%)  Basophils = 0.08 (1.3%)  Monocytes = 0.65 (10.7%)  Bands = --%    03-25    138  |  104  |  18  ----------------------------<  99  4.2   |  26  |  0.64    Ca    8.9      25 Mar 2020 06:57    TPro  6.0  /  Alb  3.0<L>  /  TBili  0.3  /  DBili  x   /  AST  27  /  ALT  31  /  AlkPhos  64  03-25          RVP:
CC: follow up stroke  No new issues overnight; slept well and continues to improve with therapies and has no new complaints  All systems were reviewed and were negative    MEDICATIONS  (STANDING):  aspirin enteric coated 81 milliGRAM(s) Oral daily  atorvastatin 10 milliGRAM(s) Oral at bedtime  clopidogrel Tablet 75 milliGRAM(s) Oral daily  enoxaparin Injectable 40 milliGRAM(s) SubCutaneous daily  famotidine    Tablet 20 milliGRAM(s) Oral daily  lisinopril 5 milliGRAM(s) Oral daily  metoprolol tartrate 25 milliGRAM(s) Oral two times a day    MEDICATIONS  (PRN):    Vital Signs Last 24 Hrs  T(C): 36.7 (27 Mar 2020 21:29), Max: 36.7 (27 Mar 2020 09:11)  T(F): 98.1 (27 Mar 2020 21:29), Max: 98.1 (27 Mar 2020 21:29)  HR: 54 (28 Mar 2020 05:55) (54 - 55)  BP: 144/84 (28 Mar 2020 05:55) (124/79 - 150/84)  BP(mean): --  RR: 14 (27 Mar 2020 21:29) (14 - 15)  SpO2: 97% (27 Mar 2020 21:29) (97% - 98%)    PHYSICAL EXAM    GEN Alert and oriented, pleasant  HEENT NCAT EOMI MMM  Neck supple no JVD  Lungs clear  cor RSR  abd soft NT ND  Extr no edema  Neuro stable      No new labs
CHIEF COMPLAINT: Impaired balance s/p CVA. Improving left sided weakness.       HISTORY OF PRESENT ILLNESS  73 yo female with PMH of peripheral vertigo (intermittent for past two years) presented to ED on 3/21 with complaints of unsteady gait and left sided numbness since 3/17. 'Off balance' when walking. MR 3/21 brain showed CVAs of right medial temporal lobe, right occipital cortex, right posterior limb of internal capsule and left centrum semiovale and possible midportion of the medulla which may be related to cardioembolic source versus hypercoagulability. Moderate atrophy and small vessel white matter ischemic changes. CTA showed atheromatous irregularity along the carotid siphons/carotid bulbs bilaterally. Otherwise, no flow-limiting stenosis or occlusion. BINH showed severe atheroma of aortic arch, small PFO, no thrombus in PITA, EF 55%. US LEs neg on 3/23. A1C 5.9% and LDL 67. ASA/Plavix started. Cardio eval requested for PAT on 3/22. ILR plan as outpt. Low dose toprol started for rate control. Cleared for d/c to AR on 3/24. (24 Mar 2020 13:14)      PAST MEDICAL & SURGICAL HISTORY:  No pertinent past medical history  No significant past surgical history       REVIEW OF SYMPTOMS  [X] Constitutional WNL        [X] Resp WNL           [X] GI WNL                          [X]  WNL                   [X] Heme WNL              [X] Endo WNL                     [X] Skin WNL                 [X] MSK WNL                     [X] Cognitive WNL        [X] Psych WNL      VITALS  Vital Signs Last 24 Hrs  T(C): 36.5 (30 Mar 2020 08:59), Max: 36.7 (29 Mar 2020 17:12)  T(F): 97.7 (30 Mar 2020 08:59), Max: 98 (29 Mar 2020 17:12)  HR: 59 (30 Mar 2020 08:59) (44 - 59)  BP: 131/83 (30 Mar 2020 08:59) (110/65 - 133/72)  BP(mean): --  RR: 14 (30 Mar 2020 08:59) (14 - 14)  SpO2: 99% (30 Mar 2020 08:59) (96% - 99%)      PHYSICAL EXAM  Constitutional - NAD, Comfortable  HEENT - NCAT, EOMI  Neck - Supple, No limited ROM  Chest - breathes comfortably  Abdomen - BS+, Soft, NTND  Extremities - No C/C/E, No calf tenderness   Skin-no rash  Wounds-none      Neurologic Exam - at baseline, A&Ox4. Improving balance.                      Psychiatric - Mood stable, Affect WNL         FUNCTIONAL PROGRESS  Gait - CG/supervision  ADLs - supervision  Transfers - supervision  Functional transfer - supervision    RECENT LABS      Direct LDL: 67 mg/dL (03-23-20 @ 09:00)    Hemoglobin A1C, Whole Blood: 5.9 % (03-22-20 @ 09:48)      RADIOLOGY/OTHER RESULTS      CURRENT MEDICATIONS  MEDICATIONS  (STANDING):  aspirin enteric coated 81 milliGRAM(s) Oral daily  atorvastatin 10 milliGRAM(s) Oral at bedtime  clopidogrel Tablet 75 milliGRAM(s) Oral daily  enoxaparin Injectable 40 milliGRAM(s) SubCutaneous daily  famotidine    Tablet 20 milliGRAM(s) Oral daily  lisinopril 5 milliGRAM(s) Oral daily  metoprolol tartrate 25 milliGRAM(s) Oral two times a day    MEDICATIONS  (PRN):      ASSESSMENT & PLAN          GI/Bowel Management - prn, BM daily   Management - Toilet Q2  Skin - Turn Q2  Pain - Tylenol PRN  DVT PPX - Lovenox  Diet - reg    Continue comprehensive acute rehab program consisting of 3hrs/day of OT/PT and SLP.
CHIEF COMPLAINT: Improved balance and functional deficits s/p CVA.       HISTORY OF PRESENT ILLNESS  75 yo female with PMH of peripheral vertigo (intermittent for past two years) presented to ED on 3/21 with complaints of unsteady gait and left sided numbness since 3/17. 'Off balance' when walking. MR 3/21 brain showed CVAs of right medial temporal lobe, right occipital cortex, right posterior limb of internal capsule and left centrum semiovale and possible midportion of the medulla which may be related to cardioembolic source versus hypercoagulability. Moderate atrophy and small vessel white matter ischemic changes. CTA showed atheromatous irregularity along the carotid siphons/carotid bulbs bilaterally. Otherwise, no flow-limiting stenosis or occlusion. BINH showed severe atheroma of aortic arch, small PFO, no thrombus in PITA, EF 55%. US LEs neg on 3/23. A1C 5.9% and LDL 67. ASA/Plavix started. Cardio eval requested for PAT on 3/22. ILR plan as outpt. Low dose toprol started for rate control. Cleared for d/c to AR on 3/24. (24 Mar 2020 13:14)      PAST MEDICAL & SURGICAL HISTORY:  No pertinent past medical history  No significant past surgical history        REVIEW OF SYMPTOMS  [X] Constitutional WNL          [X] Resp WNL           [X] GI WNL                          [X]  WNL                   [X] Heme WNL              [X] Endo WNL                     [X] Skin WNL                 [X] MSK WNL                       [X] Cognitive WNL        [X] Psych WNL      VITALS  Vital Signs Last 24 Hrs  T(C): 36.5 (30 Mar 2020 20:49), Max: 37.1 (30 Mar 2020 20:48)  T(F): 97.7 (30 Mar 2020 20:49), Max: 98.8 (30 Mar 2020 20:48)  HR: 53 (31 Mar 2020 06:49) (51 - 60)  BP: 143/73 (31 Mar 2020 06:49) (131/81 - 151/90)  BP(mean): --  RR: 13 (30 Mar 2020 20:49) (13 - 15)  SpO2: 97% (30 Mar 2020 20:49) (95% - 99%)      PHYSICAL EXAM  Constitutional - NAD, Comfortable  HEENT - NCAT, EOMI  Neck - Supple, No limited ROM  Chest - breathes comfortably  Cardiovascular - no chest pain  Abdomen - BS+, Soft, NTND  Extremities - No C/C/E, No calf tenderness   Skin-no rash  Wounds-none      Neurologic Exam - A&Ox4, no aphasia, improved left hemiparesis and coordination.                      Psychiatric - Mood stable, Affect WNL         FUNCTIONAL PROGRESS  Gait - supervision  ADLs - supervision   Transfers - supervision  Functional transfer - supervision    RECENT LABS                        13.7   7.63  )-----------( 290      ( 30 Mar 2020 10:47 )             39.4     03-30    137  |  103  |  16  ----------------------------<  82  4.3   |  28  |  0.72    Ca    9.1      30 Mar 2020 10:47    TPro  6.2  /  Alb  3.2<L>  /  TBili  0.2  /  DBili  x   /  AST  22  /  ALT  39  /  AlkPhos  60  03-30      LIVER FUNCTIONS - ( 30 Mar 2020 10:47 )  Alb: 3.2 g/dL / Pro: 6.2 g/dL / ALK PHOS: 60 U/L / ALT: 39 U/L / AST: 22 U/L / GGT: x             Direct LDL: 67 mg/dL (03-23-20 @ 09:00)    Hemoglobin A1C, Whole Blood: 5.9 % (03-22-20 @ 09:48)              RADIOLOGY/OTHER RESULTS      CURRENT MEDICATIONS  MEDICATIONS  (STANDING):  aspirin enteric coated 81 milliGRAM(s) Oral daily  atorvastatin 10 milliGRAM(s) Oral at bedtime  clopidogrel Tablet 75 milliGRAM(s) Oral daily  enoxaparin Injectable 40 milliGRAM(s) SubCutaneous daily  famotidine    Tablet 20 milliGRAM(s) Oral daily  lisinopril 5 milliGRAM(s) Oral daily  metoprolol tartrate 12.5 milliGRAM(s) Oral two times a day    MEDICATIONS  (PRN):      ASSESSMENT & PLAN    Completed comprehensive acute rehab program consisting of 3hrs/day of OT/PT and SLP. Home with home care today.
CHIEF COMPLAINT: Loose BM x2 overnight. Poor balance.       HISTORY OF PRESENT ILLNESS  75 yo female with PMH of peripheral vertigo (intermittent for past two years) presented to ED on 3/21 with complaints of unsteady gait and left sided numbness since 3/17. 'Off balance' when walking. MR 3/21 brain showed CVAs of right medial temporal lobe, right occipital cortex, right posterior limb of internal capsule and left centrum semiovale and possible midportion of the medulla which may be related to cardioembolic source versus hypercoagulability. Moderate atrophy and small vessel white matter ischemic changes. CTA showed atheromatous irregularity along the carotid siphons/carotid bulbs bilaterally. Otherwise, no flow-limiting stenosis or occlusion. BINH showed severe atheroma of aortic arch, small PFO, no thrombus in PITA, EF 55%. US LEs neg on 3/23. A1C 5.9% and LDL 67. ASA/Plavix started. Cardio eval requested for PAT on 3/22. ILR plan as outpt. Low dose toprol started for rate control. Cleared for d/c to AR on 3/24. (24 Mar 2020 13:14)      PAST MEDICAL & SURGICAL HISTORY:  No pertinent past medical history  No significant past surgical history       REVIEW OF SYMPTOMS  [X] Constitutional WNL         [X] Resp WNL                 [X]  WNL                   [X] Heme WNL              [X] Endo WNL                     [X] Skin WNL                 [X] MSK WNL                      [X] Cognitive WNL        [X] Psych WNL      VITALS  Vital Signs Last 24 Hrs  T(C): 36.7 (27 Mar 2020 09:11), Max: 37.1 (26 Mar 2020 18:26)  T(F): 98 (27 Mar 2020 09:11), Max: 98.7 (26 Mar 2020 18:26)  HR: 54 (27 Mar 2020 09:11) (54 - 58)  BP: 140/75 (27 Mar 2020 09:11) (113/67 - 156/79)  BP(mean): --  RR: 14 (27 Mar 2020 09:11) (14 - 15)  SpO2: 98% (27 Mar 2020 09:11) (96% - 98%)      PHYSICAL EXAM  Constitutional - NAD, Comfortable  HEENT - NCAT, EOMI  Neck - Supple, No limited ROM  Chest - CTA bilaterally  Cardiovascular - RRR, S1S2, No murmurs  Abdomen - BS+, Soft, NTND  Extremities - No C/C/E, No calf tenderness   Skin-no rash    Neurologic Exam -  no new focal deficits, AOx4, impaired balance, left sided weakness           FUNCTIONAL PROGRESS  Gait - 40ft min A  ADLs - CG/supervision  Transfers -CG  Functional transfer - CG      RECENT LABS                Direct LDL: 67 mg/dL (03-23-20 @ 09:00)    Hemoglobin A1C, Whole Blood: 5.9 % (03-22-20 @ 09:48)              RADIOLOGY/OTHER RESULTS      CURRENT MEDICATIONS  MEDICATIONS  (STANDING):  aspirin enteric coated 81 milliGRAM(s) Oral daily  atorvastatin 10 milliGRAM(s) Oral at bedtime  clopidogrel Tablet 75 milliGRAM(s) Oral daily  enoxaparin Injectable 40 milliGRAM(s) SubCutaneous daily  famotidine    Tablet 20 milliGRAM(s) Oral daily  lisinopril 5 milliGRAM(s) Oral daily  metoprolol tartrate 25 milliGRAM(s) Oral two times a day    MEDICATIONS  (PRN):      ASSESSMENT & PLAN      GI/Bowel Management - hold for loose BM   Management - Toilet Q2  Skin - Turn Q2  Pain - Tylenol PRN  DVT PPX - Lovenox  Diet - reg    Continue comprehensive acute rehab program consisting of 3hrs/day of OT/PT and SLP.
CHIEF COMPLAINT: Poor balance after stroke.       HISTORY OF PRESENT ILLNESS  73 yo female with PMH of peripheral vertigo (intermittent for past two years) presented to ED on 3/21 with complaints of unsteady gait and left sided numbness since 3/17. 'Off balance' when walking. MR 3/21 brain showed CVAs of right medial temporal lobe, right occipital cortex, right posterior limb of internal capsule and left centrum semiovale and possible midportion of the medulla which may be related to cardioembolic source versus hypercoagulability. Moderate atrophy and small vessel white matter ischemic changes. CTA showed atheromatous irregularity along the carotid siphons/carotid bulbs bilaterally. Otherwise, no flow-limiting stenosis or occlusion. BINH showed severe atheroma of aortic arch, small PFO, no thrombus in PITA, EF 55%. US LEs neg on 3/23. A1C 5.9% and LDL 67. ASA/Plavix started. Cardio eval requested for PAT on 3/22. ILR plan as outpt. Low dose toprol started for rate control. Cleared for d/c to AR on 3/24. (24 Mar 2020 13:14)      PAST MEDICAL & SURGICAL HISTORY:  No pertinent past medical history  No significant past surgical history       REVIEW OF SYMPTOMS  [X] Constitutional WNL     [X] Cardio WNL            [X] Resp WNL           [X] GI WNL                          [X]  WNL                   [X] Heme WNL              [X] Endo WNL                     [X] Skin WNL                 [X] MSK WNL            [X] Neuro WNL                   [X] Cognitive WNL        [X] Psych WNL      VITALS  Vital Signs Last 24 Hrs  T(C): 36.7 (25 Mar 2020 20:57), Max: 36.7 (25 Mar 2020 20:57)  T(F): 98.1 (25 Mar 2020 20:57), Max: 98.1 (25 Mar 2020 20:57)  HR: 55 (26 Mar 2020 05:45) (48 - 55)  BP: 127/75 (26 Mar 2020 05:45) (119/65 - 139/73)  BP(mean): --  RR: 15 (26 Mar 2020 05:45) (15 - 15)  SpO2: 97% (26 Mar 2020 05:45) (95% - 97%)      PHYSICAL EXAM  Constitutional - NAD, Comfortable  HEENT - NCAT, EOMI  Neck - Supple, No limited ROM  Chest - CTA bilaterally  Cardiovascular - RRR, S1S2, No murmurs  Abdomen - BS+, Soft, NTND  Extremities - No C/C/E, No calf tenderness   Skin-no rash  Neurologic Exam -  no new focal deficits            FUNCTIONAL PROGRESS  Gait - 40ft min A  ADLs - CG/supervision  Transfers -CG  Functional transfer - CG    RECENT LABS                        13.4   6.08  )-----------( 272      ( 25 Mar 2020 06:57 )             38.9     03-25    138  |  104  |  18  ----------------------------<  99  4.2   |  26  |  0.64    Ca    8.9      25 Mar 2020 06:57    TPro  6.0  /  Alb  3.0<L>  /  TBili  0.3  /  DBili  x   /  AST  27  /  ALT  31  /  AlkPhos  64  03-25      LIVER FUNCTIONS - ( 25 Mar 2020 06:57 )  Alb: 3.0 g/dL / Pro: 6.0 g/dL / ALK PHOS: 64 U/L / ALT: 31 U/L / AST: 27 U/L / GGT: x             Direct LDL: 67 mg/dL (03-23-20 @ 09:00)    Hemoglobin A1C, Whole Blood: 5.9 % (03-22-20 @ 09:48)              RADIOLOGY/OTHER RESULTS      CURRENT MEDICATIONS  MEDICATIONS  (STANDING):  aspirin enteric coated 81 milliGRAM(s) Oral daily  atorvastatin 10 milliGRAM(s) Oral at bedtime  clopidogrel Tablet 75 milliGRAM(s) Oral daily  enoxaparin Injectable 40 milliGRAM(s) SubCutaneous daily  lisinopril 5 milliGRAM(s) Oral daily  metoprolol tartrate 25 milliGRAM(s) Oral two times a day    MEDICATIONS  (PRN):  famotidine    Tablet 20 milliGRAM(s) Oral daily PRN Dyspepsia      ASSESSMENT & PLAN          GI/Bowel Management - pepcid daily.   Management - Toilet Q2  Skin - Turn Q2  Pain - Tylenol PRN  DVT PPX - Lovenox    Continue comprehensive acute rehab program consisting of 3hrs/day of OT/PT and SLP.
CHIEF COMPLAINT: no acute events overnight, no new complaints.      HISTORY OF PRESENT ILLNESS    75 yo female with PMH of peripheral vertigo (intermittent for past two years) presented to ED on 3/21 with complaints of unsteady gait and left sided numbness since 3/17. 'Off balance' when walking. MR 3/21 brain showed CVAs of right medial temporal lobe, right occipital cortex, right posterior limb of internal capsule and left centrum semiovale and possible midportion of the medulla which may be related to cardioembolic source versus hypercoagulability. Moderate atrophy and small vessel white matter ischemic changes. CTA showed atheromatous irregularity along the carotid siphons/carotid bulbs bilaterally. Otherwise, no flow-limiting stenosis or occlusion. BINH showed severe atheroma of aortic arch, small PFO, no thrombus in PITA, EF 55%. US LEs neg on 3/23. A1C 5.9% and LDL 67. ASA/Plavix started. Cardio eval requested for PAT on 3/22. ILR plan as outpt. Low dose toprol started for rate control. Cleared for d/c to AR on 3/24. (24 Mar 2020 13:14)        PAST MEDICAL & SURGICAL HISTORY:  No pertinent past medical history  No significant past surgical history        VITALS  Vital Signs Last 24 Hrs  T(C): 36.4 (25 Mar 2020 07:58), Max: 36.8 (24 Mar 2020 14:55)  T(F): 97.6 (25 Mar 2020 07:58), Max: 98.3 (24 Mar 2020 14:55)  HR: 47 (25 Mar 2020 07:58) (47 - 55)  BP: 145/80 (25 Mar 2020 07:58) (117/59 - 145/80)  BP(mean): --  RR: 14 (25 Mar 2020 07:58) (14 - 15)  SpO2: 98% (25 Mar 2020 07:58) (95% - 98%)      PHYSICAL EXAM  Constitutional - NAD, Comfortable  HEENT - NCAT, EOMI  Neck - Supple, No limited ROM  Chest - CTA bilaterally  Cardiovascular - RRR, S1S2, No murmurs  Abdomen - BS+, Soft, NTND  Extremities - No C/C/E, No calf tenderness   Skin-no rash  Neurologic Exam -  no new focal deficits                      RECENT LABS                        13.4   6.08  )-----------( 272      ( 25 Mar 2020 06:57 )             38.9     03-25    138  |  104  |  18  ----------------------------<  99  4.2   |  26  |  0.64    Ca    8.9      25 Mar 2020 06:57    TPro  6.0  /  Alb  3.0<L>  /  TBili  0.3  /  DBili  x   /  AST  27  /  ALT  31  /  AlkPhos  64  03-25      LIVER FUNCTIONS - ( 25 Mar 2020 06:57 )  Alb: 3.0 g/dL / Pro: 6.0 g/dL / ALK PHOS: 64 U/L / ALT: 31 U/L / AST: 27 U/L / GGT: x             Direct LDL: 67 mg/dL (03-23-20 @ 09:00)    Hemoglobin A1C, Whole Blood: 5.9 % (03-22-20 @ 09:48)            CURRENT MEDICATIONS  MEDICATIONS  (STANDING):  aspirin enteric coated 81 milliGRAM(s) Oral daily  atorvastatin 10 milliGRAM(s) Oral at bedtime  clopidogrel Tablet 75 milliGRAM(s) Oral daily  enoxaparin Injectable 40 milliGRAM(s) SubCutaneous daily  lisinopril 5 milliGRAM(s) Oral daily  metoprolol tartrate 25 milliGRAM(s) Oral two times a day    MEDICATIONS  (PRN):  famotidine    Tablet 20 milliGRAM(s) Oral daily PRN Dyspepsia
Chief Complaint:     73 yo female with PMH of peripheral vertigo (intermittent for past two years) presented to ED on 3/21 with complaints of unsteady gait and left sided numbness since 3/17. 'Off balance' when walking. MR 3/21 brain showed CVAs of right medial temporal lobe, right occipital cortex, right posterior limb of internal capsule and left centrum semiovale and possible midportion of the medulla which may be related to cardioembolic source versus hypercoagulability. Moderate atrophy and small vessel white matter ischemic changes. CTA showed atheromatous irregularity along the carotid siphons/carotid bulbs bilaterally. Otherwise, no flow-limiting stenosis or occlusion. BINH showed severe atheroma of aortic arch, small PFO, no thrombus in PITA, EF 55%. US LEs neg on 3/23. A1C 5.9% and LDL 67. ASA/Plavix started. Cardio eval requested for PAT on 3/22. ILR plan as outpt. Low dose toprol started for rate control. Cleared for d/c to AR on 3/24. On rehab patient is noted to have bradycardia and a cardio consult is requested     HPI:    PMH:   No pertinent past medical history    PSH:   No significant past surgical history    Family History:  FAMILY HISTORY:      Social History:  Smoking:  Alcohol:  Drugs:    Allergies:  No Known Allergies      Medications:  aspirin enteric coated 81 milliGRAM(s) Oral daily  atorvastatin 10 milliGRAM(s) Oral at bedtime  clopidogrel Tablet 75 milliGRAM(s) Oral daily  enoxaparin Injectable 40 milliGRAM(s) SubCutaneous daily  famotidine    Tablet 20 milliGRAM(s) Oral daily  lisinopril 5 milliGRAM(s) Oral daily  metoprolol tartrate 25 milliGRAM(s) Oral two times a day  polyethylene glycol 3350 17 Gram(s) Oral at bedtime      REVIEW OF SYSTEMS:  CONSTITUTIONAL: No fever, weight loss, or fatigue  EYES: No eye pain, visual disturbances, or discharge  ENMT:  No difficulty hearing, tinnitus, vertigo; No sinus or throat pain  NECK: No pain or stiffness  BREASTS: No pain, masses, or nipple discharge  RESPIRATORY: No cough, wheezing, chills or hemoptysis; No shortness of breath  CARDIOVASCULAR: No chest pain, palpitations, dizziness, or leg swelling  GASTROINTESTINAL: No abdominal or epigastric pain. No nausea, vomiting, or hematemesis; No diarrhea or constipation. No melena or hematochezia.  GENITOURINARY: No dysuria, frequency, hematuria, or incontinence  NEUROLOGICAL: No headaches, memory loss, loss of strength, numbness, or tremors  SKIN: No itching, burning, rashes, or lesions   LYMPH NODES: No enlarged glands  ENDOCRINE: No heat or cold intolerance; No hair loss  MUSCULOSKELETAL: No joint pain or swelling; No muscle, back, or extremity pain  PSYCHIATRIC: No depression, anxiety, mood swings, or difficulty sleeping  HEME/LYMPH: No easy bruising, or bleeding gums  ALLERY AND IMMUNOLOGIC: No hives or eczema    Physical Exam:  T(C): 36.7 (03-25-20 @ 20:57), Max: 36.7 (03-25-20 @ 20:57)  HR: 55 (03-26-20 @ 05:45) (48 - 55)  BP: 127/75 (03-26-20 @ 05:45) (119/65 - 139/73)  RR: 15 (03-26-20 @ 05:45) (15 - 15)  SpO2: 97% (03-26-20 @ 05:45) (95% - 97%)  Wt(kg): --    GENERAL: NAD, well-groomed, well-developed  HEAD:  Atraumatic, Normocephalic  EYES: EOMI, conjunctiva and sclera clear  ENT: Moist mucous membranes,  NECK: Supple, No JVD, no bruits  CHEST/LUNG: Clear to percussion bilaterally; No rales, rhonchi, wheezing, or rubs  HEART: Regular rate and rhythm; No murmurs, rubs, or gallops PMI non displaced.  ABDOMEN: Soft, Nontender, Nondistended; Bowel sounds present  EXTREMITIES:  2+ Peripheral Pulses, No clubbing, cyanosis, or edema  SKIN: No rashes or lesions  NERVOUS SYSTEM:  Cranial Nerves II-XII intact     Cardiovascular Diagnostic Testing:  ECG:    < from: 12 Lead ECG (03.20.20 @ 22:08) >  Diagnosis Line SINUS BRADYCARDIA  POSSIBLE LEFT ATRIAL ENLARGEMENT  CANNOT RULE OUT ANTERIOR INFARCT , AGE UNDETERMINED  ABNORMAL ECG    Confirmed by Gilberto Mason ([id number]) on 3/21/2020 12:29:05 PM    < end of copied text >      ECHO:    < from: BINH w/o TTE (w/3D Echo) (03.23.20 @ 12:58) >  Conclusions:  1. Severe irregular atheroma noted in aortic arch ( 7mm )  /descending aorta.  2. No left atrial or left atrial appendage thrombus.  3. Normal left ventricular systolic function. EF 55%  4. Normal right ventricular size and function.  5. Agitated saline injection demonstrates evidence of a  very smallpatent foramen ovale. No flow is seen with  color Doppler.  ------------------------------------------------------------------------  Confirmed on  3/23/2020 - 13:53:47 by Hyacinth Gamez M.D.  ------------------------------------------------------------------------    < end of copied text >      Labs:                        13.4   6.08  )-----------( 272      ( 25 Mar 2020 06:57 )             38.9     03-25    138  |  104  |  18  ----------------------------<  99  4.2   |  26  |  0.64    Ca    8.9      25 Mar 2020 06:57    TPro  6.0  /  Alb  3.0<L>  /  TBili  0.3  /  DBili  x   /  AST  27  /  ALT  31  /  AlkPhos  64  03-25        Hemoglobin A1C, Whole Blood: 5.9 % (03-22 @ 09:48)    Thyroid Stimulating Hormone, Serum: 0.98 uIU/mL (03-22 @ 17:24)      Imaging:
LATE ENTRY _ pt seen 3/27/2020 @ 240PM  CC: follow up stroke  Continues to improve with therapies and has no new complaints    MEDICATIONS  (STANDING):  aspirin enteric coated 81 milliGRAM(s) Oral daily  atorvastatin 10 milliGRAM(s) Oral at bedtime  clopidogrel Tablet 75 milliGRAM(s) Oral daily  enoxaparin Injectable 40 milliGRAM(s) SubCutaneous daily  famotidine    Tablet 20 milliGRAM(s) Oral daily  lisinopril 5 milliGRAM(s) Oral daily  metoprolol tartrate 25 milliGRAM(s) Oral two times a day    MEDICATIONS  (PRN):    Vital Signs Last 24 Hrs  T(C): 36.7 (27 Mar 2020 21:29), Max: 36.7 (27 Mar 2020 09:11)  T(F): 98.1 (27 Mar 2020 21:29), Max: 98.1 (27 Mar 2020 21:29)  HR: 54 (28 Mar 2020 05:55) (54 - 55)  BP: 144/84 (28 Mar 2020 05:55) (124/79 - 150/84)  BP(mean): --  RR: 14 (27 Mar 2020 21:29) (14 - 15)  SpO2: 97% (27 Mar 2020 21:29) (97% - 98%)    PHYSICAL EXAM    GEN Alert and oriented, pleasant  HEENT NCAT EOMI MMM  Neck supple no JVD  Lungs clear  cor RSR  abd soft NT ND  Extr no edema  Neuro stable
Subjective: No new complaints or overnight issues      REVIEW OF SYSTEMS  Pertinent in last 24 h: Neurological deficits    VITALS  T(C): 36.4 (03-28-20 @ 08:32), Max: 36.7 (03-27-20 @ 21:29)  HR: 52 (03-28-20 @ 08:32) (52 - 55)  BP: 135/74 (03-28-20 @ 08:32) (124/79 - 150/84)  RR: 14 (03-28-20 @ 08:32) (14 - 15)  SpO2: 98% (03-28-20 @ 08:32) (97% - 98%)  Wt(kg): --    Physical Exam:  Constitutional - NAD, Comfortable  HEENT - NCAT, EOMI  Chest - CTA bilaterally,  Cardiovascular - RRR, S1S2,   Abdomen - BS+, Soft, NTND  Extremities - No edema, No calf tenderness   Neurologic Exam -    Stable                Cognitive - Awake, Alert, AAO to self, place, date, year, situation     Cranial Nerves - CN 2-12 intact     Motor - no new deficit --left sided weakness      Psychiatric - Mood stable, Affect WNL  -    RECENT LABS/IMAGING                  MEDICATIONS   aspirin enteric coated 81 milliGRAM(s) daily  atorvastatin 10 milliGRAM(s) at bedtime  clopidogrel Tablet 75 milliGRAM(s) daily  enoxaparin Injectable 40 milliGRAM(s) daily  famotidine    Tablet 20 milliGRAM(s) daily  lisinopril 5 milliGRAM(s) daily  metoprolol tartrate 25 milliGRAM(s) two times a day      --------------------------------------------------------------------
Subjective: No new complaints or overnight issues      REVIEW OF SYSTEMS  Pertinent in last 24 h: Neurological deficits    VITALS  T(C): 36.7 (03-28-20 @ 20:50), Max: 36.7 (03-28-20 @ 20:50)  HR: 53 (03-29-20 @ 05:15) (52 - 57)  BP: 137/76 (03-29-20 @ 05:15) (119/71 - 137/76)  RR: 96 (03-28-20 @ 20:50) (14 - 96)  SpO2: 97% (03-28-20 @ 18:04) (97% - 97%)  Wt(kg): --    Physical Exam:  -Constitutional - NAD, Comfortable  HEENT - NCAT, EOMI  Chest - CTA bilaterally,  Cardiovascular - RRR, S1S2,   Abdomen - BS+, Soft, NTND  Extremities - No edema, No calf tenderness   Neurologic Exam -    Stable                Cognitive - Awake, Alert, AAO to self, place, date, year, situation     Cranial Nerves - CN 2-12 intact     Motor - no new deficit --left sided weakness      Psychiatric - Mood stable, Affect WNL      RECENT LABS/IMAGING                  MEDICATIONS   aspirin enteric coated 81 milliGRAM(s) daily  atorvastatin 10 milliGRAM(s) at bedtime  clopidogrel Tablet 75 milliGRAM(s) daily  enoxaparin Injectable 40 milliGRAM(s) daily  famotidine    Tablet 20 milliGRAM(s) daily  lisinopril 5 milliGRAM(s) daily  metoprolol tartrate 25 milliGRAM(s) two times a day      --------------------------------------------------------------------
CC: follow up stroke    Scheduled for discharge and has no new complaints    MEDICATIONS  (STANDING):  aspirin enteric coated 81 milliGRAM(s) Oral daily  atorvastatin 10 milliGRAM(s) Oral at bedtime  clopidogrel Tablet 75 milliGRAM(s) Oral daily  enoxaparin Injectable 40 milliGRAM(s) SubCutaneous daily  famotidine    Tablet 20 milliGRAM(s) Oral daily  lisinopril 5 milliGRAM(s) Oral daily  metoprolol tartrate 12.5 milliGRAM(s) Oral two times a day    MEDICATIONS  (PRN):    Vital Signs Last 24 Hrs  T(C): 36.5 (30 Mar 2020 20:49), Max: 37.1 (30 Mar 2020 20:48)  T(F): 97.7 (30 Mar 2020 20:49), Max: 98.8 (30 Mar 2020 20:48)  HR: 53 (31 Mar 2020 06:49) (51 - 60)  BP: 143/73 (31 Mar 2020 06:49) (131/81 - 151/90)  BP(mean): --  RR: 13 (30 Mar 2020 20:49) (13 - 15)  SpO2: 97% (30 Mar 2020 20:49) (95% - 99%)    Physical exam:  NAD  Alert and oriented   Lungs clear  cor regular s1s2  abd soft NT ND  No edema  Neuro stable

## 2020-03-31 NOTE — DISCHARGE NOTE NURSING/CASE MANAGEMENT/SOCIAL WORK - PATIENT PORTAL LINK FT
You can access the FollowMyHealth Patient Portal offered by [facility] by registering at the following website: [email or web address]. By joining [employer]’s FollowMyHealth portal, you will also be able to view your health information using other applications (apps) compatible with our system.

## 2020-03-31 NOTE — PROGRESS NOTE ADULT - ASSESSMENT
FRANCISCO NEUMANN is a 73yo Female s/p bilateral CVAs, now with decreased functional mobility, gait instability and ADL impairments.    >CVA  -medically stable for discharge to home  -continue ASA, Plavix and Statin. Stressed importance of these for secondary prevention  -outpt. cardiology follow up for ILR. Pt will scheduled appt with Dr Tate  -follow up with PCP Dr David Ahn and to discuss with him a neurologist to follow with outpatient    >PAT  -continue Toprol,     >HTN: stable  -continue Lisinopril    >Smoking cessation  -outpatient smoking cessation referral by PCP advised pt to seek counseling once home    >DVTP  -continue enoxaparin    >Dispo: pt likely to be discharged mid week
FRANCISCO NEUMANN is a 73yo Female s/p bilateral CVAs, now with decreased functional mobility, gait instability and ADL impairments.    >CVA  -medically stable to continue comprehensive inpatient rehabilitation program of PT OT ST  -continue ASA, Plavix and Statin.   -outpt. cardiology follow up for ILR.    >PAT  -continue Toprol,     >HTN: stable  -continue Lisinopril    >Smoking cessation  -offered nicotine replacement pt declined states "I'm fine so far"  -outpatient smoking cessation referral once discharged    >DVTP  -continue enoxaparin
FRANCISCO NEUMANN is a 73yo Female s/p bilateral CVAs, now with decreased functional mobility, gait instability and ADL impairments.    COMORBIDITES/ACTIVE MEDICAL ISSUES     Gait Instability, ADL impairments and Functional impairments: continue Comprehensive Rehab Program of PT/OT/SLP. D/c plan home am.     #CVA  - ASA, Plavix and Statin for stroke ppx. GI ppx with Pepcid while on DAPT.  - Goal LDL<70  - complete stroke work up as outpatient, as per primary vascular neurology team (plan ILR)    #PAT  - hx tachycardia prior to admission.  - HR 40s-50s, Metoprolol held as per parameters.   - Cardiology in      #HTN  -Lisinopril  -Metoprolol      Pain Mgmt - Tylenol PRN  GI/Bowel Mgmt - Miralax held for loose BM overnight.  /Bladder Mgmt - Voiding independently
FRANCISCO NEUMANN is a 75yo Female s/p bilateral CVAs, now with decreased functional mobility, gait instability and ADL impairments.      COMORBIDITES/ACTIVE MEDICAL ISSUES     Gait Instability, ADL impairments and Functional impairments: continue Comprehensive Rehab Program of PT/OT     #CVA  -ASA, Plavix and Statin for stroke ppx. GI ppx with Pepcid while on DAPT.  - Goal LDL<70  - complete stroke work up as outpatient, as per primary vascular neurology team (plan ILR)    #PAT  - hx tachycardia prior to admission.  - HR 40s-50s, Metoprolol held as per parameters.   - will ask Cardiology to evaluate  - ? Holter Monitor       #HTN  -Lisinopril  -Metoprolol      Pain Mgmt - Tylenol PRN  GI/Bowel Mgmt - Miralax PRN  /Bladder Mgmt - Voiding independently
FRANCISCO NEUMANN is a 75yo Female s/p bilateral CVAs, now with decreased functional mobility, gait instability and ADL impairments.      COMORBIDITES/ACTIVE MEDICAL ISSUES     Gait Instability, ADL impairments and Functional impairments: start Comprehensive Rehab Program of PT/OT     #CVA  -ASA, Plavix and Statin americo stroke ppx  - Goal LDL  - complete stroke work up as outpatient, as per primary vascular neurology team ( ILR)    #PAT  -continue Toprol  - monitor VS, HR 47 today  - will ask Cardiology to evaluate  - ? Holter Monitor       #HTN  -Lisinopril  -Metoprolol      Pain Mgmt - Tylenol PRN  GI/Bowel Mgmt - Colace, Senna,  Miralax PRN  /Bladder Mgmt - Voiding independently, PVR    Case discussed with Medicine, input appreciated
FRANCISCO NEUMANN is a 75yo Female s/p bilateral CVAs, now with decreased functional mobility, gait instability and ADL impairments.    >CVA  -medically stable to continue comprehensive inpatient rehabilitation program of PT OT ST  -continue ASA, Plavix and Statin.   -outpt. cardiology follow up for ILR.    >PAT  -continue Toprol,     >HTN: stable  -continue Lisinopril    >Smoking cessation  -offered nicotine replacement pt declined states "I'm fine so far"  -outpatient smoking cessation referral once discharged    >DVTP  -continue enoxaparin
FRANCISCO NEUMANN is a 75yo Female s/p bilateral CVAs, now with decreased functional mobility, gait instability and ADL impairments.    >CVA  -medically stable to continue comprehensive inpatient rehabilitation program of PT OT ST  -continue ASA, Plavix and Statin.   -outpt. cardiology follow up for ILR.    >PAT  -continue Toprol,     >HTN: stable  -continue Lisinopril    >Smoking cessation  -outpatient smoking cessation referral once discharged    >DVTP  -continue enoxaparin    >Dispo: pt likely to be discharged mid week
FRANCISCO NEUMANN is a 75yo Female s/p bilateral CVAs, now with decreased functional mobility, gait instability and ADL impairments.    COMORBIDITES/ACTIVE MEDICAL ISSUES     Gait Instability, ADL impairments and Functional impairments: completed Comprehensive Rehab Program of PT/OT/SLP. D/c plan home with home care today. Excellent progress to date, performs activities with supervision/mod I.     #CVA  - ASA, Plavix and Statin for stroke ppx. GI ppx with Pepcid while on DAPT.  - Goal LDL<70  - complete stroke work up as outpatient, as per primary vascular neurology team (plan ILR)    #PAT  - hx tachycardia prior to admission.  - HR 40s-50s, Metoprolol held as per parameters.   - Cardiology follow up outpt.      #HTN  -Lisinopril  -Metoprolol with parameters.
FRANCISCO NEUMANN is a 75yo Female s/p bilateral CVAs, now with decreased functional mobility, gait instability and ADL impairments.    COMORBIDITES/ACTIVE MEDICAL ISSUES     Gait Instability, ADL impairments and Functional impairments: continue Comprehensive Rehab Program of PT/OT/SLP     #CVA  - ASA, Plavix and Statin for stroke ppx. GI ppx with Pepcid while on DAPT.  - Goal LDL<70  - complete stroke work up as outpatient, as per primary vascular neurology team (plan ILR)    #PAT  - hx tachycardia prior to admission.  - HR 40s-50s, Metoprolol held as per parameters.   - Cardiology in      #HTN  -Lisinopril  -Metoprolol      Pain Mgmt - Tylenol PRN  GI/Bowel Mgmt - Miralax held for loose BM overnight.  /Bladder Mgmt - Voiding independently
FRANCISCO NEUMANN is a 75yo Female s/p bilateral CVAs, now with decreased functional mobility, gait instability and ADL impairments.    Stable.  No medication or plan changes    COMORBIDITES/ACTIVE MEDICAL ISSUES     Gait Instability, ADL impairments and Functional impairments: continue Comprehensive Rehab Program of PT/OT/SLP     #CVA  - ASA, Plavix and Statin for stroke ppx. GI ppx with Pepcid while on DAPT.  - Goal LDL<70  - complete stroke work up as outpatient, as per primary vascular neurology team (plan ILR)          #HTN  -Lisinopril  -Metoprolol      Pain Mgmt - Tylenol PRN  GI/Bowel Mgmt - Miralax held for loose BM   /Bladder Mgmt - Voiding independently
FRANCISCO NEUMANN is a 75yo Female s/p bilateral CVAs, now with decreased functional mobility, gait instability and ADL impairments.    Stable.  No medication or plan changes    COMORBIDITES/ACTIVE MEDICAL ISSUES     Gait Instability, ADL impairments and Functional impairments: continue Comprehensive Rehab Program of PT/OT/SLP     #CVA  - ASA, Plavix and Statin for stroke ppx. GI ppx with Pepcid while on DAPT.  - Goal LDL<70  - complete stroke work up as outpatient, as per primary vascular neurology team (plan ILR)          #HTN  -Lisinopril  -Metoprolol--has been held due to bradycardia-- BP stable.       Pain Mgmt - Tylenol PRN  GI/Bowel Mgmt - Miralax held for loose BM   /Bladder Mgmt - Voiding independently

## 2020-04-01 PROCEDURE — 97535 SELF CARE MNGMENT TRAINING: CPT

## 2020-04-01 PROCEDURE — 97167 OT EVAL HIGH COMPLEX 60 MIN: CPT

## 2020-04-01 PROCEDURE — 97163 PT EVAL HIGH COMPLEX 45 MIN: CPT

## 2020-04-01 PROCEDURE — 85027 COMPLETE CBC AUTOMATED: CPT

## 2020-04-01 PROCEDURE — 92523 SPEECH SOUND LANG COMPREHEN: CPT

## 2020-04-01 PROCEDURE — 92610 EVALUATE SWALLOWING FUNCTION: CPT

## 2020-04-01 PROCEDURE — 97110 THERAPEUTIC EXERCISES: CPT

## 2020-04-01 PROCEDURE — 97116 GAIT TRAINING THERAPY: CPT

## 2020-04-01 PROCEDURE — 80053 COMPREHEN METABOLIC PANEL: CPT

## 2020-04-01 PROCEDURE — 97530 THERAPEUTIC ACTIVITIES: CPT

## 2020-04-03 ENCOUNTER — APPOINTMENT (OUTPATIENT)
Dept: INTERNAL MEDICINE | Facility: CLINIC | Age: 75
End: 2020-04-03
Payer: MEDICARE

## 2020-04-03 PROCEDURE — 99443: CPT

## 2020-04-03 PROCEDURE — G2012 BRIEF CHECK IN BY MD/QHP: CPT

## 2020-04-03 NOTE — HISTORY OF PRESENT ILLNESS
[Post-hospitalization from ___ Hospital] : Post-hospitalization from [unfilled] Hospital [Discharge Summary] : discharge summary [Pertinent Labs] : pertinent labs [Discharge Med List] : discharge medication list [Med Reconciliation] : medication reconciliation has been completed [Patient Contacted By: ____] : and contacted by [unfilled] [FreeTextEntry2] : Hospital Course: \par Discharge Date	24-Mar-2020 \par Admission Date	21-Mar-2020 02:35 \par Reason for Admission	Ataxia \par Hospital Course	 \par 74F w/ PMH of peripheral vertigo (intermittent for past two years) presented on \par 03/21 with complaints of unsteady gait. Patient stated she woke up on Tuesday \par 3/17 and noticed difficulty ambulating. No room spinning sensation noted. \par States she was just off balance when walking. Swaying to both sides. Has not \par remitted since. She also notes that she felt "numb" in her LUE and LLE (now \par resolved). Denied changes in speech, vision, hearing, swallowing, voice \par quality, numbness/tingling, weakness. No previous history of CVA/TIA, seizures, \par migraines. \par \par Brain MRI (03/21/20): Multiple foci of diffusion restriction in the right \par medial temporal lobe, right occipital cortex, right posterior limb of internal \par capsule and left centrum semiovale and possible midportion of the medulla which \par may be related to cardioembolic source versus hypercoagulability. Moderate \par atrophy and small vessel white matter ischemic changes. \par \par  03/21/20: \par CT HEAD: No acute intracranial bleeding, mass effect, or shift. \par CTA BRAIN: Atheromatous irregularity along the carotid siphons bilaterally. \par Otherwise, no flow-limiting stenosis or occlusion. \par CTA NECK: Atheromatous calcification along the carotid bulbs bilaterally \par without significant stenosis. No flow limiting stenosis or occlusion. \par \par Impression: Multiple Vascular territory infarction - right medial temporal \par lobe, right occipital cortex, right posterior limb of internal capsule and left \par centrum semiovale and possible midportion of the medulla. Embolic stroke of \par undetermined source. \par \par TTE/BINH: \par Conclusions: \par 1. 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 \par 2. No left atrial or left atrial appendage thrombus. \par 3. Normal left ventricular systolic function. EF 55% \par 4. Normal right ventricular size and function. \par 5. Agitated saline injection demonstrates evidence of a \par very small  patent foramen ovale. No flow is seen with \par color Doppler. \par \par LE Doppler: No evidence of deep venous thrombosis in either lower extremity. \par \par Patient discharged on ASA and plavix for 3 months (in setting of atheroma) and \par Lipitor for stroke prevention. \par \par ILR to placed as outpatient by Dr. Abarca to screen for occult arrythmia. \par \par Patient evaluated by PT/OT and was recommended Acute Rehab. Patient stable for \par discharge, \par \par \par \par Med Reconciliation: \par Medication Reconciliation Status	Admission Reconciliation is Completed \par Discharge Reconciliation is Completed \par Discharge Medications	aspirin 81 mg oral delayed release tablet: 1 tab(s) \par orally once a day \par atorvastatin 10 mg oral tablet: 1 tab(s) orally once a day (at bedtime) \par clopidogrel 75 mg oral tablet: 1 tab(s) orally once a day for 3 months \par lisinopril 5 mg oral tablet: 1 tab(s) orally once a day \par meclizine 25 mg oral tablet: 1 tab(s) orally 3 times a day, As Needed \par metoprolol succinate 25 mg oral tablet, extended release: 1 tab(s) orally once \par a day \par \par Follow Up: \par Care Providers for Follow up (PCP/Outpatient Provider)	\par Demetris Dumont (DO) \par Neurology; Vascular Neurology \par [address] [facility] [address] \[place] [address] \par Phone: [phone] \par Fax: [phone] \par Follow Up Time: 1 month \par \par Eddie Tate\par Cardiovascular Disease \par Follow Up Time: 1 month \par \par Additional Scheduled Appointments	continue to follow with PCP for all routine \par screenings \par Diet Instructions	Regular \par Activity	Do not drive or operate machinery \par \par \par --------\par \par BP at home: this morning 144/82 HR 67\par Doing well at home - has good support from family\par Is able to maintain ADLs at this time; \par \par Verbal consent given on 04/03/2020  and  9:20AM by FRANCISCO DIBISCEGLIE, SELF (Relationship to the patient). \par

## 2020-04-03 NOTE — ASSESSMENT
[FreeTextEntry1] : 	 \par Pt is a 75 y/o Female w/ PMH of peripheral vertigo, newly diagnosis CVA. \par \par

## 2020-04-03 NOTE — PLAN
[FreeTextEntry1] : \par 1. TElephonic visit with PCP in 1 week\par 2. Cardiology: pt has an appt with Dr. Tate but needs ILR placed sooner than June 2020\par 3. Neurology: pt to make an appt with Dr. Dumont\par 4. Meds: has supplies\par 5. Home care: not needed at this time\par \par all questions answered, patient amenable to plan above \par Pt counseled to call MD if new symptoms develop or worsen.

## 2020-04-13 ENCOUNTER — APPOINTMENT (OUTPATIENT)
Dept: INTERNAL MEDICINE | Facility: CLINIC | Age: 75
End: 2020-04-13

## 2020-04-20 ENCOUNTER — APPOINTMENT (OUTPATIENT)
Dept: CARDIOLOGY | Facility: CLINIC | Age: 75
End: 2020-04-20
Payer: MEDICARE

## 2020-04-20 PROCEDURE — 99214 OFFICE O/P EST MOD 30 MIN: CPT | Mod: 95

## 2020-06-15 ENCOUNTER — NON-APPOINTMENT (OUTPATIENT)
Age: 75
End: 2020-06-15

## 2020-06-15 ENCOUNTER — APPOINTMENT (OUTPATIENT)
Dept: CARDIOLOGY | Facility: CLINIC | Age: 75
End: 2020-06-15
Payer: MEDICARE

## 2020-06-15 VITALS
DIASTOLIC BLOOD PRESSURE: 84 MMHG | SYSTOLIC BLOOD PRESSURE: 158 MMHG | HEART RATE: 54 BPM | BODY MASS INDEX: 22.21 KG/M2 | HEIGHT: 62 IN | OXYGEN SATURATION: 97 % | WEIGHT: 120.7 LBS

## 2020-06-15 PROCEDURE — 99214 OFFICE O/P EST MOD 30 MIN: CPT

## 2020-06-15 RX ORDER — METOPROLOL TARTRATE 25 MG/1
25 TABLET, FILM COATED ORAL
Qty: 90 | Refills: 3 | Status: COMPLETED | COMMUNITY
End: 2020-06-15

## 2020-06-15 NOTE — PHYSICAL EXAM
[General Appearance - Well Developed] : well developed [Normal Appearance] : normal appearance [General Appearance - Well Nourished] : well nourished [Well Groomed] : well groomed [No Deformities] : no deformities [Eyelids - No Xanthelasma] : the eyelids demonstrated no xanthelasmas [General Appearance - In No Acute Distress] : no acute distress [Normal Oral Mucosa] : normal oral mucosa [Normal Conjunctiva] : the conjunctiva exhibited no abnormalities [No Oral Pallor] : no oral pallor [No Oral Cyanosis] : no oral cyanosis [Normal Jugular Venous V Waves Present] : normal jugular venous V waves present [No Jugular Venous Dsouza A Waves] : no jugular venous dsouza A waves [Normal Jugular Venous A Waves Present] : normal jugular venous A waves present [Heart Rate And Rhythm] : heart rate and rhythm were normal [Murmurs] : no murmurs present [Heart Sounds] : normal S1 and S2 [Auscultation Breath Sounds / Voice Sounds] : lungs were clear to auscultation bilaterally [Exaggerated Use Of Accessory Muscles For Inspiration] : no accessory muscle use [Respiration, Rhythm And Depth] : normal respiratory rhythm and effort [Abdomen Tenderness] : non-tender [Abdomen Soft] : soft [Abnormal Walk] : normal gait [Abdomen Mass (___ Cm)] : no abdominal mass palpated [Gait - Sufficient For Exercise Testing] : the gait was sufficient for exercise testing [Petechial Hemorrhages (___cm)] : no petechial hemorrhages [Nail Clubbing] : no clubbing of the fingernails [Cyanosis, Localized] : no localized cyanosis [Skin Color & Pigmentation] : normal skin color and pigmentation [] : no rash [No Venous Stasis] : no venous stasis [Skin Lesions] : no skin lesions [No Skin Ulcers] : no skin ulcer [No Xanthoma] : no  xanthoma was observed [Oriented To Time, Place, And Person] : oriented to person, place, and time [Affect] : the affect was normal [Mood] : the mood was normal [No Anxiety] : not feeling anxious

## 2020-07-06 ENCOUNTER — APPOINTMENT (OUTPATIENT)
Dept: ELECTROPHYSIOLOGY | Facility: CLINIC | Age: 75
End: 2020-07-06
Payer: MEDICARE

## 2020-07-06 ENCOUNTER — NON-APPOINTMENT (OUTPATIENT)
Age: 75
End: 2020-07-06

## 2020-07-06 VITALS
HEIGHT: 62 IN | BODY MASS INDEX: 22.63 KG/M2 | OXYGEN SATURATION: 99 % | DIASTOLIC BLOOD PRESSURE: 77 MMHG | SYSTOLIC BLOOD PRESSURE: 150 MMHG | WEIGHT: 123 LBS | HEART RATE: 48 BPM

## 2020-07-06 PROCEDURE — 99203 OFFICE O/P NEW LOW 30 MIN: CPT

## 2020-07-06 PROCEDURE — 93000 ELECTROCARDIOGRAM COMPLETE: CPT

## 2020-07-07 NOTE — PHYSICAL EXAM
[General Appearance - Well Developed] : well developed [Normal Conjunctiva] : the conjunctiva exhibited no abnormalities [General Appearance - Well Nourished] : well nourished [Normal Oral Mucosa] : normal oral mucosa [Heart Rate And Rhythm] : heart rate and rhythm were normal [Heart Sounds] : normal S1 and S2 [Bowel Sounds] : normal bowel sounds [Skin Color & Pigmentation] : normal skin color and pigmentation [Nail Clubbing] : no clubbing of the fingernails [Oriented To Time, Place, And Person] : oriented to person, place, and time [FreeTextEntry1] : unsteady gait

## 2020-07-07 NOTE — HISTORY OF PRESENT ILLNESS
[FreeTextEntry1] : Ms. Ascencio  was seen in the [facility] today. For our records, please allow me to summarize the history and my findings.\par \par This is a pleasant 74 year old woman with no significant cardiovascular history until 3/2020 when she presented to ED for blurry vision, dizziness and intermittent left arm/leg numbness x 2 days, found to have multiple CVAs and presents today for evaluation for ILR implant. 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 MR brain showed CVAs of right medial temporal lobe, right occipital cortex, right posterior limb of internal capsule and left centrum semiovale and possible midportion of the medulla which may be related to cardioembolic source versus hypercoagulability. Moderate atrophy and small vessel white matter ischemic changes. CTA showed atheromatous irregularity along the carotid siphons/carotid bulbs bilaterally.\par \par Today, Ms. Ascencio feels generally well. Denies chest pain, palpitations, shortness of breath, lightheadedness, dizziness, or feelings of near syncope. \par \par

## 2020-07-07 NOTE — ASSESSMENT
[FreeTextEntry1] : In summary, Ms. Ascencio is a 74 year old female with a recent CVA in the setting of severe atheroma of aortic arch seen on BINH. We will place an external two week monitor today to assess for occult AF. If the results are unrevealing, she will likely be a candidate for ILR. We will call her upon receiving her results. \par \par Ms. Ascencio  appeared to understand the whole discussion and verbalized that all of his questions were answered to his satisfaction. \par \par Thank you for allowing me to be involved in the care of this pleasant man. Please feel free to contact me with any questions.

## 2020-07-20 ENCOUNTER — RX RENEWAL (OUTPATIENT)
Age: 75
End: 2020-07-20

## 2020-07-20 PROCEDURE — 0296T: CPT

## 2020-07-20 PROCEDURE — 0298T: CPT

## 2020-07-21 ENCOUNTER — RX RENEWAL (OUTPATIENT)
Age: 75
End: 2020-07-21

## 2020-07-27 NOTE — DISCUSSION/SUMMARY
[FreeTextEntry1] : Will arrange for ILR implant. \par Increase lisinopril to 20 \par COntinue other medications.

## 2020-07-27 NOTE — HISTORY OF PRESENT ILLNESS
[FreeTextEntry1] : Ms. Ascencio presents for initial visit after CVA in March. She has been recovering from CVA without significant deficit. She was referred to cardiology to assess risk for AF. She has HTN. BP at home has been elevated. BP is 140/73. Cholesterol is ok even prior to CVA. Started on lipitor 10 mg at the time of the CVA. She denies specific cardiac complaints. No chest pain. No SOB. No palpitations. No syncope.

## 2020-08-05 ENCOUNTER — APPOINTMENT (OUTPATIENT)
Dept: OTOLARYNGOLOGY | Facility: CLINIC | Age: 75
End: 2020-08-05
Payer: MEDICARE

## 2020-08-05 VITALS
TEMPERATURE: 97.2 F | SYSTOLIC BLOOD PRESSURE: 136 MMHG | HEIGHT: 62 IN | DIASTOLIC BLOOD PRESSURE: 75 MMHG | HEART RATE: 49 BPM | WEIGHT: 123 LBS | BODY MASS INDEX: 22.63 KG/M2

## 2020-08-05 PROCEDURE — 99214 OFFICE O/P EST MOD 30 MIN: CPT | Mod: 25

## 2020-08-05 PROCEDURE — 69210 REMOVE IMPACTED EAR WAX UNI: CPT

## 2020-08-05 NOTE — HISTORY OF PRESENT ILLNESS
[Tinnitus] : tinnitus [No] : patient does not have a  history of radiation therapy [de-identified] : 76 yo female\par Patient complains of right ear tinnitus since March. States in march she was hospitalized for mini strokes, since her discharge she has tinnitus. Describes as a constant static sound. State she feels like the right ear in under water as well. Denies hearing loss.\par h/o vertigo in the past\par no other modifying factors\par \par  [Dizziness] : no dizziness [Anxiety] : no anxiety [Headache] : no headache [Hearing Loss] : no hearing loss [Recurrent Otitis Media] : no recurrent otitis media [Otitis Media with Effusion] : no otitis media with effusion [Presbycusis] : no presbycusis [Congenital Ear Malformation] : no congenital ear malformation [Otosclerosis] : no otosclerosis [Meniere Disease] : no Meniere disease [Perilymphatic Fistula] : no perilymphatic fistula [Hypertension] : no hypertension [Loud Noise Exposure] : no history of loud noise exposure [Early Onset Hearing Loss] : no early onset hearing loss [Smoking] : no smoking [Stroke] : no stroke [Facial Pain] : no facial pain [Facial Pressure] : no facial pressure [Nasal Congestion] : no nasal congestion [Diplopia] : no diplopia [Ear Fullness] : no ear fullness [Allergic Rhinitis] : no allergic rhinitis [Maxillary Tooth Infection] : no maxillary tooth infection [Septal Deviation] : no septal deviation [Environmental Allergies] : no environmental allergies [Seasonal Allergies] : no seasonal allergies [Environmental Allergens] : no environmental allergens [Adenoidectomy] : no adenoidectomy [Nasal FB Removal] : no nasal foreign body removal [Allergies] : no allergies [Asthma] : no asthma [Neck Mass] : no neck mass [Neck Pain] : no neck pain [Chills] : no chills [Cold Intolerance] : no cold intolerance [Cough] : no cough [Fatigue] : no fatigue [Hyperthyroidism] : no hyperthyroidism [Heat Intolerance] : no heat intolerance [Hodgkin Disease] : no hodgkin disease [Sialadenitis] : no sialadenitis [Non-Hodgkin Lymphoma] : no non-hodgkin lymphoma [Tobacco Use] : no tobacco use [Alcohol Use] : no alcohol use [Thyroid Cancer] : no thyroid cancer [Graves Disease] : no graves disease

## 2020-08-05 NOTE — ASSESSMENT
[FreeTextEntry1] : Tinnitus right ear:\par -audio not available today will do next visit \par -wax removed from right ear \par -rx: medrol dose pack \par avoid loud noises\par May need ABR or MRI for further eval and tx\par \par f/u 1 month or prn

## 2020-08-05 NOTE — PHYSICAL EXAM
[Midline] : trachea located in midline position [Normal] : cranial nerves 2-12 intact [de-identified] : right ear wax

## 2020-08-07 ENCOUNTER — APPOINTMENT (OUTPATIENT)
Dept: DISASTER EMERGENCY | Facility: CLINIC | Age: 75
End: 2020-08-07

## 2020-08-08 LAB — SARS-COV-2 N GENE NPH QL NAA+PROBE: NOT DETECTED

## 2020-08-10 ENCOUNTER — OUTPATIENT (OUTPATIENT)
Dept: OUTPATIENT SERVICES | Facility: HOSPITAL | Age: 75
LOS: 1 days | End: 2020-08-10
Payer: MEDICARE

## 2020-08-10 VITALS
WEIGHT: 121.92 LBS | HEIGHT: 63 IN | TEMPERATURE: 98 F | OXYGEN SATURATION: 96 % | DIASTOLIC BLOOD PRESSURE: 75 MMHG | HEART RATE: 56 BPM | RESPIRATION RATE: 16 BRPM | SYSTOLIC BLOOD PRESSURE: 158 MMHG

## 2020-08-10 DIAGNOSIS — I63.9 CEREBRAL INFARCTION, UNSPECIFIED: ICD-10-CM

## 2020-08-10 DIAGNOSIS — M21.619 BUNION OF UNSPECIFIED FOOT: Chronic | ICD-10-CM

## 2020-08-10 DIAGNOSIS — J38.1 POLYP OF VOCAL CORD AND LARYNX: Chronic | ICD-10-CM

## 2020-08-10 PROCEDURE — 93010 ELECTROCARDIOGRAM REPORT: CPT

## 2020-08-10 PROCEDURE — 33285 INSJ SUBQ CAR RHYTHM MNTR: CPT

## 2020-08-10 PROCEDURE — C1764: CPT

## 2020-08-10 PROCEDURE — 93005 ELECTROCARDIOGRAM TRACING: CPT

## 2020-08-10 RX ORDER — SODIUM CHLORIDE 9 MG/ML
3 INJECTION INTRAMUSCULAR; INTRAVENOUS; SUBCUTANEOUS EVERY 8 HOURS
Refills: 0 | Status: DISCONTINUED | OUTPATIENT
Start: 2020-08-10 | End: 2020-08-25

## 2020-08-10 NOTE — H&P CARDIOLOGY - HISTORY OF PRESENT ILLNESS
74 year old  female with PMH of multiple CVAs, current smoker, HTN, HLD, no implantable cardiac monitoring device, vocal cord polypectomy, presents today for LOOP implant. Patient reports recent CVA 3/2020 at which time she underwent BINH revealing severe atheroma of aortic arch, small PFO and  EF 55%. Subsequent Holter monitoring showed brief, non sustained AT. Evaluated by Dr Dillon and LOOP recommended to r/o occult A fib. Patient denies symptoms.

## 2020-08-10 NOTE — H&P CARDIOLOGY - PMH
Atheroma  aortic arch  CVA (cerebral vascular accident)    HLD (hyperlipidemia)    HTN (hypertension)    No pertinent past medical history    Smoker    Vocal cord polyp

## 2020-08-18 ENCOUNTER — APPOINTMENT (OUTPATIENT)
Dept: ELECTROPHYSIOLOGY | Facility: CLINIC | Age: 75
End: 2020-08-18
Payer: MEDICARE

## 2020-08-18 VITALS
DIASTOLIC BLOOD PRESSURE: 80 MMHG | WEIGHT: 123 LBS | HEART RATE: 46 BPM | OXYGEN SATURATION: 98 % | HEIGHT: 62 IN | SYSTOLIC BLOOD PRESSURE: 177 MMHG | BODY MASS INDEX: 22.63 KG/M2

## 2020-08-18 PROCEDURE — 99214 OFFICE O/P EST MOD 30 MIN: CPT | Mod: 25

## 2020-08-18 PROCEDURE — 93291 INTERROG DEV EVAL SCRMS IP: CPT

## 2020-09-04 ENCOUNTER — APPOINTMENT (OUTPATIENT)
Dept: OTOLARYNGOLOGY | Facility: CLINIC | Age: 75
End: 2020-09-04
Payer: MEDICARE

## 2020-09-04 VITALS
TEMPERATURE: 98 F | DIASTOLIC BLOOD PRESSURE: 78 MMHG | SYSTOLIC BLOOD PRESSURE: 143 MMHG | WEIGHT: 123 LBS | BODY MASS INDEX: 22.63 KG/M2 | HEART RATE: 50 BPM | HEIGHT: 62 IN

## 2020-09-04 PROCEDURE — 92567 TYMPANOMETRY: CPT

## 2020-09-04 PROCEDURE — 92557 COMPREHENSIVE HEARING TEST: CPT

## 2020-09-04 PROCEDURE — 99214 OFFICE O/P EST MOD 30 MIN: CPT

## 2020-09-05 NOTE — HISTORY OF PRESENT ILLNESS
[No] : patient does not have a  history of radiation therapy [Tinnitus] : tinnitus [de-identified] : 74 yo female\par Patient complains of right ear tinnitus since March. States in march she was hospitalized for mini strokes, since her discharge she has tinnitus. Describes as a constant static sound. State she feels like the right ear in under water as well. Denies hearing loss.\par h/o vertigo in the past\par no other modifying factors\par \par  [FreeTextEntry1] : 9/4/2020-Patient presents for follow up. Finished Medrol dose pack which helped initially, now again has tinnitus. States she got used to it, doesn’t bother her. \par no other modifying factors\par \par  [Anxiety] : no anxiety [Dizziness] : no dizziness [Headache] : no headache [Hearing Loss] : no hearing loss [Recurrent Otitis Media] : no recurrent otitis media [Otitis Media with Effusion] : no otitis media with effusion [Presbycusis] : no presbycusis [Congenital Ear Malformation] : no congenital ear malformation [Otosclerosis] : no otosclerosis [Meniere Disease] : no Meniere disease [Perilymphatic Fistula] : no perilymphatic fistula [Hypertension] : no hypertension [Loud Noise Exposure] : no history of loud noise exposure [Early Onset Hearing Loss] : no early onset hearing loss [Smoking] : no smoking [Stroke] : no stroke [Facial Pain] : no facial pain [Facial Pressure] : no facial pressure [Nasal Congestion] : no nasal congestion [Diplopia] : no diplopia [Ear Fullness] : no ear fullness [Allergic Rhinitis] : no allergic rhinitis [Septal Deviation] : no septal deviation [Maxillary Tooth Infection] : no maxillary tooth infection [Environmental Allergies] : no environmental allergies [Seasonal Allergies] : no seasonal allergies [Environmental Allergens] : no environmental allergens [Adenoidectomy] : no adenoidectomy [Nasal FB Removal] : no nasal foreign body removal [Allergies] : no allergies [Asthma] : no asthma [Neck Pain] : no neck pain [Neck Mass] : no neck mass [Chills] : no chills [Cough] : no cough [Cold Intolerance] : no cold intolerance [Fatigue] : no fatigue [Heat Intolerance] : no heat intolerance [Hyperthyroidism] : no hyperthyroidism [Hodgkin Disease] : no hodgkin disease [Sialadenitis] : no sialadenitis [Non-Hodgkin Lymphoma] : no non-hodgkin lymphoma [Tobacco Use] : no tobacco use [Alcohol Use] : no alcohol use [Thyroid Cancer] : no thyroid cancer [Graves Disease] : no graves disease

## 2020-09-05 NOTE — DATA REVIEWED
[de-identified] : bilat SNHL\par Hearing Test performed to evaluate the extent of hearing loss and  to explain pt's symptoms\par

## 2020-09-05 NOTE — ASSESSMENT
[FreeTextEntry1] : Tinnitus right ear:\par -Hearing Test performed to evaluate the extent of hearing loss and to explain pt's symptoms\par -recommended acupuncture\par -Avoid loud noises\par -May need ABR or MRI for further eval and tx\par \par f/u 1 month or prn

## 2020-09-11 ENCOUNTER — APPOINTMENT (OUTPATIENT)
Dept: ELECTROPHYSIOLOGY | Facility: CLINIC | Age: 75
End: 2020-09-11
Payer: MEDICARE

## 2020-09-11 PROCEDURE — 93298 REM INTERROG DEV EVAL SCRMS: CPT

## 2020-09-11 PROCEDURE — G2066: CPT

## 2020-09-16 PROBLEM — J38.1 POLYP OF VOCAL CORD AND LARYNX: Chronic | Status: ACTIVE | Noted: 2020-08-10

## 2020-09-16 PROBLEM — F17.200 NICOTINE DEPENDENCE, UNSPECIFIED, UNCOMPLICATED: Chronic | Status: ACTIVE | Noted: 2020-08-10

## 2020-09-16 PROBLEM — I70.90 UNSPECIFIED ATHEROSCLEROSIS: Chronic | Status: ACTIVE | Noted: 2020-08-10

## 2020-09-16 PROBLEM — E78.5 HYPERLIPIDEMIA, UNSPECIFIED: Chronic | Status: ACTIVE | Noted: 2020-08-10

## 2020-09-16 PROBLEM — I63.9 CEREBRAL INFARCTION, UNSPECIFIED: Chronic | Status: ACTIVE | Noted: 2020-08-10

## 2020-09-16 PROBLEM — I10 ESSENTIAL (PRIMARY) HYPERTENSION: Chronic | Status: ACTIVE | Noted: 2020-08-10

## 2020-09-21 ENCOUNTER — APPOINTMENT (OUTPATIENT)
Dept: CARDIOLOGY | Facility: CLINIC | Age: 75
End: 2020-09-21
Payer: MEDICARE

## 2020-09-21 ENCOUNTER — NON-APPOINTMENT (OUTPATIENT)
Age: 75
End: 2020-09-21

## 2020-09-21 VITALS
DIASTOLIC BLOOD PRESSURE: 70 MMHG | OXYGEN SATURATION: 98 % | SYSTOLIC BLOOD PRESSURE: 148 MMHG | WEIGHT: 123 LBS | HEART RATE: 53 BPM | BODY MASS INDEX: 22.63 KG/M2 | HEIGHT: 62 IN

## 2020-09-21 VITALS — SYSTOLIC BLOOD PRESSURE: 126 MMHG | DIASTOLIC BLOOD PRESSURE: 80 MMHG

## 2020-09-21 PROCEDURE — 93000 ELECTROCARDIOGRAM COMPLETE: CPT

## 2020-09-21 PROCEDURE — 99214 OFFICE O/P EST MOD 30 MIN: CPT

## 2020-09-21 NOTE — DISCUSSION/SUMMARY
[FreeTextEntry1] : Overall doing well from a cardiac standpoint.\par Continue present medications.\par ILR checks\par Follow up in 6 months.

## 2020-09-21 NOTE — HISTORY OF PRESENT ILLNESS
[FreeTextEntry1] : Ms. Ascencio presents for follow up. Since last being seen she had ILR placed. She has denied any symptoms. She had CVA in the past and ILR to look for AF. She denies cardiac symptoms.

## 2020-09-21 NOTE — PHYSICAL EXAM
[General Appearance - Well Developed] : well developed [Normal Appearance] : normal appearance [Well Groomed] : well groomed [General Appearance - Well Nourished] : well nourished [No Deformities] : no deformities [General Appearance - In No Acute Distress] : no acute distress [Normal Conjunctiva] : the conjunctiva exhibited no abnormalities [Eyelids - No Xanthelasma] : the eyelids demonstrated no xanthelasmas [Normal Oral Mucosa] : normal oral mucosa [No Oral Pallor] : no oral pallor [No Oral Cyanosis] : no oral cyanosis [Normal Jugular Venous A Waves Present] : normal jugular venous A waves present [Normal Jugular Venous V Waves Present] : normal jugular venous V waves present [No Jugular Venous Dsouza A Waves] : no jugular venous dsouza A waves [Respiration, Rhythm And Depth] : normal respiratory rhythm and effort [Exaggerated Use Of Accessory Muscles For Inspiration] : no accessory muscle use [Auscultation Breath Sounds / Voice Sounds] : lungs were clear to auscultation bilaterally [Heart Rate And Rhythm] : heart rate and rhythm were normal [Heart Sounds] : normal S1 and S2 [Murmurs] : no murmurs present [Abdomen Soft] : soft [Abdomen Tenderness] : non-tender [Abdomen Mass (___ Cm)] : no abdominal mass palpated [Abnormal Walk] : normal gait [Gait - Sufficient For Exercise Testing] : the gait was sufficient for exercise testing [Nail Clubbing] : no clubbing of the fingernails [Cyanosis, Localized] : no localized cyanosis [Petechial Hemorrhages (___cm)] : no petechial hemorrhages [Skin Color & Pigmentation] : normal skin color and pigmentation [] : no rash [No Venous Stasis] : no venous stasis [Skin Lesions] : no skin lesions [No Skin Ulcers] : no skin ulcer [No Xanthoma] : no  xanthoma was observed [Oriented To Time, Place, And Person] : oriented to person, place, and time [Affect] : the affect was normal [Mood] : the mood was normal [No Anxiety] : not feeling anxious

## 2020-09-28 ENCOUNTER — OUTPATIENT (OUTPATIENT)
Dept: OUTPATIENT SERVICES | Facility: HOSPITAL | Age: 75
LOS: 1 days | End: 2020-09-28
Payer: MEDICARE

## 2020-09-28 ENCOUNTER — APPOINTMENT (OUTPATIENT)
Dept: RADIOLOGY | Facility: IMAGING CENTER | Age: 75
End: 2020-09-28
Payer: MEDICARE

## 2020-09-28 DIAGNOSIS — M21.619 BUNION OF UNSPECIFIED FOOT: Chronic | ICD-10-CM

## 2020-09-28 DIAGNOSIS — R05 COUGH: ICD-10-CM

## 2020-09-28 DIAGNOSIS — J38.1 POLYP OF VOCAL CORD AND LARYNX: Chronic | ICD-10-CM

## 2020-09-28 PROCEDURE — 71046 X-RAY EXAM CHEST 2 VIEWS: CPT

## 2020-09-28 PROCEDURE — 71046 X-RAY EXAM CHEST 2 VIEWS: CPT | Mod: 26

## 2020-09-29 RX ORDER — AZITHROMYCIN 250 MG/1
250 TABLET, FILM COATED ORAL
Qty: 1 | Refills: 1 | Status: COMPLETED | COMMUNITY
Start: 2020-09-29 | End: 2020-10-09

## 2020-10-13 ENCOUNTER — APPOINTMENT (OUTPATIENT)
Dept: ELECTROPHYSIOLOGY | Facility: CLINIC | Age: 75
End: 2020-10-13
Payer: MEDICARE

## 2020-10-13 PROCEDURE — 93298 REM INTERROG DEV EVAL SCRMS: CPT

## 2020-10-13 PROCEDURE — G2066: CPT

## 2020-11-12 ENCOUNTER — APPOINTMENT (OUTPATIENT)
Dept: ELECTROPHYSIOLOGY | Facility: CLINIC | Age: 75
End: 2020-11-12
Payer: MEDICARE

## 2020-11-12 PROCEDURE — 93298 REM INTERROG DEV EVAL SCRMS: CPT

## 2020-11-12 PROCEDURE — G2066: CPT

## 2020-11-23 ENCOUNTER — APPOINTMENT (OUTPATIENT)
Dept: INTERNAL MEDICINE | Facility: CLINIC | Age: 75
End: 2020-11-23

## 2020-11-25 ENCOUNTER — APPOINTMENT (OUTPATIENT)
Dept: INTERNAL MEDICINE | Facility: CLINIC | Age: 75
End: 2020-11-25
Payer: MEDICARE

## 2020-11-25 VITALS
RESPIRATION RATE: 14 BRPM | DIASTOLIC BLOOD PRESSURE: 75 MMHG | OXYGEN SATURATION: 95 % | SYSTOLIC BLOOD PRESSURE: 139 MMHG | TEMPERATURE: 97.7 F | HEART RATE: 56 BPM

## 2020-11-25 DIAGNOSIS — Z23 ENCOUNTER FOR IMMUNIZATION: ICD-10-CM

## 2020-11-25 DIAGNOSIS — Z87.898 PERSONAL HISTORY OF OTHER SPECIFIED CONDITIONS: ICD-10-CM

## 2020-11-25 DIAGNOSIS — Z92.29 PERSONAL HISTORY OF OTHER DRUG THERAPY: ICD-10-CM

## 2020-11-25 DIAGNOSIS — Z87.09 PERSONAL HISTORY OF OTHER DISEASES OF THE RESPIRATORY SYSTEM: ICD-10-CM

## 2020-11-25 DIAGNOSIS — Z01.818 ENCOUNTER FOR OTHER PREPROCEDURAL EXAMINATION: ICD-10-CM

## 2020-11-25 PROCEDURE — G0439: CPT

## 2020-11-25 PROCEDURE — 36415 COLL VENOUS BLD VENIPUNCTURE: CPT

## 2020-11-25 RX ORDER — BENZONATATE 100 MG/1
100 CAPSULE ORAL 3 TIMES DAILY
Qty: 30 | Refills: 0 | Status: DISCONTINUED | COMMUNITY
Start: 2020-09-25 | End: 2020-11-25

## 2020-11-25 RX ORDER — METHYLPREDNISOLONE 4 MG/1
4 TABLET ORAL
Qty: 21 | Refills: 0 | Status: DISCONTINUED | COMMUNITY
Start: 2020-08-05 | End: 2020-11-25

## 2020-11-25 RX ORDER — FAMOTIDINE 20 MG/1
20 TABLET, FILM COATED ORAL TWICE DAILY
Qty: 180 | Refills: 3 | Status: DISCONTINUED | COMMUNITY
End: 2020-11-25

## 2020-11-25 RX ORDER — MECLIZINE HYDROCHLORIDE 25 MG/1
25 TABLET ORAL 3 TIMES DAILY
Qty: 90 | Refills: 3 | Status: DISCONTINUED | COMMUNITY
Start: 2017-11-13 | End: 2020-11-25

## 2020-11-27 PROBLEM — Z87.09 HISTORY OF BRONCHITIS: Status: RESOLVED | Noted: 2020-09-29 | Resolved: 2020-11-27

## 2020-11-27 PROBLEM — Z23 NEED FOR PROPHYLACTIC VACCINATION WITH STREPTOCOCCUS PNEUMONIAE (PNEUMOCOCCUS) AND INFLUENZA VACCINES: Status: RESOLVED | Noted: 2018-10-22 | Resolved: 2020-11-27

## 2020-11-27 PROBLEM — Z01.818 PREOP TESTING: Status: RESOLVED | Noted: 2020-08-07 | Resolved: 2020-11-27

## 2020-11-27 PROBLEM — Z87.898 HISTORY OF DIZZINESS: Status: RESOLVED | Noted: 2020-08-05 | Resolved: 2020-11-27

## 2020-11-27 PROBLEM — Z92.29 HISTORY OF INFLUENZA VACCINATION: Status: RESOLVED | Noted: 2018-10-22 | Resolved: 2020-11-27

## 2020-11-27 NOTE — ASSESSMENT
[FreeTextEntry1] : HCM-\par -I have discussed Advance Care Plan with pt, see above\par -UTD flu vaccine\par -UTD Prevnar and PPSV23 \par -Breast ca screen: Last mammo w/bilateral breast US on 1/3/2020 birads 2, repeat screen due 1/2021\par -Cervical ca screen: PAP last 	~ age 66, reports normal. No longer indicated d/t age\par -Colorectal ca screen: Colonoscopy last 7/2019, repeat due 2022 as per GI\par -Seen by neuro ophthalmology last 7/2020, told now with bilateral cataracts and prescribed glasses. Has f/u 12/2020\par -Healthy eating and increase physical activity as tolerated recommended\par -Depression screen negative\par -RTO in 6 months for follow-up with PCP Dr. Ahn\par \par *Assessment and Plan as noted above*

## 2020-11-27 NOTE — PHYSICAL EXAM
[No Acute Distress] : no acute distress [Well Nourished] : well nourished [Well Developed] : well developed [Well-Appearing] : well-appearing [Normal Sclera/Conjunctiva] : normal sclera/conjunctiva [PERRL] : pupils equal round and reactive to light [No JVD] : no jugular venous distention [No Lymphadenopathy] : no lymphadenopathy [Supple] : supple [Thyroid Normal, No Nodules] : the thyroid was normal and there were no nodules present [No Respiratory Distress] : no respiratory distress  [No Accessory Muscle Use] : no accessory muscle use [Clear to Auscultation] : lungs were clear to auscultation bilaterally [Normal Rate] : normal rate  [Regular Rhythm] : with a regular rhythm [Normal S1, S2] : normal S1 and S2 [Pedal Pulses Present] : the pedal pulses are present [No Edema] : there was no peripheral edema [Soft] : abdomen soft [Non Tender] : non-tender [Non-distended] : non-distended [Normal Bowel Sounds] : normal bowel sounds [No CVA Tenderness] : no CVA  tenderness [No Joint Swelling] : no joint swelling [Coordination Grossly Intact] : coordination grossly intact [Normal Affect] : the affect was normal [Alert and Oriented x3] : oriented to person, place, and time [Normal Mood] : the mood was normal [Normal Insight/Judgement] : insight and judgment were intact [de-identified] : HR 56 (baseline) [de-identified] : + [de-identified] : RUE strength +5/LUE +4, bilateral lower ext +5, steady gait

## 2020-11-27 NOTE — REVIEW OF SYSTEMS
[Vision Problems] : vision problems [Fever] : no fever [Chills] : no chills [Fatigue] : no fatigue [Night Sweats] : no night sweats [Recent Change In Weight] : ~T no recent weight change [Chest Pain] : no chest pain [Palpitations] : no palpitations [Lower Ext Edema] : no lower extremity edema [Shortness Of Breath] : no shortness of breath [Wheezing] : no wheezing [Dyspnea on Exertion] : no dyspnea on exertion [Abdominal Pain] : no abdominal pain [Nausea] : no nausea [Constipation] : no constipation [Diarrhea] : diarrhea [Vomiting] : no vomiting [Dysuria] : no dysuria [Hematuria] : no hematuria [Headache] : no headache [Dizziness] : no dizziness [Fainting] : no fainting [Suicidal] : not suicidal [Depression] : no depression [FreeTextEntry3] : Since CVA, under neuro ophthalmology's care [FreeTextEntry9] : left sided weakness since CVA

## 2020-11-27 NOTE — HEALTH RISK ASSESSMENT
[Good] : ~his/her~ current health as good [Fair] :  ~his/her~ mood as fair [] : Yes [Yes] : Yes [4 or more  times a week (4 pts)] : 4 or more  times a week (4 points) [1 or 2 (0 pts)] : 1 or 2 (0 points) [Never (0 pts)] : Never (0 points) [No] : In the past 12 months have you used drugs other than those required for medical reasons? No [1] : 1) Little interest or pleasure doing things for several days (1) [0] : 2) Feeling down, depressed, or hopeless: Not at all (0) [With Family] : lives with family [Unemployed] : unemployed [] :  [# Of Children ___] : has [unfilled] children [Fully functional (bathing, dressing, toileting, transferring, walking, feeding)] : Fully functional (bathing, dressing, toileting, transferring, walking, feeding) [Fully functional (using the telephone, shopping, preparing meals, housekeeping, doing laundry, using] : Fully functional and needs no help or supervision to perform IADLs (using the telephone, shopping, preparing meals, housekeeping, doing laundry, using transportation, managing medications and managing finances) [Reports changes in vision] : Reports changes in vision [Reviewed no changes] : Reviewed no changes [Designated Healthcare Proxy] : Designated healthcare proxy [Name: ___] : Health Care Proxy's Name: [unfilled]  [Relationship: ___] : Relationship: [unfilled] [No falls in past year] : Patient reported no falls in the past year [Patient reported mammogram was normal] : Patient reported mammogram was normal [Patient reported colonoscopy was normal] : Patient reported colonoscopy was normal [FreeTextEntry1] : Denies [de-identified] : Cardiology, Neurology, Neuro ophthalmology  [de-identified] : Currently smoking 1/2 pack per day. Has smoked since age 19.  [Audit-CScore] : 4 [de-identified] : See HPI [de-identified] : Eats meat, vegetables, fish, chicken. Drinks about 500 ml of water per day [BSC2Wibbj] : 1 [Reports changes in hearing] : Reports no changes in hearing [Reports changes in dental health] : Reports no changes in dental health [MammogramDate] : 1/2020 [PapSmearDate] : age 66  [ColonoscopyDate] : 7/2019 [de-identified] : Since march 2020 difficulty seeing small objects. Seen by neuro ophthalmology 7/2020 [AdvancecareDate] : 11/2020 [FreeTextEntry4] : [employer] Cell #: [phone], home #: [phone]

## 2020-11-27 NOTE — COUNSELING
[Fall prevention counseling provided] : Fall prevention counseling provided [Adequate lighting] : Adequate lighting [No throw rugs] : No throw rugs [Use proper foot wear] : Use proper foot wear [Cessation strategies including cessation program discussed] : Cessation strategies including cessation program discussed [Willing to Quit Smoking] : Not willing to quit smoking [FreeTextEntry2] : Encouraged to think about quitting.

## 2020-11-27 NOTE — HISTORY OF PRESENT ILLNESS
[FreeTextEntry1] : CPE [de-identified] : 75 y.o  F with CVA (3/2020), ILR implant, left breast calcifications, current cigarette smoker, hx atherosclerosis of aorta, hx peripheral vertigo, hx PFO, presenting for an annual physical exam. Post-CVA on 3/2020, has seen cardiology, neurology, and neuro ophthalmology regularly. Reports feeling forgetful at times, some weakness on left side of body, unchanged since hospitalization.\par Seen by cardiology last on 9/2020 and found to be in stable condition. States feeling well today with no acute complaints. Previously going on walks with granddaughter but due to granddaughter back at work, has stopped going on walks. Does not feel safe enough to walk on own due to experiencing CVA earlier in year. Denies CP, SOB, BECKER, palpitations, lightheadedness, fever, chills, nausea, vomiting, diarrhea, unintentional weight loss, or recent travel.\par

## 2020-11-30 LAB
ALBUMIN SERPL ELPH-MCNC: 4.6 G/DL
ALP BLD-CCNC: 72 U/L
ALT SERPL-CCNC: 19 U/L
ANION GAP SERPL CALC-SCNC: 14 MMOL/L
AST SERPL-CCNC: 21 U/L
BASOPHILS # BLD AUTO: 0.06 K/UL
BASOPHILS NFR BLD AUTO: 0.8 %
BILIRUB SERPL-MCNC: 0.3 MG/DL
BUN SERPL-MCNC: 12 MG/DL
CALCIUM SERPL-MCNC: 9.9 MG/DL
CHLORIDE SERPL-SCNC: 103 MMOL/L
CHOLEST SERPL-MCNC: 145 MG/DL
CO2 SERPL-SCNC: 22 MMOL/L
CREAT SERPL-MCNC: 0.66 MG/DL
EOSINOPHIL # BLD AUTO: 0.05 K/UL
EOSINOPHIL NFR BLD AUTO: 0.7 %
ESTIMATED AVERAGE GLUCOSE: 123 MG/DL
GLUCOSE SERPL-MCNC: 88 MG/DL
HBA1C MFR BLD HPLC: 5.9 %
HCT VFR BLD CALC: 45 %
HDLC SERPL-MCNC: 75 MG/DL
HGB BLD-MCNC: 14.7 G/DL
IMM GRANULOCYTES NFR BLD AUTO: 0.4 %
LDLC SERPL CALC-MCNC: 55 MG/DL
LYMPHOCYTES # BLD AUTO: 1.91 K/UL
LYMPHOCYTES NFR BLD AUTO: 26.9 %
MAN DIFF?: NORMAL
MCHC RBC-ENTMCNC: 32.1 PG
MCHC RBC-ENTMCNC: 32.7 GM/DL
MCV RBC AUTO: 98.3 FL
MONOCYTES # BLD AUTO: 0.61 K/UL
MONOCYTES NFR BLD AUTO: 8.6 %
NEUTROPHILS # BLD AUTO: 4.44 K/UL
NEUTROPHILS NFR BLD AUTO: 62.6 %
NONHDLC SERPL-MCNC: 70 MG/DL
PLATELET # BLD AUTO: 304 K/UL
POTASSIUM SERPL-SCNC: 4.7 MMOL/L
PROT SERPL-MCNC: 6.9 G/DL
RBC # BLD: 4.58 M/UL
RBC # FLD: 12.8 %
SARS-COV-2 IGG SERPL IA-ACNC: 0.09 INDEX
SARS-COV-2 IGG SERPL QL IA: NEGATIVE
SODIUM SERPL-SCNC: 138 MMOL/L
TRIGL SERPL-MCNC: 77 MG/DL
TSH SERPL-ACNC: 0.59 UIU/ML
WBC # FLD AUTO: 7.1 K/UL

## 2021-01-21 ENCOUNTER — APPOINTMENT (OUTPATIENT)
Dept: ELECTROPHYSIOLOGY | Facility: CLINIC | Age: 76
End: 2021-01-21
Payer: MEDICARE

## 2021-01-21 PROCEDURE — G2066: CPT

## 2021-01-21 PROCEDURE — 93298 REM INTERROG DEV EVAL SCRMS: CPT

## 2021-01-27 ENCOUNTER — NON-APPOINTMENT (OUTPATIENT)
Age: 76
End: 2021-01-27

## 2021-02-03 ENCOUNTER — APPOINTMENT (OUTPATIENT)
Dept: INTERNAL MEDICINE | Facility: CLINIC | Age: 76
End: 2021-02-03
Payer: MEDICARE

## 2021-02-03 VITALS
TEMPERATURE: 98.2 F | OXYGEN SATURATION: 95 % | HEART RATE: 65 BPM | DIASTOLIC BLOOD PRESSURE: 90 MMHG | SYSTOLIC BLOOD PRESSURE: 160 MMHG

## 2021-02-03 DIAGNOSIS — Z87.09 PERSONAL HISTORY OF OTHER DISEASES OF THE RESPIRATORY SYSTEM: ICD-10-CM

## 2021-02-03 DIAGNOSIS — Z12.39 ENCOUNTER FOR OTHER SCREENING FOR MALIGNANT NEOPLASM OF BREAST: ICD-10-CM

## 2021-02-03 DIAGNOSIS — Q21.1 ATRIAL SEPTAL DEFECT: ICD-10-CM

## 2021-02-03 DIAGNOSIS — H93.11 TINNITUS, RIGHT EAR: ICD-10-CM

## 2021-02-03 DIAGNOSIS — Z12.11 ENCOUNTER FOR SCREENING FOR MALIGNANT NEOPLASM OF COLON: ICD-10-CM

## 2021-02-03 DIAGNOSIS — Z86.010 PERSONAL HISTORY OF COLONIC POLYPS: ICD-10-CM

## 2021-02-03 DIAGNOSIS — Z13.31 ENCOUNTER FOR SCREENING FOR DEPRESSION: ICD-10-CM

## 2021-02-03 DIAGNOSIS — H61.23 IMPACTED CERUMEN, BILATERAL: ICD-10-CM

## 2021-02-03 DIAGNOSIS — H61.21 IMPACTED CERUMEN, RIGHT EAR: ICD-10-CM

## 2021-02-03 DIAGNOSIS — Z13.39 ENCOUNTER FOR SCREENING EXAM FOR OTHER MENTAL HEALTH AND BEHAVIORAL DISORDERS: ICD-10-CM

## 2021-02-03 DIAGNOSIS — R92.1 MAMMOGRAPHIC CALCIFICATION FOUND ON DIAGNOSTIC IMAGING OF BREAST: ICD-10-CM

## 2021-02-03 DIAGNOSIS — H26.9 UNSPECIFIED CATARACT: ICD-10-CM

## 2021-02-03 PROCEDURE — 99214 OFFICE O/P EST MOD 30 MIN: CPT

## 2021-02-03 NOTE — ASSESSMENT
[High Risk Surgery - Intraperitoneal, Intrathoracic or Supringuinal Vascular Procedures] : High Risk Surgery - Intraperitoneal, Intrathoracic or Supringuinal Vascular Procedures - No (0) [Ischemic Heart Disease] : Ischemic Heart Disease - No (0) [Congestive Heart Failure] : Congestive Heart Failure - No (0) [Prior Cerebrovascular Accident or TIA] : Prior Cerebrovascular Accident or TIA - No (0) [Creatinine >= 2mg/dL (1 Point)] : Creatinine >= 2mg/dL - No (0) [Insulin-dependent Diabetic (1 Point)] : Insulin-dependent Diabetic - No (0) [0] : 0 , RCRI Class: I, Risk of Post-Op Cardiac Complications: 3.9%, 95% CI for Risk Estimate: 2.8% - 5.4% [Patient Optimized for Surgery] : Patient optimized for surgery [No Further Testing Recommended] : no further testing recommended [Modify anti-platelet treatment prior to procedure] : Modify anti-platelet treatment prior to procedure [FreeTextEntry6] : Hold ASA for 3 days prior to procedure, then restart  the day after procedure

## 2021-02-03 NOTE — HISTORY OF PRESENT ILLNESS
[Aortic Stenosis] : no aortic stenosis [Atrial Fibrillation] : no atrial fibrillation [Coronary Artery Disease] : no coronary artery disease [Recent Myocardial Infarction] : no recent myocardial infarction [Implantable Device/Pacemaker] : no implantable device/pacemaker [No Pertinent Pulmonary History] : no history of asthma, COPD, sleep apnea, or smoking [Asthma] : no asthma [COPD] : no COPD [Sleep Apnea] : no sleep apnea [Smoker] : not a smoker [No Adverse Anesthesia Reaction] : no adverse anesthesia reaction in self or family member [Family Member] : no family member with adverse anesthesia reaction/sudden death [Self] : no previous adverse anesthesia reaction [Chronic Anticoagulation] : chronic anticoagulation [Chronic Kidney Disease] : no chronic kidney disease [Diabetes] : no diabetes [(Patient denies any chest pain, claudication, dyspnea on exertion, orthopnea, palpitations or syncope)] : Patient denies any chest pain, claudication, dyspnea on exertion, orthopnea, palpitations or syncope [Good (7-10 METs)] : Good (7-10 METs) [Anti-Platelet Agents: _____] : Anti-Platelet Agents: [unfilled] [FreeTextEntry1] : cataract surgery [FreeTextEntry2] : 2/9/2021 [FreeTextEntry3] : Dr. Antonio Gill

## 2021-02-25 ENCOUNTER — APPOINTMENT (OUTPATIENT)
Dept: ELECTROPHYSIOLOGY | Facility: CLINIC | Age: 76
End: 2021-02-25
Payer: MEDICARE

## 2021-02-25 PROCEDURE — G2066: CPT

## 2021-02-25 PROCEDURE — 93298 REM INTERROG DEV EVAL SCRMS: CPT

## 2021-03-04 ENCOUNTER — NON-APPOINTMENT (OUTPATIENT)
Age: 76
End: 2021-03-04

## 2021-03-22 ENCOUNTER — NON-APPOINTMENT (OUTPATIENT)
Age: 76
End: 2021-03-22

## 2021-03-22 ENCOUNTER — APPOINTMENT (OUTPATIENT)
Dept: CARDIOLOGY | Facility: CLINIC | Age: 76
End: 2021-03-22
Payer: MEDICARE

## 2021-03-22 VITALS — DIASTOLIC BLOOD PRESSURE: 90 MMHG | SYSTOLIC BLOOD PRESSURE: 160 MMHG

## 2021-03-22 VITALS — OXYGEN SATURATION: 97 % | HEART RATE: 59 BPM | DIASTOLIC BLOOD PRESSURE: 93 MMHG | SYSTOLIC BLOOD PRESSURE: 183 MMHG

## 2021-03-22 PROCEDURE — 99214 OFFICE O/P EST MOD 30 MIN: CPT

## 2021-03-22 PROCEDURE — 93000 ELECTROCARDIOGRAM COMPLETE: CPT

## 2021-03-22 NOTE — HISTORY OF PRESENT ILLNESS
[FreeTextEntry1] : Ms. Ascencio presents for follow up. She is planning for cataract surgery. She had first shot of COVID vaccine - PFizer. \par She denies specific cardiac complaints. No chest pain. No SOB. No palpitations. No dizziness. No lightheadedness.

## 2021-03-22 NOTE — DISCUSSION/SUMMARY
[FreeTextEntry1] : Can stop Plavix given more than 90 days out from CVA. Discussed with neurology. \par Continue other medications for now. \par BP is elevated in the office but patient admits to being anxious. She will check at home for three days and report the findings. If elevated will titrate medications. \par No objection to cataract surgery. \par Follow up in 6 months.

## 2021-03-24 RX ORDER — NEPAFENAC 3 MG/ML
0.3 SUSPENSION/ DROPS OPHTHALMIC
Qty: 2 | Refills: 0 | Status: ACTIVE | COMMUNITY
Start: 2021-03-02

## 2021-03-24 RX ORDER — LISINOPRIL 10 MG/1
10 TABLET ORAL
Qty: 90 | Refills: 0 | Status: COMPLETED | COMMUNITY
Start: 2021-02-08

## 2021-03-24 RX ORDER — MOXIFLOXACIN OPHTHALMIC 5 MG/ML
0.5 SOLUTION/ DROPS OPHTHALMIC
Qty: 3 | Refills: 0 | Status: ACTIVE | COMMUNITY
Start: 2021-01-27

## 2021-03-24 RX ORDER — DIFLUPREDNATE 0.5 MG/ML
0.05 EMULSION OPHTHALMIC
Qty: 5 | Refills: 0 | Status: ACTIVE | COMMUNITY
Start: 2021-02-24

## 2021-04-01 ENCOUNTER — NON-APPOINTMENT (OUTPATIENT)
Age: 76
End: 2021-04-01

## 2021-04-01 ENCOUNTER — APPOINTMENT (OUTPATIENT)
Dept: ELECTROPHYSIOLOGY | Facility: CLINIC | Age: 76
End: 2021-04-01
Payer: MEDICARE

## 2021-04-01 PROCEDURE — 93298 REM INTERROG DEV EVAL SCRMS: CPT

## 2021-04-01 PROCEDURE — G2066: CPT

## 2021-04-12 ENCOUNTER — RX RENEWAL (OUTPATIENT)
Age: 76
End: 2021-04-12

## 2021-05-06 ENCOUNTER — NON-APPOINTMENT (OUTPATIENT)
Age: 76
End: 2021-05-06

## 2021-05-06 ENCOUNTER — APPOINTMENT (OUTPATIENT)
Dept: ELECTROPHYSIOLOGY | Facility: CLINIC | Age: 76
End: 2021-05-06
Payer: MEDICARE

## 2021-05-06 PROCEDURE — G2066: CPT

## 2021-05-06 PROCEDURE — 93298 REM INTERROG DEV EVAL SCRMS: CPT

## 2021-05-25 ENCOUNTER — APPOINTMENT (OUTPATIENT)
Dept: INTERNAL MEDICINE | Facility: CLINIC | Age: 76
End: 2021-05-25

## 2021-06-03 ENCOUNTER — RX RENEWAL (OUTPATIENT)
Age: 76
End: 2021-06-03

## 2021-06-11 ENCOUNTER — NON-APPOINTMENT (OUTPATIENT)
Age: 76
End: 2021-06-11

## 2021-06-11 ENCOUNTER — APPOINTMENT (OUTPATIENT)
Dept: ELECTROPHYSIOLOGY | Facility: CLINIC | Age: 76
End: 2021-06-11
Payer: MEDICARE

## 2021-06-11 PROCEDURE — G2066: CPT

## 2021-06-11 PROCEDURE — 93298 REM INTERROG DEV EVAL SCRMS: CPT

## 2021-07-15 ENCOUNTER — NON-APPOINTMENT (OUTPATIENT)
Age: 76
End: 2021-07-15

## 2021-07-15 ENCOUNTER — APPOINTMENT (OUTPATIENT)
Dept: ELECTROPHYSIOLOGY | Facility: CLINIC | Age: 76
End: 2021-07-15
Payer: MEDICARE

## 2021-07-15 PROCEDURE — 93298 REM INTERROG DEV EVAL SCRMS: CPT

## 2021-07-15 PROCEDURE — G2066: CPT

## 2021-07-19 ENCOUNTER — RX RENEWAL (OUTPATIENT)
Age: 76
End: 2021-07-19

## 2021-07-20 ENCOUNTER — RX RENEWAL (OUTPATIENT)
Age: 76
End: 2021-07-20

## 2021-08-16 NOTE — PROGRESS NOTE ADULT - SUBJECTIVE AND OBJECTIVE BOX
[FreeTextEntry1] : He will continue with tamsulosin twice daily.  Residual urine volume is stable.  He will have PSA level done with the rest of his labs soon.  Urine culture recently was negative.  He can follow-up in 6 months to 1 year. CARDIOLOGY     PROGRESS  NOTE   ________________________________________________    CHIEF COMPLAINT:Patient is a 74y old  Female who presents with a chief complaint of Ataxia (23 Mar 2020 08:37)  no complain.  	  REVIEW OF SYSTEMS:  CONSTITUTIONAL: No fever, weight loss, or fatigue  EYES: No eye pain, visual disturbances, or discharge  ENT:  No difficulty hearing, tinnitus, vertigo; No sinus or throat pain  NECK: No pain or stiffness  RESPIRATORY: No cough, wheezing, chills or hemoptysis; No Shortness of Breath  CARDIOVASCULAR: No chest pain, palpitations, passing out, dizziness, or leg swelling  GASTROINTESTINAL: No abdominal or epigastric pain. No nausea, vomiting, or hematemesis; No diarrhea or constipation. No melena or hematochezia.  GENITOURINARY: No dysuria, frequency, hematuria, or incontinence  NEUROLOGICAL: No headaches, memory loss, loss of strength, numbness, or tremors  SKIN: No itching, burning, rashes, or lesions   LYMPH Nodes: No enlarged glands  ENDOCRINE: No heat or cold intolerance; No hair loss  MUSCULOSKELETAL: No joint pain or swelling; No muscle, back, or extremity pain  PSYCHIATRIC: No depression, anxiety, mood swings, or difficulty sleeping  HEME/LYMPH: No easy bruising, or bleeding gums  ALLERGY AND IMMUNOLOGIC: No hives or eczema	    [ ] All others negative	  [ ] Unable to obtain    PHYSICAL EXAM:  T(C): 36.4 (03-23-20 @ 08:11), Max: 36.7 (03-22-20 @ 20:09)  HR: 51 (03-23-20 @ 08:11) (50 - 89)  BP: 168/77 (03-23-20 @ 08:11) (151/77 - 168/77)  RR: 18 (03-23-20 @ 08:11) (18 - 19)  SpO2: 96% (03-23-20 @ 08:11) (96% - 97%)  Wt(kg): --  I&O's Summary      Appearance: Normal	  HEENT:   Normal oral mucosa, PERRL, EOMI	  Lymphatic: No lymphadenopathy  Cardiovascular: Normal S1 S2, No JVD, + murmurs, No edema  Respiratory: Lungs clear to auscultation	  Psychiatry: A & O x 3, Mood & affect appropriate  Gastrointestinal:  Soft, Non-tender, + BS	  Skin: No rashes, No ecchymoses, No cyanosis	  Neurologic: Non-focal  Extremities: Normal range of motion, No clubbing, cyanosis or edema  Vascular: Peripheral pulses palpable 2+ bilaterally    MEDICATIONS  (STANDING):  aspirin enteric coated 81 milliGRAM(s) Oral daily  atorvastatin 80 milliGRAM(s) Oral at bedtime  enoxaparin Injectable 40 milliGRAM(s) SubCutaneous daily  meclizine 25 milliGRAM(s) Oral three times a day  metoprolol succinate ER 25 milliGRAM(s) Oral daily      TELEMETRY: 	    ECG:  	  RADIOLOGY:  OTHER: 	  	  LABS:	 	    CARDIAC MARKERS:      < from: Transthoracic Echocardiogram (03.22.20 @ 08:37) >  1. Mitral annular calcification and calcified mitral  leaflets with normal diastolic opening. Mild mitral  regurgitation.  2. Calcified trileaflet aortic valve with normal opening.  Mild aortic regurgitation.  3. Normal left ventricular systolic function. No segmental  wall motion abnormalities.  4. Normal diastolic function  5. Normal right ventricular size and function.  6. Color Doppler demonstrates no evidence of a patent  foramen ovale.  7. Normal pericardium with trace pericardial effusion.    < end of copied text >              proBNP:   Lipid Profile: Cholesterol 144  LDL 67  HDL 66  TG 52    HgA1c: Hemoglobin A1C, Whole Blood: 5.9 % (03-22 @ 09:48)    TSH: Thyroid Stimulating Hormone, Serum: 0.98 uIU/mL (03-22 @ 17:24)          Assessment and plan  ---------------------------  74F w/ PMH of peripheral vertigo (intermittent for past two years) presents with complaints of unsteady gait. Patient states she woke up on Tuesday 3/17 and noticed difficulty ambulating. No room spinning sensation noted. States she was just off balance when walking. Swaying to both sides. Has not remitted since. She also notes that she felt "numb" in her LUE and LLE (now resolved). Denies changes in speech, vision, hearing, swallowing, voice quality, numbness/tingling, weakness. No previous history of CVA/TIA, seizures, migraines. No history of arrythmia. No AC.   Patient says she has balance problems at baseline, though has never had trouble ambulating unassisted. This week she was been unable to walk even a few steps. No fevers, chills, cough. (21 Mar 2020 03:43)  while on tele pt with run of tachycardia.  pt with possible cardioembolic cv  on tele pt withy short run of PAT  will add small dose of beta blocker, pt has hx of bradycardia  may consider BINH, possible loop will discuss with neurology  dvt prophylaxis  tsh noted

## 2021-08-19 ENCOUNTER — NON-APPOINTMENT (OUTPATIENT)
Age: 76
End: 2021-08-19

## 2021-08-19 ENCOUNTER — APPOINTMENT (OUTPATIENT)
Dept: ELECTROPHYSIOLOGY | Facility: CLINIC | Age: 76
End: 2021-08-19
Payer: MEDICARE

## 2021-08-19 PROCEDURE — G2066: CPT

## 2021-08-19 PROCEDURE — 93298 REM INTERROG DEV EVAL SCRMS: CPT

## 2021-09-22 ENCOUNTER — APPOINTMENT (OUTPATIENT)
Dept: ELECTROPHYSIOLOGY | Facility: CLINIC | Age: 76
End: 2021-09-22
Payer: MEDICARE

## 2021-09-22 ENCOUNTER — NON-APPOINTMENT (OUTPATIENT)
Age: 76
End: 2021-09-22

## 2021-09-22 PROCEDURE — G2066: CPT

## 2021-09-22 PROCEDURE — 93298 REM INTERROG DEV EVAL SCRMS: CPT

## 2021-09-30 ENCOUNTER — RESULT CHARGE (OUTPATIENT)
Age: 76
End: 2021-09-30

## 2021-10-01 ENCOUNTER — APPOINTMENT (OUTPATIENT)
Dept: CARDIOLOGY | Facility: CLINIC | Age: 76
End: 2021-10-01
Payer: MEDICARE

## 2021-10-01 ENCOUNTER — NON-APPOINTMENT (OUTPATIENT)
Age: 76
End: 2021-10-01

## 2021-10-01 VITALS
BODY MASS INDEX: 23.37 KG/M2 | HEIGHT: 62 IN | WEIGHT: 127 LBS | HEART RATE: 53 BPM | DIASTOLIC BLOOD PRESSURE: 73 MMHG | OXYGEN SATURATION: 98 % | SYSTOLIC BLOOD PRESSURE: 145 MMHG

## 2021-10-01 PROCEDURE — 99214 OFFICE O/P EST MOD 30 MIN: CPT

## 2021-10-01 PROCEDURE — 93000 ELECTROCARDIOGRAM COMPLETE: CPT

## 2021-10-01 NOTE — HISTORY OF PRESENT ILLNESS
[FreeTextEntry1] : Ms. Ascencio presents for follow up. She had her COVID booster\par She denies cardiac complaints. No chest pain. No SOB. NO palpitations. \par She continues to have balance issues as a result of the stroke. She is seeing slow progress.

## 2021-10-27 ENCOUNTER — APPOINTMENT (OUTPATIENT)
Dept: ELECTROPHYSIOLOGY | Facility: CLINIC | Age: 76
End: 2021-10-27
Payer: MEDICARE

## 2021-10-27 ENCOUNTER — NON-APPOINTMENT (OUTPATIENT)
Age: 76
End: 2021-10-27

## 2021-10-27 PROCEDURE — 93298 REM INTERROG DEV EVAL SCRMS: CPT

## 2021-10-27 PROCEDURE — G2066: CPT | Mod: NC

## 2021-10-28 ENCOUNTER — APPOINTMENT (OUTPATIENT)
Dept: ELECTROPHYSIOLOGY | Facility: CLINIC | Age: 76
End: 2021-10-28

## 2021-11-26 ENCOUNTER — APPOINTMENT (OUTPATIENT)
Dept: INTERNAL MEDICINE | Facility: CLINIC | Age: 76
End: 2021-11-26

## 2021-11-29 ENCOUNTER — APPOINTMENT (OUTPATIENT)
Dept: INTERNAL MEDICINE | Facility: CLINIC | Age: 76
End: 2021-11-29
Payer: MEDICARE

## 2021-11-29 VITALS
WEIGHT: 126 LBS | SYSTOLIC BLOOD PRESSURE: 165 MMHG | OXYGEN SATURATION: 97 % | HEIGHT: 62 IN | DIASTOLIC BLOOD PRESSURE: 67 MMHG | TEMPERATURE: 97.9 F | BODY MASS INDEX: 23.19 KG/M2 | HEART RATE: 66 BPM

## 2021-11-29 PROCEDURE — G0439: CPT

## 2021-11-29 NOTE — ASSESSMENT
[FreeTextEntry1] : \par 76 year old woman current smoker presents for CPE.\par \par 1. HCM: mammo referral given; check labs with physical today; counselled pt on smoking cessation, she is not interested in quitting or cutting back at this time; GI referral given for screening colonoscopy; flu shot given already at pharmacy this season

## 2021-12-01 ENCOUNTER — APPOINTMENT (OUTPATIENT)
Dept: ELECTROPHYSIOLOGY | Facility: CLINIC | Age: 76
End: 2021-12-01
Payer: MEDICARE

## 2021-12-01 ENCOUNTER — NON-APPOINTMENT (OUTPATIENT)
Age: 76
End: 2021-12-01

## 2021-12-01 LAB
25(OH)D3 SERPL-MCNC: 22.3 NG/ML
ALBUMIN SERPL ELPH-MCNC: 4.3 G/DL
ALP BLD-CCNC: 83 U/L
ALT SERPL-CCNC: 15 U/L
ANION GAP SERPL CALC-SCNC: 11 MMOL/L
AST SERPL-CCNC: 17 U/L
BASOPHILS # BLD AUTO: 0.09 K/UL
BASOPHILS NFR BLD AUTO: 1.1 %
BILIRUB SERPL-MCNC: 0.3 MG/DL
BUN SERPL-MCNC: 10 MG/DL
CALCIUM SERPL-MCNC: 9.6 MG/DL
CHLORIDE SERPL-SCNC: 103 MMOL/L
CHOLEST SERPL-MCNC: 141 MG/DL
CO2 SERPL-SCNC: 23 MMOL/L
CREAT SERPL-MCNC: 0.7 MG/DL
EOSINOPHIL # BLD AUTO: 0.07 K/UL
EOSINOPHIL NFR BLD AUTO: 0.9 %
ESTIMATED AVERAGE GLUCOSE: 123 MG/DL
GLUCOSE SERPL-MCNC: 111 MG/DL
HBA1C MFR BLD HPLC: 5.9 %
HCT VFR BLD CALC: 45.2 %
HDLC SERPL-MCNC: 70 MG/DL
HGB BLD-MCNC: 15.3 G/DL
IMM GRANULOCYTES NFR BLD AUTO: 0.6 %
LDLC SERPL CALC-MCNC: 58 MG/DL
LYMPHOCYTES # BLD AUTO: 1.55 K/UL
LYMPHOCYTES NFR BLD AUTO: 19.7 %
MAN DIFF?: NORMAL
MCHC RBC-ENTMCNC: 32.3 PG
MCHC RBC-ENTMCNC: 33.8 GM/DL
MCV RBC AUTO: 95.4 FL
MONOCYTES # BLD AUTO: 0.61 K/UL
MONOCYTES NFR BLD AUTO: 7.8 %
NEUTROPHILS # BLD AUTO: 5.48 K/UL
NEUTROPHILS NFR BLD AUTO: 69.9 %
NONHDLC SERPL-MCNC: 71 MG/DL
PLATELET # BLD AUTO: 350 K/UL
POTASSIUM SERPL-SCNC: 5.2 MMOL/L
PROT SERPL-MCNC: 6.3 G/DL
RBC # BLD: 4.74 M/UL
RBC # FLD: 12.6 %
SODIUM SERPL-SCNC: 136 MMOL/L
TRIGL SERPL-MCNC: 62 MG/DL
TSH SERPL-ACNC: 0.8 UIU/ML
WBC # FLD AUTO: 7.85 K/UL

## 2021-12-01 PROCEDURE — 93298 REM INTERROG DEV EVAL SCRMS: CPT

## 2021-12-01 PROCEDURE — G2066: CPT

## 2022-01-06 ENCOUNTER — APPOINTMENT (OUTPATIENT)
Dept: ELECTROPHYSIOLOGY | Facility: CLINIC | Age: 77
End: 2022-01-06
Payer: MEDICARE

## 2022-01-06 ENCOUNTER — NON-APPOINTMENT (OUTPATIENT)
Age: 77
End: 2022-01-06

## 2022-01-06 PROCEDURE — G2066: CPT

## 2022-01-06 PROCEDURE — 93298 REM INTERROG DEV EVAL SCRMS: CPT

## 2022-02-10 ENCOUNTER — NON-APPOINTMENT (OUTPATIENT)
Age: 77
End: 2022-02-10

## 2022-02-10 ENCOUNTER — APPOINTMENT (OUTPATIENT)
Dept: ELECTROPHYSIOLOGY | Facility: CLINIC | Age: 77
End: 2022-02-10
Payer: MEDICARE

## 2022-02-10 PROCEDURE — 93298 REM INTERROG DEV EVAL SCRMS: CPT

## 2022-02-10 PROCEDURE — G2066: CPT

## 2022-03-17 ENCOUNTER — APPOINTMENT (OUTPATIENT)
Dept: ELECTROPHYSIOLOGY | Facility: CLINIC | Age: 77
End: 2022-03-17
Payer: MEDICARE

## 2022-03-17 ENCOUNTER — NON-APPOINTMENT (OUTPATIENT)
Age: 77
End: 2022-03-17

## 2022-03-17 PROCEDURE — 93298 REM INTERROG DEV EVAL SCRMS: CPT

## 2022-03-17 PROCEDURE — G2066: CPT

## 2022-04-11 ENCOUNTER — NON-APPOINTMENT (OUTPATIENT)
Age: 77
End: 2022-04-11

## 2022-04-11 ENCOUNTER — APPOINTMENT (OUTPATIENT)
Dept: CARDIOLOGY | Facility: CLINIC | Age: 77
End: 2022-04-11
Payer: MEDICARE

## 2022-04-11 VITALS
OXYGEN SATURATION: 98 % | HEIGHT: 62 IN | DIASTOLIC BLOOD PRESSURE: 73 MMHG | SYSTOLIC BLOOD PRESSURE: 163 MMHG | HEART RATE: 60 BPM

## 2022-04-11 VITALS — SYSTOLIC BLOOD PRESSURE: 136 MMHG | DIASTOLIC BLOOD PRESSURE: 78 MMHG

## 2022-04-11 PROCEDURE — 93000 ELECTROCARDIOGRAM COMPLETE: CPT

## 2022-04-11 PROCEDURE — 99214 OFFICE O/P EST MOD 30 MIN: CPT

## 2022-04-11 NOTE — REASON FOR VISIT
[Carotid, Aortic and Peripheral Vascular Disease] : carotid, aortic and peripheral vascular disease [Other: ____] : [unfilled]

## 2022-04-13 NOTE — DISCUSSION/SUMMARY
[FreeTextEntry1] : Overall doing well from a cardiac standpoint.\par Continue present medications.\par Initial BP elevated but recheck is better. She will check at home for a few weeks and we will adjust medications as indicated. \par Follow up in 6 months.

## 2022-04-13 NOTE — HISTORY OF PRESENT ILLNESS
[FreeTextEntry1] : Ms. Solano presents for follow up. Since last being seen she is still having issues with balance after the stroke. She is planing to start PT. \par She denies cardiac complaints. No palpitations. \par No chest pain. No SOB\par She has ILR since the CVA.

## 2022-04-21 ENCOUNTER — NON-APPOINTMENT (OUTPATIENT)
Age: 77
End: 2022-04-21

## 2022-04-21 ENCOUNTER — APPOINTMENT (OUTPATIENT)
Dept: ELECTROPHYSIOLOGY | Facility: CLINIC | Age: 77
End: 2022-04-21
Payer: MEDICARE

## 2022-04-21 PROCEDURE — G2066: CPT

## 2022-04-21 PROCEDURE — 93298 REM INTERROG DEV EVAL SCRMS: CPT

## 2022-05-26 ENCOUNTER — APPOINTMENT (OUTPATIENT)
Dept: ELECTROPHYSIOLOGY | Facility: CLINIC | Age: 77
End: 2022-05-26
Payer: MEDICARE

## 2022-05-26 ENCOUNTER — NON-APPOINTMENT (OUTPATIENT)
Age: 77
End: 2022-05-26

## 2022-05-26 PROCEDURE — G2066: CPT

## 2022-05-26 PROCEDURE — 93298 REM INTERROG DEV EVAL SCRMS: CPT

## 2022-05-27 ENCOUNTER — RX RENEWAL (OUTPATIENT)
Age: 77
End: 2022-05-27

## 2022-06-14 ENCOUNTER — NON-APPOINTMENT (OUTPATIENT)
Age: 77
End: 2022-06-14

## 2022-06-30 ENCOUNTER — APPOINTMENT (OUTPATIENT)
Dept: ELECTROPHYSIOLOGY | Facility: CLINIC | Age: 77
End: 2022-06-30

## 2022-06-30 ENCOUNTER — NON-APPOINTMENT (OUTPATIENT)
Age: 77
End: 2022-06-30

## 2022-06-30 PROCEDURE — 93298 REM INTERROG DEV EVAL SCRMS: CPT

## 2022-06-30 PROCEDURE — G2066: CPT

## 2022-08-01 ENCOUNTER — RX RENEWAL (OUTPATIENT)
Age: 77
End: 2022-08-01

## 2022-08-04 ENCOUNTER — NON-APPOINTMENT (OUTPATIENT)
Age: 77
End: 2022-08-04

## 2022-08-04 ENCOUNTER — APPOINTMENT (OUTPATIENT)
Dept: ELECTROPHYSIOLOGY | Facility: CLINIC | Age: 77
End: 2022-08-04

## 2022-08-04 PROCEDURE — G2066: CPT

## 2022-08-04 PROCEDURE — 93298 REM INTERROG DEV EVAL SCRMS: CPT

## 2022-09-09 ENCOUNTER — NON-APPOINTMENT (OUTPATIENT)
Age: 77
End: 2022-09-09

## 2022-09-09 ENCOUNTER — APPOINTMENT (OUTPATIENT)
Dept: ELECTROPHYSIOLOGY | Facility: CLINIC | Age: 77
End: 2022-09-09

## 2022-09-09 PROCEDURE — 93298 REM INTERROG DEV EVAL SCRMS: CPT

## 2022-09-09 PROCEDURE — G2066: CPT

## 2022-10-10 ENCOUNTER — NON-APPOINTMENT (OUTPATIENT)
Age: 77
End: 2022-10-10

## 2022-10-10 ENCOUNTER — APPOINTMENT (OUTPATIENT)
Dept: ELECTROPHYSIOLOGY | Facility: CLINIC | Age: 77
End: 2022-10-10

## 2022-10-10 PROCEDURE — G2066: CPT

## 2022-10-10 PROCEDURE — 93298 REM INTERROG DEV EVAL SCRMS: CPT

## 2022-10-12 ENCOUNTER — NON-APPOINTMENT (OUTPATIENT)
Age: 77
End: 2022-10-12

## 2022-10-12 ENCOUNTER — APPOINTMENT (OUTPATIENT)
Dept: CARDIOLOGY | Facility: CLINIC | Age: 77
End: 2022-10-12

## 2022-10-12 VITALS
OXYGEN SATURATION: 97 % | BODY MASS INDEX: 21.02 KG/M2 | HEIGHT: 62 IN | WEIGHT: 114.25 LBS | DIASTOLIC BLOOD PRESSURE: 81 MMHG | HEART RATE: 54 BPM | SYSTOLIC BLOOD PRESSURE: 143 MMHG

## 2022-10-12 PROCEDURE — 99214 OFFICE O/P EST MOD 30 MIN: CPT

## 2022-10-12 PROCEDURE — 93000 ELECTROCARDIOGRAM COMPLETE: CPT

## 2022-10-12 RX ORDER — CLOPIDOGREL BISULFATE 75 MG/1
75 TABLET, FILM COATED ORAL
Qty: 90 | Refills: 3 | Status: COMPLETED | COMMUNITY
Start: 2021-04-12 | End: 2022-10-12

## 2022-10-28 ENCOUNTER — NON-APPOINTMENT (OUTPATIENT)
Age: 77
End: 2022-10-28

## 2022-10-31 ENCOUNTER — APPOINTMENT (OUTPATIENT)
Dept: INTERNAL MEDICINE | Facility: CLINIC | Age: 77
End: 2022-10-31

## 2022-10-31 PROCEDURE — 99442: CPT | Mod: 95

## 2022-10-31 RX ORDER — IPRATROPIUM BROMIDE 21 UG/1
0.03 SPRAY NASAL 3 TIMES DAILY
Qty: 1 | Refills: 0 | Status: COMPLETED | COMMUNITY
Start: 2022-10-31 | End: 2022-11-30

## 2022-10-31 RX ORDER — AZITHROMYCIN 250 MG/1
250 TABLET, FILM COATED ORAL
Qty: 1 | Refills: 0 | Status: COMPLETED | COMMUNITY
Start: 2022-10-31 | End: 2022-11-05

## 2022-10-31 NOTE — HISTORY OF PRESENT ILLNESS
[FreeTextEntry1] : ILR is in place. No AF recorded. \par Denies specific cardiac complaints. \par BP checks at home have been in range per patient. \par

## 2022-10-31 NOTE — DISCUSSION/SUMMARY
[FreeTextEntry1] : Overall doing well from a cardiac standpoint.\par Continue present medications.\par Labs will be checked with [facility] and annual visit. \par Follow up in 6 months.  [EKG obtained to assist in diagnosis and management of assessed problem(s)] : EKG obtained to assist in diagnosis and management of assessed problem(s)

## 2022-11-14 ENCOUNTER — APPOINTMENT (OUTPATIENT)
Dept: ELECTROPHYSIOLOGY | Facility: CLINIC | Age: 77
End: 2022-11-14

## 2022-11-14 ENCOUNTER — NON-APPOINTMENT (OUTPATIENT)
Age: 77
End: 2022-11-14

## 2022-11-14 PROCEDURE — G2066: CPT

## 2022-11-14 PROCEDURE — 93298 REM INTERROG DEV EVAL SCRMS: CPT

## 2022-11-21 ENCOUNTER — RX RENEWAL (OUTPATIENT)
Age: 77
End: 2022-11-21

## 2022-11-22 ENCOUNTER — RX RENEWAL (OUTPATIENT)
Age: 77
End: 2022-11-22

## 2022-12-02 ENCOUNTER — APPOINTMENT (OUTPATIENT)
Dept: INTERNAL MEDICINE | Facility: CLINIC | Age: 77
End: 2022-12-02

## 2022-12-02 VITALS
WEIGHT: 115 LBS | HEIGHT: 60 IN | OXYGEN SATURATION: 96 % | DIASTOLIC BLOOD PRESSURE: 70 MMHG | BODY MASS INDEX: 22.58 KG/M2 | SYSTOLIC BLOOD PRESSURE: 146 MMHG | HEART RATE: 65 BPM | TEMPERATURE: 97.2 F

## 2022-12-02 PROCEDURE — G0008: CPT

## 2022-12-02 PROCEDURE — G0439: CPT

## 2022-12-02 PROCEDURE — 90662 IIV NO PRSV INCREASED AG IM: CPT

## 2022-12-02 NOTE — HEALTH RISK ASSESSMENT
[No] : No [1 or 2 (0 pts)] : 1 or 2 (0 points) [Never (0 pts)] : Never (0 points) [0] : 2) Feeling down, depressed, or hopeless: Not at all (0) [PHQ-2 Negative - No further assessment needed] : PHQ-2 Negative - No further assessment needed [Audit-CScore] : 0 [[id number]] : 0

## 2022-12-02 NOTE — ASSESSMENT
[FreeTextEntry1] : \par 77 year old woman current smoker presents for CPE.\par \par 1. HCM: mammo referral given; check labs with physical today; counselled pt on smoking cessation, she is not interested in quitting or cutting back at this time; GI referral given for screening colonoscopy; flu shot given already at pharmacy this season

## 2022-12-06 LAB
25(OH)D3 SERPL-MCNC: 64.9 NG/ML
ALBUMIN SERPL ELPH-MCNC: 4.2 G/DL
ALP BLD-CCNC: 89 U/L
ALT SERPL-CCNC: 23 U/L
ANION GAP SERPL CALC-SCNC: 12 MMOL/L
AST SERPL-CCNC: 22 U/L
BASOPHILS # BLD AUTO: 0.08 K/UL
BASOPHILS NFR BLD AUTO: 1.1 %
BILIRUB SERPL-MCNC: 0.4 MG/DL
BUN SERPL-MCNC: 12 MG/DL
CALCIUM SERPL-MCNC: 9.6 MG/DL
CHLORIDE SERPL-SCNC: 100 MMOL/L
CHOLEST SERPL-MCNC: 135 MG/DL
CO2 SERPL-SCNC: 24 MMOL/L
CREAT SERPL-MCNC: 0.66 MG/DL
EGFR: 91 ML/MIN/1.73M2
EOSINOPHIL # BLD AUTO: 0.08 K/UL
EOSINOPHIL NFR BLD AUTO: 1.1 %
ESTIMATED AVERAGE GLUCOSE: 123 MG/DL
GLUCOSE SERPL-MCNC: 85 MG/DL
HBA1C MFR BLD HPLC: 5.9 %
HCT VFR BLD CALC: 43.3 %
HDLC SERPL-MCNC: 74 MG/DL
HGB BLD-MCNC: 14.2 G/DL
IMM GRANULOCYTES NFR BLD AUTO: 0.5 %
LDLC SERPL CALC-MCNC: 48 MG/DL
LYMPHOCYTES # BLD AUTO: 1.83 K/UL
LYMPHOCYTES NFR BLD AUTO: 24.7 %
MAN DIFF?: NORMAL
MCHC RBC-ENTMCNC: 31.6 PG
MCHC RBC-ENTMCNC: 32.8 GM/DL
MCV RBC AUTO: 96.4 FL
MONOCYTES # BLD AUTO: 0.57 K/UL
MONOCYTES NFR BLD AUTO: 7.7 %
NEUTROPHILS # BLD AUTO: 4.8 K/UL
NEUTROPHILS NFR BLD AUTO: 64.9 %
NONHDLC SERPL-MCNC: 61 MG/DL
PLATELET # BLD AUTO: 317 K/UL
POTASSIUM SERPL-SCNC: 4.4 MMOL/L
PROT SERPL-MCNC: 6.4 G/DL
RBC # BLD: 4.49 M/UL
RBC # FLD: 12.7 %
SODIUM SERPL-SCNC: 135 MMOL/L
TRIGL SERPL-MCNC: 62 MG/DL
TSH SERPL-ACNC: 0.88 UIU/ML
WBC # FLD AUTO: 7.4 K/UL

## 2022-12-19 ENCOUNTER — NON-APPOINTMENT (OUTPATIENT)
Age: 77
End: 2022-12-19

## 2022-12-19 ENCOUNTER — APPOINTMENT (OUTPATIENT)
Dept: ELECTROPHYSIOLOGY | Facility: CLINIC | Age: 77
End: 2022-12-19
Payer: MEDICARE

## 2022-12-19 PROCEDURE — 93298 REM INTERROG DEV EVAL SCRMS: CPT

## 2022-12-19 PROCEDURE — G2066: CPT

## 2023-01-23 ENCOUNTER — NON-APPOINTMENT (OUTPATIENT)
Age: 78
End: 2023-01-23

## 2023-01-23 ENCOUNTER — APPOINTMENT (OUTPATIENT)
Dept: ELECTROPHYSIOLOGY | Facility: CLINIC | Age: 78
End: 2023-01-23
Payer: MEDICARE

## 2023-01-23 PROCEDURE — 93298 REM INTERROG DEV EVAL SCRMS: CPT

## 2023-01-23 PROCEDURE — G2066: CPT

## 2023-02-27 ENCOUNTER — APPOINTMENT (OUTPATIENT)
Dept: ELECTROPHYSIOLOGY | Facility: CLINIC | Age: 78
End: 2023-02-27
Payer: MEDICARE

## 2023-02-27 ENCOUNTER — NON-APPOINTMENT (OUTPATIENT)
Age: 78
End: 2023-02-27

## 2023-02-27 PROCEDURE — 93298 REM INTERROG DEV EVAL SCRMS: CPT

## 2023-02-27 PROCEDURE — G2066: CPT

## 2023-04-03 ENCOUNTER — NON-APPOINTMENT (OUTPATIENT)
Age: 78
End: 2023-04-03

## 2023-04-03 ENCOUNTER — APPOINTMENT (OUTPATIENT)
Dept: ELECTROPHYSIOLOGY | Facility: CLINIC | Age: 78
End: 2023-04-03
Payer: MEDICARE

## 2023-04-03 PROCEDURE — 93298 REM INTERROG DEV EVAL SCRMS: CPT

## 2023-04-03 PROCEDURE — G2066: CPT

## 2023-04-12 ENCOUNTER — APPOINTMENT (OUTPATIENT)
Dept: CARDIOLOGY | Facility: CLINIC | Age: 78
End: 2023-04-12
Payer: MEDICARE

## 2023-04-12 ENCOUNTER — NON-APPOINTMENT (OUTPATIENT)
Age: 78
End: 2023-04-12

## 2023-04-12 VITALS — HEART RATE: 54 BPM | SYSTOLIC BLOOD PRESSURE: 137 MMHG | DIASTOLIC BLOOD PRESSURE: 74 MMHG

## 2023-04-12 PROCEDURE — 99213 OFFICE O/P EST LOW 20 MIN: CPT

## 2023-04-12 PROCEDURE — 93000 ELECTROCARDIOGRAM COMPLETE: CPT

## 2023-04-12 NOTE — HISTORY OF PRESENT ILLNESS
[FreeTextEntry1] : Ms. NEUMANN presents for the evaluation of atherosclerosis. \par She denies cardiac symptoms. No chest pain. No SOB. No palpitations. No syncope. \par She believes she had COVID in January associated with some dizziness. \par Recent blood work LDL 48.

## 2023-04-12 NOTE — DISCUSSION/SUMMARY
[FreeTextEntry1] : Overall doing well from a cardiac standpoint.\par Continue present medications.\par Follow up in 6 months.  [EKG obtained to assist in diagnosis and management of assessed problem(s)] : EKG obtained to assist in diagnosis and management of assessed problem(s)

## 2023-04-19 ENCOUNTER — RX RENEWAL (OUTPATIENT)
Age: 78
End: 2023-04-19

## 2023-04-21 ENCOUNTER — RX RENEWAL (OUTPATIENT)
Age: 78
End: 2023-04-21

## 2023-05-08 ENCOUNTER — APPOINTMENT (OUTPATIENT)
Dept: ELECTROPHYSIOLOGY | Facility: CLINIC | Age: 78
End: 2023-05-08
Payer: MEDICARE

## 2023-05-08 ENCOUNTER — NON-APPOINTMENT (OUTPATIENT)
Age: 78
End: 2023-05-08

## 2023-05-08 PROCEDURE — 93298 REM INTERROG DEV EVAL SCRMS: CPT

## 2023-05-08 PROCEDURE — G2066: CPT

## 2023-05-19 ENCOUNTER — RX RENEWAL (OUTPATIENT)
Age: 78
End: 2023-05-19

## 2023-06-12 ENCOUNTER — NON-APPOINTMENT (OUTPATIENT)
Age: 78
End: 2023-06-12

## 2023-06-12 ENCOUNTER — APPOINTMENT (OUTPATIENT)
Dept: ELECTROPHYSIOLOGY | Facility: CLINIC | Age: 78
End: 2023-06-12
Payer: MEDICARE

## 2023-06-12 PROCEDURE — 93298 REM INTERROG DEV EVAL SCRMS: CPT

## 2023-06-12 PROCEDURE — G2066: CPT

## 2023-06-21 NOTE — H&P CARDIOLOGY - TIME:
14:59 Medical Necessity Statement: Based on my medical judgement, Mohs surgery is the most appropriate treatment for this cancer compared to other treatments.

## 2023-07-17 ENCOUNTER — NON-APPOINTMENT (OUTPATIENT)
Age: 78
End: 2023-07-17

## 2023-07-17 ENCOUNTER — APPOINTMENT (OUTPATIENT)
Dept: ELECTROPHYSIOLOGY | Facility: CLINIC | Age: 78
End: 2023-07-17
Payer: MEDICARE

## 2023-07-17 PROCEDURE — G2066: CPT

## 2023-07-17 PROCEDURE — 93298 REM INTERROG DEV EVAL SCRMS: CPT

## 2023-07-19 ENCOUNTER — RX RENEWAL (OUTPATIENT)
Age: 78
End: 2023-07-19

## 2023-08-21 ENCOUNTER — APPOINTMENT (OUTPATIENT)
Dept: ELECTROPHYSIOLOGY | Facility: CLINIC | Age: 78
End: 2023-08-21
Payer: MEDICARE

## 2023-08-21 ENCOUNTER — NON-APPOINTMENT (OUTPATIENT)
Age: 78
End: 2023-08-21

## 2023-08-22 PROCEDURE — G2066: CPT

## 2023-08-22 PROCEDURE — 93298 REM INTERROG DEV EVAL SCRMS: CPT

## 2023-09-22 ENCOUNTER — APPOINTMENT (OUTPATIENT)
Dept: ELECTROPHYSIOLOGY | Facility: CLINIC | Age: 78
End: 2023-09-22
Payer: MEDICARE

## 2023-09-22 ENCOUNTER — NON-APPOINTMENT (OUTPATIENT)
Age: 78
End: 2023-09-22

## 2023-09-23 PROCEDURE — G2066: CPT

## 2023-09-23 PROCEDURE — 93298 REM INTERROG DEV EVAL SCRMS: CPT

## 2023-10-13 ENCOUNTER — RX RENEWAL (OUTPATIENT)
Age: 78
End: 2023-10-13

## 2023-10-13 ENCOUNTER — APPOINTMENT (OUTPATIENT)
Dept: CARDIOLOGY | Facility: CLINIC | Age: 78
End: 2023-10-13
Payer: MEDICARE

## 2023-10-13 VITALS — SYSTOLIC BLOOD PRESSURE: 129 MMHG | OXYGEN SATURATION: 97 % | DIASTOLIC BLOOD PRESSURE: 78 MMHG

## 2023-10-13 VITALS
DIASTOLIC BLOOD PRESSURE: 85 MMHG | HEIGHT: 60 IN | BODY MASS INDEX: 23.16 KG/M2 | WEIGHT: 118 LBS | OXYGEN SATURATION: 96 % | HEART RATE: 58 BPM | SYSTOLIC BLOOD PRESSURE: 143 MMHG

## 2023-10-13 PROCEDURE — 99214 OFFICE O/P EST MOD 30 MIN: CPT

## 2023-10-13 PROCEDURE — 93000 ELECTROCARDIOGRAM COMPLETE: CPT

## 2023-10-27 ENCOUNTER — APPOINTMENT (OUTPATIENT)
Dept: ELECTROPHYSIOLOGY | Facility: CLINIC | Age: 78
End: 2023-10-27
Payer: MEDICARE

## 2023-10-27 ENCOUNTER — NON-APPOINTMENT (OUTPATIENT)
Age: 78
End: 2023-10-27

## 2023-10-28 PROCEDURE — 93298 REM INTERROG DEV EVAL SCRMS: CPT | Mod: NC

## 2023-10-28 PROCEDURE — G2066: CPT | Mod: NC

## 2023-11-20 ENCOUNTER — RX RENEWAL (OUTPATIENT)
Age: 78
End: 2023-11-20

## 2023-11-30 ENCOUNTER — NON-APPOINTMENT (OUTPATIENT)
Age: 78
End: 2023-11-30

## 2023-11-30 ENCOUNTER — APPOINTMENT (OUTPATIENT)
Dept: ELECTROPHYSIOLOGY | Facility: CLINIC | Age: 78
End: 2023-11-30
Payer: MEDICARE

## 2023-12-01 PROCEDURE — 93298 REM INTERROG DEV EVAL SCRMS: CPT | Mod: NC

## 2023-12-01 PROCEDURE — G2066: CPT

## 2023-12-04 ENCOUNTER — APPOINTMENT (OUTPATIENT)
Dept: INTERNAL MEDICINE | Facility: CLINIC | Age: 78
End: 2023-12-04

## 2024-01-02 ENCOUNTER — NON-APPOINTMENT (OUTPATIENT)
Age: 79
End: 2024-01-02

## 2024-01-02 ENCOUNTER — APPOINTMENT (OUTPATIENT)
Dept: ELECTROPHYSIOLOGY | Facility: CLINIC | Age: 79
End: 2024-01-02
Payer: MEDICARE

## 2024-01-03 PROCEDURE — 93298 REM INTERROG DEV EVAL SCRMS: CPT

## 2024-01-04 NOTE — END OF VISIT
[FreeTextEntry3] : Agree with above NP note. Cryptogenic stroke with normal workup for source thus far. Will undergo 2 week event monitoring with plan for ILR should this be unrevealing.
Fever in Children      If pt has uncontrollable vomiting, appears overly sleepy, can not tolerate food or drink, has decreased urination, appears overly sleepy--return to ED immediately.     Follow up with pediatrician 24-48 hours     Please give 100 mg of motrin every 6 hours as needed for fever      WHAT YOU NEED TO KNOW:    A fever is an increase in your child's body temperature. Normal body temperature is 98.6°F (37°C). Fever is generally defined as greater than 100.4°F (38°C). A fever is usually a sign that your child's body is fighting an infection caused by a virus. The cause of your child's fever may not be known. A fever can be serious in young children.    DISCHARGE INSTRUCTIONS:    Seek care immediately if:    Your child's temperature reaches 105°F (40.6°C).    Your child has a dry mouth, cracked lips, or cries without tears.     Your baby has a dry diaper for at least 8 hours, or he or she is urinating less than usual.    Your child is less alert, less active, or is acting differently than he or she usually does.    Your child has a seizure or has abnormal movements of the face, arms, or legs.    Your child is drooling and not able to swallow.    Your child has a stiff neck, severe headache, confusion, or is difficult to wake.    Your child has a fever for longer than 5 days.    Your child is crying or irritable and cannot be soothed.    Contact your child's healthcare provider if:    Your child's ear or forehead temperature is higher than 100.4°F (38°C).    Your child's oral or pacifier temperature is higher than 100°F (37.8°C).    Your child's armpit temperature is higher than 99°F (37.2°C).    Your child's fever lasts longer than 3 days.    You have questions or concerns about your child's fever.    Medicines: Your child may need any of the following:    Acetaminophen decreases pain and fever. It is available without a doctor's order. Ask how much to give your child and how often to give it. Follow directions. Read the labels of all other medicines your child uses to see if they also contain acetaminophen, or ask your child's doctor or pharmacist. Acetaminophen can cause liver damage if not taken correctly.    NSAIDs, such as ibuprofen, help decrease swelling, pain, and fever. This medicine is available with or without a doctor's order. NSAIDs can cause stomach bleeding or kidney problems in certain people. If your child takes blood thinner medicine, always ask if NSAIDs are safe for him. Always read the medicine label and follow directions. Do not give these medicines to children under 6 months of age without direction from your child's healthcare provider.    Do not give aspirin to children under 18 years of age. Your child could develop Reye syndrome if he takes aspirin. Reye syndrome can cause life-threatening brain and liver damage. Check your child's medicine labels for aspirin, salicylates, or oil of wintergreen.    Give your child's medicine as directed. Contact your child's healthcare provider if you think the medicine is not working as expected. Tell him or her if your child is allergic to any medicine. Keep a current list of the medicines, vitamins, and herbs your child takes. Include the amounts, and when, how, and why they are taken. Bring the list or the medicines in their containers to follow-up visits. Carry your child's medicine list with you in case of an emergency.    Temperature that is a fever in children:    An ear or forehead temperature of 100.4°F (38°C) or higher    An oral or pacifier temperature of 100°F (37.8°C) or higher    An armpit temperature of 99°F (37.2°C) or higher    The best way to take your child's temperature: The following are guidelines based on a child's age. Ask your child's healthcare provider about the best way to take your child's temperature.    If your baby is 3 months or younger, take the temperature in his or her armpit.    If your child is 3 months to 5 years, use an electronic pacifier temperature, depending on his or her age. After age 6 months, you can also take an ear, armpit, or forehead temperature.    If your child is 5 years or older, take an oral, ear, or forehead temperature.    Make your child more comfortable while he or she has a fever:    Give your child more liquids as directed. A fever makes your child sweat. This can increase his or her risk for dehydration. Liquids can help prevent dehydration.  Help your child drink at least 6 to 8 eight-ounce cups of clear liquids each day. Give your child water, juice, or broth. Do not give sports drinks to babies or toddlers.    Ask your child's healthcare provider if you should give your child an oral rehydration solution (ORS) to drink. An ORS has the right amounts of water, salts, and sugar your child needs to replace body fluids.    If you are breastfeeding or feeding your child formula, continue to do so. Your baby may not feel like drinking his or her regular amounts with each feeding. If so, feed him or her smaller amounts more often.    Dress your child in lightweight clothes. Shivers may be a sign that your child's fever is rising. Do not put extra blankets or clothes on him or her. This may cause his or her fever to rise even higher. Dress your child in light, comfortable clothing. Cover him or her with a lightweight blanket or sheet. Change your child's clothes, blanket, or sheets if they get wet.    Cool your child safely. Use a cool compress or give your child a bath in cool or lukewarm water. Your child's fever may not go down right away after his or her bath. Wait 30 minutes and check his or her temperature again. Do not put your child in a cold water or ice bath.    Follow up with your child's healthcare provider as directed: Write down your questions so you remember to ask them during your child's visits.

## 2024-02-05 ENCOUNTER — APPOINTMENT (OUTPATIENT)
Dept: ELECTROPHYSIOLOGY | Facility: CLINIC | Age: 79
End: 2024-02-05
Payer: MEDICARE

## 2024-02-05 ENCOUNTER — NON-APPOINTMENT (OUTPATIENT)
Age: 79
End: 2024-02-05

## 2024-02-06 ENCOUNTER — OUTPATIENT (OUTPATIENT)
Dept: OUTPATIENT SERVICES | Facility: HOSPITAL | Age: 79
LOS: 1 days | End: 2024-02-06
Payer: MEDICARE

## 2024-02-06 ENCOUNTER — APPOINTMENT (OUTPATIENT)
Dept: INTERNAL MEDICINE | Facility: CLINIC | Age: 79
End: 2024-02-06
Payer: MEDICARE

## 2024-02-06 VITALS
HEART RATE: 67 BPM | BODY MASS INDEX: 23.95 KG/M2 | WEIGHT: 122 LBS | OXYGEN SATURATION: 95 % | DIASTOLIC BLOOD PRESSURE: 80 MMHG | SYSTOLIC BLOOD PRESSURE: 150 MMHG | HEIGHT: 60 IN

## 2024-02-06 DIAGNOSIS — I70.0 ATHEROSCLEROSIS OF AORTA: ICD-10-CM

## 2024-02-06 DIAGNOSIS — J44.1 CHRONIC OBSTRUCTIVE PULMONARY DISEASE WITH (ACUTE) EXACERBATION: ICD-10-CM

## 2024-02-06 DIAGNOSIS — Z23 ENCOUNTER FOR IMMUNIZATION: ICD-10-CM

## 2024-02-06 DIAGNOSIS — J38.1 POLYP OF VOCAL CORD AND LARYNX: Chronic | ICD-10-CM

## 2024-02-06 DIAGNOSIS — Z01.818 ENCOUNTER FOR OTHER PREPROCEDURAL EXAMINATION: ICD-10-CM

## 2024-02-06 DIAGNOSIS — E55.9 VITAMIN D DEFICIENCY, UNSPECIFIED: ICD-10-CM

## 2024-02-06 DIAGNOSIS — Z00.00 ENCOUNTER FOR GENERAL ADULT MEDICAL EXAMINATION WITHOUT ABNORMAL FINDINGS: ICD-10-CM

## 2024-02-06 DIAGNOSIS — I63.9 CEREBRAL INFARCTION, UNSPECIFIED: ICD-10-CM

## 2024-02-06 DIAGNOSIS — M21.619 BUNION OF UNSPECIFIED FOOT: Chronic | ICD-10-CM

## 2024-02-06 DIAGNOSIS — Z00.00 ENCOUNTER FOR GENERAL ADULT MEDICAL EXAMINATION W/OUT ABNORMAL FINDINGS: ICD-10-CM

## 2024-02-06 PROCEDURE — 93298 REM INTERROG DEV EVAL SCRMS: CPT

## 2024-02-06 PROCEDURE — G0008: CPT

## 2024-02-06 PROCEDURE — 99397 PER PM REEVAL EST PAT 65+ YR: CPT | Mod: GY

## 2024-02-06 PROCEDURE — 90662 IIV NO PRSV INCREASED AG IM: CPT

## 2024-02-06 PROCEDURE — G0439: CPT

## 2024-02-06 RX ORDER — METOPROLOL SUCCINATE 25 MG/1
25 TABLET, EXTENDED RELEASE ORAL
Qty: 90 | Refills: 3 | Status: ACTIVE | COMMUNITY
Start: 2020-06-15 | End: 1900-01-01

## 2024-02-06 RX ORDER — ATORVASTATIN CALCIUM 10 MG/1
10 TABLET, FILM COATED ORAL
Qty: 90 | Refills: 3 | Status: ACTIVE | COMMUNITY
Start: 2020-07-21

## 2024-02-06 RX ORDER — ASPIRIN 81 MG
81 TABLET, DELAYED RELEASE (ENTERIC COATED) ORAL
Refills: 0 | Status: ACTIVE | COMMUNITY

## 2024-02-06 RX ORDER — ACETAMINOPHEN 650 MG/1
650 TABLET, FILM COATED, EXTENDED RELEASE ORAL
Qty: 60 | Refills: 0 | Status: ACTIVE | COMMUNITY
Start: 2024-02-06

## 2024-02-06 RX ORDER — CHLORHEXIDINE GLUCONATE 4 %
1000 LIQUID (ML) TOPICAL
Qty: 90 | Refills: 3 | Status: ACTIVE | COMMUNITY
Start: 2024-02-06

## 2024-02-06 RX ORDER — NICOTINE 4 MG/1
10 INHALANT RESPIRATORY (INHALATION)
Qty: 168 | Refills: 11 | Status: ACTIVE | COMMUNITY
Start: 2024-02-06 | End: 1900-01-01

## 2024-02-06 RX ORDER — AMLODIPINE BESYLATE 5 MG/1
5 TABLET ORAL
Qty: 90 | Refills: 3 | Status: ACTIVE | COMMUNITY
Start: 2023-04-19 | End: 1900-01-01

## 2024-02-06 RX ORDER — LISINOPRIL 40 MG/1
40 TABLET ORAL
Qty: 90 | Refills: 3 | Status: ACTIVE | COMMUNITY
Start: 2021-06-03 | End: 1900-01-01

## 2024-02-07 DIAGNOSIS — I10 ESSENTIAL (PRIMARY) HYPERTENSION: ICD-10-CM

## 2024-02-07 LAB
25(OH)D3 SERPL-MCNC: 38 NG/ML
ALBUMIN SERPL ELPH-MCNC: 4.3 G/DL
ALP BLD-CCNC: 88 U/L
ALT SERPL-CCNC: 17 U/L
ANION GAP SERPL CALC-SCNC: 13 MMOL/L
AST SERPL-CCNC: 23 U/L
BASOPHILS # BLD AUTO: 0.1 K/UL
BASOPHILS NFR BLD AUTO: 1.2 %
BILIRUB SERPL-MCNC: 0.3 MG/DL
BUN SERPL-MCNC: 16 MG/DL
CALCIUM SERPL-MCNC: 9.5 MG/DL
CHLORIDE SERPL-SCNC: 103 MMOL/L
CHOLEST SERPL-MCNC: 157 MG/DL
CO2 SERPL-SCNC: 23 MMOL/L
CREAT SERPL-MCNC: 0.75 MG/DL
EGFR: 82 ML/MIN/1.73M2
EOSINOPHIL # BLD AUTO: 0.14 K/UL
EOSINOPHIL NFR BLD AUTO: 1.6 %
ESTIMATED AVERAGE GLUCOSE: 117 MG/DL
GLUCOSE SERPL-MCNC: 103 MG/DL
HBA1C MFR BLD HPLC: 5.7 %
HCT VFR BLD CALC: 44.7 %
HDLC SERPL-MCNC: 78 MG/DL
HGB BLD-MCNC: 15.1 G/DL
IMM GRANULOCYTES NFR BLD AUTO: 0.6 %
LDLC SERPL CALC-MCNC: 68 MG/DL
LYMPHOCYTES # BLD AUTO: 2.6 K/UL
LYMPHOCYTES NFR BLD AUTO: 30.1 %
MAN DIFF?: NORMAL
MCHC RBC-ENTMCNC: 32.3 PG
MCHC RBC-ENTMCNC: 33.8 GM/DL
MCV RBC AUTO: 95.7 FL
MONOCYTES # BLD AUTO: 0.78 K/UL
MONOCYTES NFR BLD AUTO: 9 %
NEUTROPHILS # BLD AUTO: 4.98 K/UL
NEUTROPHILS NFR BLD AUTO: 57.5 %
NONHDLC SERPL-MCNC: 79 MG/DL
PLATELET # BLD AUTO: 355 K/UL
POTASSIUM SERPL-SCNC: 4.5 MMOL/L
PROT SERPL-MCNC: 6.9 G/DL
RBC # BLD: 4.67 M/UL
RBC # FLD: 12.6 %
SODIUM SERPL-SCNC: 139 MMOL/L
TRIGL SERPL-MCNC: 54 MG/DL
VIT B12 SERPL-MCNC: 396 PG/ML
WBC # FLD AUTO: 8.65 K/UL

## 2024-03-11 ENCOUNTER — APPOINTMENT (OUTPATIENT)
Dept: ELECTROPHYSIOLOGY | Facility: CLINIC | Age: 79
End: 2024-03-11
Payer: MEDICARE

## 2024-03-11 ENCOUNTER — NON-APPOINTMENT (OUTPATIENT)
Age: 79
End: 2024-03-11

## 2024-03-11 PROCEDURE — 93298 REM INTERROG DEV EVAL SCRMS: CPT

## 2024-03-22 NOTE — ED ADULT NURSE NOTE - CAS DISCH BELONGINGS RETURNED
SW contacted pt for 11 am audio session; however, pt reported that she was in a meeting. Pt reported that she forgot about the scheduled session. Pt agreed to reschedule. SW will have CHW contact pt to reschedule.   
Not applicable

## 2024-04-01 NOTE — CONSULT NOTE ADULT - SUBJECTIVE AND OBJECTIVE BOX
warm/no numbness/no tingling/no discoloration CC: asked to consult on this patient who arrived on [facility] re[facility] unit after a stroke    HPI: 75 yo female with PMH of peripheral vertigo (intermittent for past two years) presented to ED on 3/21 with complaints of unsteady gait and left sided numbness since 3/17. 'Off balance' when walking. MR 3/21 brain showed CVAs of right medial temporal lobe, right occipital cortex, right posterior limb of internal capsule and left centrum semiovale and possible midportion of the medulla which may be related to cardioembolic source versus hypercoagulability. Moderate atrophy and small vessel white matter ischemic changes. CTA showed atheromatous irregularity along the carotid siphons/carotid bulbs bilaterally. Otherwise, no flow-limiting stenosis or occlusion. BINH showed severe atheroma of aortic arch, small PFO, no thrombus in PITA, EF 55%. US LEs neg on 3/23. A1C 5.9% and LDL 67. ASA/Plavix started. Cardio eval requested for PAT on 3/22. ILR plan as outpt. Low dose toprol started for rate control. Pt admitted to acute rehab unit [facility]  3/24.     PAST MEDICAL & SURGICAL HISTORY:  Vertigo (intermittent)  No significant past surgical history    Social History: lives with granddaughter in apt  Denies smoking, alcohol or illicit drug use    NKDA    MEDICATIONS  (STANDING):  aspirin enteric coated 81 milliGRAM(s) Oral daily  atorvastatin 10 milliGRAM(s) Oral at bedtime  clopidogrel Tablet 75 milliGRAM(s) Oral daily  enoxaparin Injectable 40 milliGRAM(s) SubCutaneous daily  lisinopril 5 milliGRAM(s) Oral daily  metoprolol succinate ER 25 milliGRAM(s) Oral daily    MEDICATIONS  (PRN):  famotidine    Tablet 20 milliGRAM(s) Oral daily PRN Dyspepsia    REVIEW OF SYSTEMS  CONSTITUTIONAL: No fever, weight loss, or fatigue  	EYES: No eye pain, visual disturbances, or discharge  	ENMT:  No difficulty hearing, tinnitus, vertigo; No sinus or throat pain  	NECK: No pain or stiffness  	BREASTS: No pain, masses, or nipple discharge  	RESPIRATORY: No cough, wheezing, chills or hemoptysis; No shortness of breath  	CARDIOVASCULAR: No chest pain, palpitations, dizziness, or leg swelling  	GASTROINTESTINAL: No abdominal or epigastric pain. No nausea, vomiting, or hematemesis; No diarrhea or constipation. No melena or hematochezia.  	GENITOURINARY: No dysuria, frequency, hematuria, or incontinence  	NEUROLOGICAL: No headaches, memory loss, loss of strength, numbness, or tremors  	SKIN: No itching, burning, rashes, or lesions     Vital Signs Last 24 Hrs  T(C): 36.6 (24 Mar 2020 19:56), Max: 36.8 (24 Mar 2020 14:55)  T(F): 97.9 (24 Mar 2020 19:56), Max: 98.3 (24 Mar 2020 14:55)  HR: 55 (25 Mar 2020 06:13) (51 - 55)  BP: 130/90 (25 Mar 2020 06:13) (117/59 - 148/66)  BP(mean): --  RR: 15 (25 Mar 2020 06:13) (14 - 18)  SpO2: 96% (25 Mar 2020 06:13) (95% - 98%)    PHYSICAL EXAM:    GEN NAD, Comfortable  	HEENT - NCAT, EOMI  	Neck - Supple, No limited ROM  	Chest - CTA bilaterally,  	Cardiovascular - RRR, S1S2,   	Abdomen - BS+, Soft, NTND  	Extremities - No C/C/E, No calf tenderness   	Skin-no rash  	Neurologic Exam -                 	  HIF  - Awake, Alert, AAO to self, place, date, year, situation      No aphasia, normal attention, normal concentration, no apraxia, agnosia      Cranial Nerves - CN 2-12 intact- VFF, EOMI, no nystagmus, no dysarthria, dysphagia       Motor - mild left hemiparesis 4+/5 with pronator drift and decreased FFM    (03-25 @ 06:57)                      13.4  6.08 )-----------( 272                 38.9    Neutrophils = 3.11 (51.1%)  Lymphocytes = 2.01 (33.1%)  Eosinophils = 0.20 (3.3%)  Basophils = 0.08 (1.3%)  Monocytes = 0.65 (10.7%)  Bands = --%    03-25    138  |  104  |  18  ----------------------------<  99  4.2   |  26  |  0.64    Ca    8.9      25 Mar 2020 06:57    TPro  6.0  /  Alb  3.0<L>  /  TBili  0.3  /  DBili  x   /  AST  27  /  ALT  31  /  AlkPhos  64  03-25          < from: MR Head No Cont (03.21.20 @ 15:23) >  EXAM:  MR BRAIN                            PROCEDURE DATE:  03/21/2020            INTERPRETATION:  CLINICAL INFORMATION: Gait instability, dizzy    TECHNIQUE: MRI of the brain was performed without contrast. Sagittal T1 FLAIR and T2 GRE, and axial T2, T2 FLAIR, SPGR, SWI, diffusion-weighted images and an ADC map were obtained.    COMPARISON: CT head and CTA head and neck from 3/20/2020    FINDINGS:    Moderate prominence of the sulci and ventricles are consistent with age-appropriate volume loss.     Diffusion-weighted imaging demonstrates foci of diffusion restriction in the right medial temporal lobe, the right posterior limb of the internal capsule, the right occipital cortex and the left centrum semiovale. A questionable focus is identified in the mid portion of the medulla. Multiplicity of infarcts may be due to hypercoagulable state or emboli. There is additional moderate nonacute small vessel white matter ischemic change noted.    There is no intraparenchymal hematoma, mass effect or midline shift.     No abnormal extra-axial fluid collections are present. Major flow-voids at the base of the brain follow expected course and contour.    The calvarium is intact. The visualized intraorbital compartments, paranasal sinuses and tympanomastoid cavities appear free of acute disease.    IMPRESSION:    Multiple foci of diffusion restriction in the right medial temporal lobe, right occipital cortex, right posterior limb of internal capsule and left centrum semiovale and possible midportion of the medulla which may be related to cardioembolic source versus hypercoagulability. Moderate atrophy and small vessel white matter ischemic changes.                CONCHA MOLINA M.D., RADIOLOGY RESIDENT  This document has been electronically signed.  RAMONA ECHEVARRIA M.D., ATTENDING RADIOLOGIST  This document has been electronically signed. Mar 21 2020  3:59PM        < end of copied text > CC: asked to consult on this patient who arrived on [facility] re[facility] unit after a stroke    HPI: 75 yo female with PMH of peripheral vertigo (intermittent for past two years) presented to ED on 3/21 with complaints of unsteady gait and left sided numbness since 3/17. 'Off balance' when walking. MR 3/21 brain showed CVAs of right medial temporal lobe, right occipital cortex, right posterior limb of internal capsule and left centrum semiovale and possible midportion of the medulla which may be related to cardioembolic source versus hypercoagulability. Moderate atrophy and small vessel white matter ischemic changes. CTA showed atheromatous irregularity along the carotid siphons/carotid bulbs bilaterally. Otherwise, no flow-limiting stenosis or occlusion. BINH showed severe atheroma of aortic arch, small PFO, no thrombus in PITA, EF 55%. US LEs neg on 3/23. A1C 5.9% and LDL 67. ASA/Plavix started. Cardio eval requested for PAT on 3/22. ILR plan as outpt. Low dose toprol started for rate control. Pt admitted to acute rehab unit [facility]  3/24.     PAST MEDICAL & SURGICAL HISTORY:  Vertigo (intermittent)  No significant past surgical history    Social History: lives with granddaughter in apt  Works as ;   +Smoker PPD for many years up until time of stroke  Drinks glass of wine with dinner  Denies illicit drug use    NKDA    MEDICATIONS  (STANDING):  aspirin enteric coated 81 milliGRAM(s) Oral daily  atorvastatin 10 milliGRAM(s) Oral at bedtime  clopidogrel Tablet 75 milliGRAM(s) Oral daily  enoxaparin Injectable 40 milliGRAM(s) SubCutaneous daily  lisinopril 5 milliGRAM(s) Oral daily  metoprolol succinate ER 25 milliGRAM(s) Oral daily    MEDICATIONS  (PRN):  famotidine    Tablet 20 milliGRAM(s) Oral daily PRN Dyspepsia    REVIEW OF SYSTEMS  CONSTITUTIONAL: No fever, weight loss, or fatigue  	EYES: No eye pain, visual disturbances, or discharge  	ENMT:  No difficulty hearing, tinnitus, vertigo; No sinus or throat pain  	NECK: No pain or stiffness  	BREASTS: No pain, masses, or nipple discharge  	RESPIRATORY: No cough, wheezing, chills or hemoptysis; No shortness of breath  	CARDIOVASCULAR: No chest pain, palpitations, dizziness, or leg swelling  	GASTROINTESTINAL: No abdominal or epigastric pain. No nausea, vomiting, or hematemesis; No diarrhea or constipation. No melena or hematochezia.  	GENITOURINARY: No dysuria, frequency, hematuria, or incontinence  	NEUROLOGICAL: No headaches, memory loss, loss of strength, numbness, or tremors  	SKIN: No itching, burning, rashes, or lesions     Vital Signs Last 24 Hrs  T(C): 36.6 (24 Mar 2020 19:56), Max: 36.8 (24 Mar 2020 14:55)  T(F): 97.9 (24 Mar 2020 19:56), Max: 98.3 (24 Mar 2020 14:55)  HR: 55 (25 Mar 2020 06:13) (51 - 55)  BP: 130/90 (25 Mar 2020 06:13) (117/59 - 148/66)  BP(mean): --  RR: 15 (25 Mar 2020 06:13) (14 - 18)  SpO2: 96% (25 Mar 2020 06:13) (95% - 98%)    PHYSICAL EXAM:    GEN NAD, Comfortable  	HEENT - NCAT, EOMI  	Neck - Supple, No limited ROM  	Chest - CTA bilaterally,  	Cardiovascular - RRR, S1S2,   	Abdomen - BS+, Soft, NTND  	Extremities - No C/C/E, No calf tenderness   	Skin-no rash  	Neurologic Exam -                 	  HIF  - Awake, Alert, AAO to self, place, date, year, situation      No aphasia, normal attention, normal concentration, no apraxia, agnosia      Cranial Nerves - CN 2-12 intact- VFF, EOMI, no nystagmus, no dysarthria, dysphagia       Motor - mild left hemiparesis 4+/5 with pronator drift and decreased FFM    (03-25 @ 06:57)                      13.4  6.08 )-----------( 272                 38.9    Neutrophils = 3.11 (51.1%)  Lymphocytes = 2.01 (33.1%)  Eosinophils = 0.20 (3.3%)  Basophils = 0.08 (1.3%)  Monocytes = 0.65 (10.7%)  Bands = --%    03-25    138  |  104  |  18  ----------------------------<  99  4.2   |  26  |  0.64    Ca    8.9      25 Mar 2020 06:57    TPro  6.0  /  Alb  3.0<L>  /  TBili  0.3  /  DBili  x   /  AST  27  /  ALT  31  /  AlkPhos  64  03-25          < from: MR Head No Cont (03.21.20 @ 15:23) >  EXAM:  MR BRAIN                            PROCEDURE DATE:  03/21/2020            INTERPRETATION:  CLINICAL INFORMATION: Gait instability, dizzy    TECHNIQUE: MRI of the brain was performed without contrast. Sagittal T1 FLAIR and T2 GRE, and axial T2, T2 FLAIR, SPGR, SWI, diffusion-weighted images and an ADC map were obtained.    COMPARISON: CT head and CTA head and neck from 3/20/2020    FINDINGS:    Moderate prominence of the sulci and ventricles are consistent with age-appropriate volume loss.     Diffusion-weighted imaging demonstrates foci of diffusion restriction in the right medial temporal lobe, the right posterior limb of the internal capsule, the right occipital cortex and the left centrum semiovale. A questionable focus is identified in the mid portion of the medulla. Multiplicity of infarcts may be due to hypercoagulable state or emboli. There is additional moderate nonacute small vessel white matter ischemic change noted.    There is no intraparenchymal hematoma, mass effect or midline shift.     No abnormal extra-axial fluid collections are present. Major flow-voids at the base of the brain follow expected course and contour.    The calvarium is intact. The visualized intraorbital compartments, paranasal sinuses and tympanomastoid cavities appear free of acute disease.    IMPRESSION:    Multiple foci of diffusion restriction in the right medial temporal lobe, right occipital cortex, right posterior limb of internal capsule and left centrum semiovale and possible midportion of the medulla which may be related to cardioembolic source versus hypercoagulability. Moderate atrophy and small vessel white matter ischemic changes.                CONCHA MOLINA M.D., RADIOLOGY RESIDENT  This document has been electronically signed.  RAMONA ECHEVARRIA M.D., ATTENDING RADIOLOGIST  This document has been electronically signed. Mar 21 2020  3:59PM        < end of copied text >

## 2024-04-15 ENCOUNTER — APPOINTMENT (OUTPATIENT)
Dept: ELECTROPHYSIOLOGY | Facility: CLINIC | Age: 79
End: 2024-04-15
Payer: MEDICARE

## 2024-04-15 ENCOUNTER — NON-APPOINTMENT (OUTPATIENT)
Age: 79
End: 2024-04-15

## 2024-04-15 ENCOUNTER — APPOINTMENT (OUTPATIENT)
Dept: CARDIOLOGY | Facility: CLINIC | Age: 79
End: 2024-04-15
Payer: MEDICARE

## 2024-04-15 VITALS
DIASTOLIC BLOOD PRESSURE: 74 MMHG | OXYGEN SATURATION: 96 % | BODY MASS INDEX: 23.95 KG/M2 | HEIGHT: 60 IN | HEART RATE: 55 BPM | SYSTOLIC BLOOD PRESSURE: 131 MMHG | WEIGHT: 122 LBS

## 2024-04-15 DIAGNOSIS — I70.0 ATHEROSCLEROSIS OF AORTA: ICD-10-CM

## 2024-04-15 DIAGNOSIS — I63.9 CEREBRAL INFARCTION, UNSPECIFIED: ICD-10-CM

## 2024-04-15 PROCEDURE — 99214 OFFICE O/P EST MOD 30 MIN: CPT

## 2024-04-15 PROCEDURE — 93000 ELECTROCARDIOGRAM COMPLETE: CPT

## 2024-04-15 PROCEDURE — 93298 REM INTERROG DEV EVAL SCRMS: CPT

## 2024-04-15 NOTE — REASON FOR VISIT
[Carotid, Aortic and Peripheral Vascular Disease] : carotid, aortic and peripheral vascular disease Cx x 2

## 2024-04-15 NOTE — HISTORY OF PRESENT ILLNESS
[FreeTextEntry1] : Ms. NEUMANN presents for the evaluation of CVA and atherosclerosis of the aorta.  No specific cardiac complaints.

## 2024-04-15 NOTE — DISCUSSION/SUMMARY
[FreeTextEntry1] : Overall doing well from a cardiac standpoint. Continue present medications. No significant arrhythmia on ILR.  Follow up in 6 months.  [EKG obtained to assist in diagnosis and management of assessed problem(s)] : EKG obtained to assist in diagnosis and management of assessed problem(s)

## 2024-05-10 ENCOUNTER — NON-APPOINTMENT (OUTPATIENT)
Age: 79
End: 2024-05-10

## 2024-05-20 ENCOUNTER — NON-APPOINTMENT (OUTPATIENT)
Age: 79
End: 2024-05-20

## 2024-05-20 ENCOUNTER — APPOINTMENT (OUTPATIENT)
Dept: ELECTROPHYSIOLOGY | Facility: CLINIC | Age: 79
End: 2024-05-20
Payer: MEDICARE

## 2024-05-20 DIAGNOSIS — R09.82 POSTNASAL DRIP: ICD-10-CM

## 2024-05-20 PROCEDURE — 93298 REM INTERROG DEV EVAL SCRMS: CPT

## 2024-06-07 RX ORDER — AZELASTINE HYDROCHLORIDE 137 UG/1
0.1 SPRAY, METERED NASAL TWICE DAILY
Qty: 1 | Refills: 0 | Status: ACTIVE | COMMUNITY
Start: 2024-05-20 | End: 2024-07-07

## 2024-06-24 ENCOUNTER — APPOINTMENT (OUTPATIENT)
Dept: ELECTROPHYSIOLOGY | Facility: CLINIC | Age: 79
End: 2024-06-24
Payer: MEDICARE

## 2024-06-24 ENCOUNTER — NON-APPOINTMENT (OUTPATIENT)
Age: 79
End: 2024-06-24

## 2024-06-24 PROCEDURE — 93298 REM INTERROG DEV EVAL SCRMS: CPT

## 2024-07-08 ENCOUNTER — APPOINTMENT (OUTPATIENT)
Dept: INTERNAL MEDICINE | Facility: CLINIC | Age: 79
End: 2024-07-08

## 2024-07-26 ENCOUNTER — NON-APPOINTMENT (OUTPATIENT)
Age: 79
End: 2024-07-26

## 2024-07-26 ENCOUNTER — APPOINTMENT (OUTPATIENT)
Dept: ELECTROPHYSIOLOGY | Facility: CLINIC | Age: 79
End: 2024-07-26
Payer: MEDICARE

## 2024-07-26 PROCEDURE — 93298 REM INTERROG DEV EVAL SCRMS: CPT

## 2024-08-27 ENCOUNTER — APPOINTMENT (OUTPATIENT)
Dept: ELECTROPHYSIOLOGY | Facility: CLINIC | Age: 79
End: 2024-08-27
Payer: MEDICARE

## 2024-08-27 ENCOUNTER — NON-APPOINTMENT (OUTPATIENT)
Age: 79
End: 2024-08-27

## 2024-08-27 PROCEDURE — 93298 REM INTERROG DEV EVAL SCRMS: CPT

## 2024-08-30 ENCOUNTER — NON-APPOINTMENT (OUTPATIENT)
Age: 79
End: 2024-08-30

## 2024-10-01 ENCOUNTER — NON-APPOINTMENT (OUTPATIENT)
Age: 79
End: 2024-10-01

## 2024-10-01 ENCOUNTER — APPOINTMENT (OUTPATIENT)
Dept: ELECTROPHYSIOLOGY | Facility: CLINIC | Age: 79
End: 2024-10-01
Payer: MEDICARE

## 2024-10-01 PROCEDURE — 93298 REM INTERROG DEV EVAL SCRMS: CPT

## 2024-10-16 ENCOUNTER — APPOINTMENT (OUTPATIENT)
Dept: CARDIOLOGY | Facility: CLINIC | Age: 79
End: 2024-10-16
Payer: MEDICARE

## 2024-10-16 ENCOUNTER — NON-APPOINTMENT (OUTPATIENT)
Age: 79
End: 2024-10-16

## 2024-10-16 VITALS
HEART RATE: 73 BPM | SYSTOLIC BLOOD PRESSURE: 133 MMHG | BODY MASS INDEX: 23.83 KG/M2 | WEIGHT: 122 LBS | DIASTOLIC BLOOD PRESSURE: 75 MMHG | OXYGEN SATURATION: 96 %

## 2024-10-16 DIAGNOSIS — I70.0 ATHEROSCLEROSIS OF AORTA: ICD-10-CM

## 2024-10-16 PROCEDURE — 93000 ELECTROCARDIOGRAM COMPLETE: CPT

## 2024-10-16 PROCEDURE — 99214 OFFICE O/P EST MOD 30 MIN: CPT

## 2024-11-05 ENCOUNTER — NON-APPOINTMENT (OUTPATIENT)
Age: 79
End: 2024-11-05

## 2024-11-05 ENCOUNTER — APPOINTMENT (OUTPATIENT)
Dept: ELECTROPHYSIOLOGY | Facility: CLINIC | Age: 79
End: 2024-11-05

## 2024-11-05 PROCEDURE — 93298 REM INTERROG DEV EVAL SCRMS: CPT

## 2024-12-10 ENCOUNTER — APPOINTMENT (OUTPATIENT)
Dept: ELECTROPHYSIOLOGY | Facility: CLINIC | Age: 79
End: 2024-12-10
Payer: MEDICARE

## 2024-12-10 ENCOUNTER — NON-APPOINTMENT (OUTPATIENT)
Age: 79
End: 2024-12-10

## 2024-12-10 PROCEDURE — 93298 REM INTERROG DEV EVAL SCRMS: CPT

## 2025-01-14 ENCOUNTER — APPOINTMENT (OUTPATIENT)
Dept: ELECTROPHYSIOLOGY | Facility: CLINIC | Age: 80
End: 2025-01-14

## 2025-01-14 PROCEDURE — 93298 REM INTERROG DEV EVAL SCRMS: CPT

## 2025-02-14 ENCOUNTER — APPOINTMENT (OUTPATIENT)
Dept: ELECTROPHYSIOLOGY | Facility: CLINIC | Age: 80
End: 2025-02-14

## 2025-02-14 PROCEDURE — 93298 REM INTERROG DEV EVAL SCRMS: CPT

## 2025-03-12 ENCOUNTER — OUTPATIENT (OUTPATIENT)
Dept: OUTPATIENT SERVICES | Facility: HOSPITAL | Age: 80
LOS: 1 days | End: 2025-03-12
Payer: MEDICARE

## 2025-03-12 ENCOUNTER — APPOINTMENT (OUTPATIENT)
Dept: INTERNAL MEDICINE | Facility: CLINIC | Age: 80
End: 2025-03-12
Payer: MEDICARE

## 2025-03-12 VITALS
OXYGEN SATURATION: 96 % | WEIGHT: 128 LBS | HEIGHT: 60 IN | HEART RATE: 70 BPM | SYSTOLIC BLOOD PRESSURE: 148 MMHG | BODY MASS INDEX: 25.13 KG/M2 | DIASTOLIC BLOOD PRESSURE: 72 MMHG

## 2025-03-12 VITALS — DIASTOLIC BLOOD PRESSURE: 74 MMHG | SYSTOLIC BLOOD PRESSURE: 138 MMHG

## 2025-03-12 DIAGNOSIS — F17.200 NICOTINE DEPENDENCE, UNSPECIFIED, UNCOMPLICATED: ICD-10-CM

## 2025-03-12 DIAGNOSIS — Z00.00 ENCOUNTER FOR GENERAL ADULT MEDICAL EXAMINATION WITHOUT ABNORMAL FINDINGS: ICD-10-CM

## 2025-03-12 DIAGNOSIS — J38.1 POLYP OF VOCAL CORD AND LARYNX: Chronic | ICD-10-CM

## 2025-03-12 DIAGNOSIS — M21.619 BUNION OF UNSPECIFIED FOOT: Chronic | ICD-10-CM

## 2025-03-12 DIAGNOSIS — R79.89 OTHER SPECIFIED ABNORMAL FINDINGS OF BLOOD CHEMISTRY: ICD-10-CM

## 2025-03-12 DIAGNOSIS — Z23 ENCOUNTER FOR IMMUNIZATION: ICD-10-CM

## 2025-03-12 DIAGNOSIS — Z13.820 ENCOUNTER FOR SCREENING FOR OSTEOPOROSIS: ICD-10-CM

## 2025-03-12 PROCEDURE — G0439: CPT

## 2025-03-12 PROCEDURE — G0008: CPT

## 2025-03-12 PROCEDURE — G0009: CPT

## 2025-03-12 PROCEDURE — 90677 PCV20 VACCINE IM: CPT

## 2025-03-12 PROCEDURE — 90662 IIV NO PRSV INCREASED AG IM: CPT

## 2025-03-12 RX ORDER — ZOSTER VACCINE RECOMBINANT, ADJUVANTED 50 MCG/0.5
50 KIT INTRAMUSCULAR
Qty: 1 | Refills: 0 | Status: ACTIVE | COMMUNITY
Start: 2025-03-12 | End: 1900-01-01

## 2025-03-12 RX ORDER — NICOTINE POLACRILEX 2 MG/1
2 GUM, CHEWING BUCCAL
Qty: 1 | Refills: 11 | Status: ACTIVE | COMMUNITY
Start: 2025-03-12 | End: 1900-01-01

## 2025-03-13 LAB
ALBUMIN SERPL ELPH-MCNC: 4.5 G/DL
ALP BLD-CCNC: 112 U/L
ALT SERPL-CCNC: 14 U/L
ANION GAP SERPL CALC-SCNC: 12 MMOL/L
AST SERPL-CCNC: 19 U/L
BASOPHILS # BLD AUTO: 0.11 K/UL
BASOPHILS NFR BLD AUTO: 1.3 %
BILIRUB SERPL-MCNC: 0.4 MG/DL
BUN SERPL-MCNC: 11 MG/DL
CALCIUM SERPL-MCNC: 9.9 MG/DL
CHLORIDE SERPL-SCNC: 101 MMOL/L
CHOLEST SERPL-MCNC: 144 MG/DL
CO2 SERPL-SCNC: 23 MMOL/L
CREAT SERPL-MCNC: 0.74 MG/DL
EGFRCR SERPLBLD CKD-EPI 2021: 83 ML/MIN/1.73M2
EOSINOPHIL # BLD AUTO: 0.1 K/UL
EOSINOPHIL NFR BLD AUTO: 1.1 %
ESTIMATED AVERAGE GLUCOSE: 123 MG/DL
GLUCOSE SERPL-MCNC: 99 MG/DL
HBA1C MFR BLD HPLC: 5.9 %
HCT VFR BLD CALC: 44.8 %
HDLC SERPL-MCNC: 75 MG/DL
HGB BLD-MCNC: 15.8 G/DL
IMM GRANULOCYTES NFR BLD AUTO: 0.6 %
LDLC SERPL CALC-MCNC: 55 MG/DL
LYMPHOCYTES # BLD AUTO: 2.92 K/UL
LYMPHOCYTES NFR BLD AUTO: 33.3 %
MAN DIFF?: NORMAL
MCHC RBC-ENTMCNC: 32.4 PG
MCHC RBC-ENTMCNC: 35.3 G/DL
MCV RBC AUTO: 91.8 FL
MONOCYTES # BLD AUTO: 0.7 K/UL
MONOCYTES NFR BLD AUTO: 8 %
NEUTROPHILS # BLD AUTO: 4.89 K/UL
NEUTROPHILS NFR BLD AUTO: 55.7 %
NONHDLC SERPL-MCNC: 69 MG/DL
PLATELET # BLD AUTO: 351 K/UL
POTASSIUM SERPL-SCNC: 5.3 MMOL/L
PROT SERPL-MCNC: 6.9 G/DL
RBC # BLD: 4.88 M/UL
RBC # FLD: 13 %
SODIUM SERPL-SCNC: 136 MMOL/L
TRIGL SERPL-MCNC: 72 MG/DL
TSH SERPL-ACNC: 1.6 UIU/ML
VIT B12 SERPL-MCNC: 415 PG/ML
WBC # FLD AUTO: 8.77 K/UL

## 2025-03-21 ENCOUNTER — NON-APPOINTMENT (OUTPATIENT)
Age: 80
End: 2025-03-21

## 2025-03-21 ENCOUNTER — APPOINTMENT (OUTPATIENT)
Dept: ELECTROPHYSIOLOGY | Facility: CLINIC | Age: 80
End: 2025-03-21

## 2025-03-21 PROCEDURE — 93298 REM INTERROG DEV EVAL SCRMS: CPT

## 2025-04-23 ENCOUNTER — APPOINTMENT (OUTPATIENT)
Dept: ELECTROPHYSIOLOGY | Facility: CLINIC | Age: 80
End: 2025-04-23

## 2025-04-23 ENCOUNTER — APPOINTMENT (OUTPATIENT)
Dept: CARDIOLOGY | Facility: CLINIC | Age: 80
End: 2025-04-23
Payer: MEDICARE

## 2025-04-23 ENCOUNTER — NON-APPOINTMENT (OUTPATIENT)
Age: 80
End: 2025-04-23

## 2025-04-23 VITALS — DIASTOLIC BLOOD PRESSURE: 80 MMHG | SYSTOLIC BLOOD PRESSURE: 128 MMHG

## 2025-04-23 VITALS
WEIGHT: 130 LBS | SYSTOLIC BLOOD PRESSURE: 148 MMHG | DIASTOLIC BLOOD PRESSURE: 81 MMHG | HEART RATE: 70 BPM | OXYGEN SATURATION: 96 % | BODY MASS INDEX: 25.39 KG/M2

## 2025-04-23 DIAGNOSIS — I70.0 ATHEROSCLEROSIS OF AORTA: ICD-10-CM

## 2025-04-23 PROCEDURE — 93298 REM INTERROG DEV EVAL SCRMS: CPT

## 2025-04-23 PROCEDURE — 99214 OFFICE O/P EST MOD 30 MIN: CPT

## 2025-04-23 PROCEDURE — 93000 ELECTROCARDIOGRAM COMPLETE: CPT

## 2025-05-28 ENCOUNTER — APPOINTMENT (OUTPATIENT)
Dept: ELECTROPHYSIOLOGY | Facility: CLINIC | Age: 80
End: 2025-05-28

## 2025-05-28 ENCOUNTER — NON-APPOINTMENT (OUTPATIENT)
Age: 80
End: 2025-05-28

## 2025-05-28 PROCEDURE — 93298 REM INTERROG DEV EVAL SCRMS: CPT

## 2025-06-05 DIAGNOSIS — I10 ESSENTIAL (PRIMARY) HYPERTENSION: ICD-10-CM

## 2025-06-27 NOTE — ED PROVIDER NOTE - NS ED NOTE AC HIGH RISK COUNTRIES
Requested medication(s) are due for refill today: Yes  **If antibiotic or given during sick visit, contact patient to discuss current symptoms.   **Confirm prescribing provider    LOV:  05/15/2025  **If longer then 1 year, contact patient to schedule annual PRIOR to refilling. Once scheduled, adjust refill for 30 days, no refills.  **Update CareEverywhere to confirm not being seen elsewhere    NOV:  11/18/2025    Is patient due for annual visit: No  **If future appointment, adjust to annual/follow up.  ** No appointment call to schedule annual/follow up.    Route to PCP, unless PCP no longer here, then physician they are seeing next.      No

## 2025-07-01 ENCOUNTER — APPOINTMENT (OUTPATIENT)
Dept: ELECTROPHYSIOLOGY | Facility: CLINIC | Age: 80
End: 2025-07-01

## 2025-07-01 ENCOUNTER — NON-APPOINTMENT (OUTPATIENT)
Age: 80
End: 2025-07-01

## 2025-07-01 PROCEDURE — 93298 REM INTERROG DEV EVAL SCRMS: CPT

## 2025-08-05 ENCOUNTER — NON-APPOINTMENT (OUTPATIENT)
Age: 80
End: 2025-08-05

## 2025-08-05 ENCOUNTER — APPOINTMENT (OUTPATIENT)
Dept: ELECTROPHYSIOLOGY | Facility: CLINIC | Age: 80
End: 2025-08-05
Payer: MEDICARE

## 2025-08-05 PROCEDURE — 93298 REM INTERROG DEV EVAL SCRMS: CPT

## 2025-09-09 ENCOUNTER — APPOINTMENT (OUTPATIENT)
Dept: ELECTROPHYSIOLOGY | Facility: CLINIC | Age: 80
End: 2025-09-09

## 2025-09-09 PROCEDURE — 93298 REM INTERROG DEV EVAL SCRMS: CPT

## 2025-09-17 ENCOUNTER — NON-APPOINTMENT (OUTPATIENT)
Age: 80
End: 2025-09-17